# Patient Record
Sex: FEMALE | Race: WHITE | Employment: OTHER | URBAN - NONMETROPOLITAN AREA
[De-identification: names, ages, dates, MRNs, and addresses within clinical notes are randomized per-mention and may not be internally consistent; named-entity substitution may affect disease eponyms.]

---

## 2017-01-18 RX ORDER — PAROXETINE HYDROCHLORIDE 40 MG/1
40 TABLET, FILM COATED ORAL EVERY MORNING
Qty: 30 TABLET | Refills: 3 | Status: SHIPPED | OUTPATIENT
Start: 2017-01-18 | End: 2017-06-14 | Stop reason: SDUPTHER

## 2017-01-18 NOTE — TELEPHONE ENCOUNTER
----- Message from Carla Valderrama sent at 1/18/2017 11:22 AM CST -----  CALL IN PAXIL 40 MG TO [facility]

## 2017-01-19 ENCOUNTER — ANESTHESIA EVENT (OUTPATIENT)
Dept: OPERATING ROOM | Age: 67
End: 2017-01-19

## 2017-01-23 ENCOUNTER — HOSPITAL ENCOUNTER (OUTPATIENT)
Age: 67
Setting detail: OUTPATIENT SURGERY
Discharge: HOME | End: 2017-01-23
Attending: INTERNAL MEDICINE | Admitting: INTERNAL MEDICINE

## 2017-01-23 ENCOUNTER — ANESTHESIA (OUTPATIENT)
Dept: OPERATING ROOM | Age: 67
End: 2017-01-23
Payer: MEDICARE

## 2017-01-23 VITALS
DIASTOLIC BLOOD PRESSURE: 80 MMHG | RESPIRATION RATE: 18 BRPM | HEIGHT: 63 IN | BODY MASS INDEX: 46.07 KG/M2 | WEIGHT: 260 LBS | OXYGEN SATURATION: 100 % | TEMPERATURE: 97.4 F | HEART RATE: 48 BPM | SYSTOLIC BLOOD PRESSURE: 168 MMHG

## 2017-01-23 VITALS — DIASTOLIC BLOOD PRESSURE: 66 MMHG | OXYGEN SATURATION: 99 % | SYSTOLIC BLOOD PRESSURE: 135 MMHG

## 2017-01-23 PROCEDURE — 38221 DX BONE MARROW BIOPSIES: CPT

## 2017-01-23 PROCEDURE — G8907 PT DOC NO EVENTS ON DISCHARG: HCPCS

## 2017-01-23 PROCEDURE — 01112 ANES BONE MARROW ASPIR&/BX: CPT | Performed by: NURSE ANESTHETIST, CERTIFIED REGISTERED

## 2017-01-23 PROCEDURE — G8918 PT W/O PREOP ORDER IV AB PRO: HCPCS

## 2017-01-23 RX ORDER — HYDROMORPHONE HCL 110MG/55ML
0.25 PATIENT CONTROLLED ANALGESIA SYRINGE INTRAVENOUS EVERY 5 MIN PRN
Status: DISCONTINUED | OUTPATIENT
Start: 2017-01-23 | End: 2017-01-23 | Stop reason: HOSPADM

## 2017-01-23 RX ORDER — FENTANYL CITRATE 50 UG/ML
50 INJECTION, SOLUTION INTRAMUSCULAR; INTRAVENOUS EVERY 5 MIN PRN
Status: DISCONTINUED | OUTPATIENT
Start: 2017-01-23 | End: 2017-01-23 | Stop reason: HOSPADM

## 2017-01-23 RX ORDER — HYDROMORPHONE HCL 110MG/55ML
0.5 PATIENT CONTROLLED ANALGESIA SYRINGE INTRAVENOUS EVERY 5 MIN PRN
Status: DISCONTINUED | OUTPATIENT
Start: 2017-01-23 | End: 2017-01-23 | Stop reason: HOSPADM

## 2017-01-23 RX ORDER — FENTANYL CITRATE 50 UG/ML
INJECTION, SOLUTION INTRAMUSCULAR; INTRAVENOUS PRN
Status: DISCONTINUED | OUTPATIENT
Start: 2017-01-23 | End: 2017-01-23 | Stop reason: SDUPTHER

## 2017-01-23 RX ORDER — HYDRALAZINE HYDROCHLORIDE 20 MG/ML
5 INJECTION INTRAMUSCULAR; INTRAVENOUS EVERY 10 MIN PRN
Status: DISCONTINUED | OUTPATIENT
Start: 2017-01-23 | End: 2017-01-23 | Stop reason: HOSPADM

## 2017-01-23 RX ORDER — LISINOPRIL 20 MG/1
20 TABLET ORAL DAILY
COMMUNITY

## 2017-01-23 RX ORDER — MIDAZOLAM HYDROCHLORIDE 1 MG/ML
INJECTION INTRAMUSCULAR; INTRAVENOUS PRN
Status: DISCONTINUED | OUTPATIENT
Start: 2017-01-23 | End: 2017-01-23 | Stop reason: SDUPTHER

## 2017-01-23 RX ORDER — OXYCODONE HYDROCHLORIDE AND ACETAMINOPHEN 5; 325 MG/1; MG/1
1 TABLET ORAL PRN
Status: DISCONTINUED | OUTPATIENT
Start: 2017-01-23 | End: 2017-01-23 | Stop reason: HOSPADM

## 2017-01-23 RX ORDER — OXYBUTYNIN CHLORIDE 5 MG/1
5 TABLET ORAL DAILY
COMMUNITY

## 2017-01-23 RX ORDER — LABETALOL HYDROCHLORIDE 5 MG/ML
5 INJECTION, SOLUTION INTRAVENOUS EVERY 10 MIN PRN
Status: DISCONTINUED | OUTPATIENT
Start: 2017-01-23 | End: 2017-01-23 | Stop reason: HOSPADM

## 2017-01-23 RX ORDER — CETIRIZINE HYDROCHLORIDE 10 MG/1
10 TABLET ORAL DAILY
COMMUNITY
End: 2020-02-28

## 2017-01-23 RX ORDER — ONDANSETRON 2 MG/ML
4 INJECTION INTRAMUSCULAR; INTRAVENOUS
Status: DISCONTINUED | OUTPATIENT
Start: 2017-01-23 | End: 2017-01-23 | Stop reason: HOSPADM

## 2017-01-23 RX ORDER — DIPHENHYDRAMINE HYDROCHLORIDE 50 MG/ML
12.5 INJECTION INTRAMUSCULAR; INTRAVENOUS
Status: DISCONTINUED | OUTPATIENT
Start: 2017-01-23 | End: 2017-01-23 | Stop reason: HOSPADM

## 2017-01-23 RX ORDER — PROPOFOL 10 MG/ML
INJECTION, EMULSION INTRAVENOUS PRN
Status: DISCONTINUED | OUTPATIENT
Start: 2017-01-23 | End: 2017-01-23 | Stop reason: SDUPTHER

## 2017-01-23 RX ORDER — PROMETHAZINE HYDROCHLORIDE 25 MG/ML
12.5 INJECTION, SOLUTION INTRAMUSCULAR; INTRAVENOUS
Status: DISCONTINUED | OUTPATIENT
Start: 2017-01-23 | End: 2017-01-23 | Stop reason: HOSPADM

## 2017-01-23 RX ORDER — MEPERIDINE HYDROCHLORIDE 25 MG/ML
12.5 INJECTION INTRAMUSCULAR; INTRAVENOUS; SUBCUTANEOUS EVERY 5 MIN PRN
Status: DISCONTINUED | OUTPATIENT
Start: 2017-01-23 | End: 2017-01-23 | Stop reason: HOSPADM

## 2017-01-23 RX ORDER — M-VIT,TX,IRON,MINS/CALC/FOLIC 27MG-0.4MG
1 TABLET ORAL DAILY
COMMUNITY

## 2017-01-23 RX ORDER — ALLOPURINOL 300 MG/1
300 TABLET ORAL DAILY
COMMUNITY
End: 2020-02-28

## 2017-01-23 RX ORDER — PAROXETINE HYDROCHLORIDE 40 MG/1
40 TABLET, FILM COATED ORAL EVERY MORNING
COMMUNITY
End: 2020-02-28

## 2017-01-23 RX ORDER — SODIUM CHLORIDE, SODIUM LACTATE, POTASSIUM CHLORIDE, CALCIUM CHLORIDE 600; 310; 30; 20 MG/100ML; MG/100ML; MG/100ML; MG/100ML
INJECTION, SOLUTION INTRAVENOUS CONTINUOUS
Status: DISCONTINUED | OUTPATIENT
Start: 2017-01-23 | End: 2017-01-23 | Stop reason: HOSPADM

## 2017-01-23 RX ORDER — OXYCODONE HYDROCHLORIDE AND ACETAMINOPHEN 5; 325 MG/1; MG/1
2 TABLET ORAL PRN
Status: DISCONTINUED | OUTPATIENT
Start: 2017-01-23 | End: 2017-01-23 | Stop reason: HOSPADM

## 2017-01-23 RX ADMIN — SODIUM CHLORIDE, SODIUM LACTATE, POTASSIUM CHLORIDE, CALCIUM CHLORIDE: 600; 310; 30; 20 INJECTION, SOLUTION INTRAVENOUS at 12:40

## 2017-01-23 RX ADMIN — FENTANYL CITRATE 50 MCG: 50 INJECTION, SOLUTION INTRAMUSCULAR; INTRAVENOUS at 13:15

## 2017-01-23 RX ADMIN — MIDAZOLAM HYDROCHLORIDE 1 MG: 1 INJECTION INTRAMUSCULAR; INTRAVENOUS at 13:15

## 2017-01-23 RX ADMIN — PROPOFOL 100 MG: 10 INJECTION, EMULSION INTRAVENOUS at 13:15

## 2017-01-27 ENCOUNTER — OFFICE VISIT (OUTPATIENT)
Dept: INTERNAL MEDICINE | Facility: CLINIC | Age: 67
End: 2017-01-27

## 2017-01-27 VITALS
DIASTOLIC BLOOD PRESSURE: 70 MMHG | HEART RATE: 56 BPM | HEIGHT: 63 IN | BODY MASS INDEX: 46.23 KG/M2 | SYSTOLIC BLOOD PRESSURE: 146 MMHG | WEIGHT: 260.9 LBS

## 2017-01-27 DIAGNOSIS — N39.41 URGE INCONTINENCE: ICD-10-CM

## 2017-01-27 DIAGNOSIS — N32.81 OVERACTIVE BLADDER: ICD-10-CM

## 2017-01-27 DIAGNOSIS — D47.2 MONOCLONAL GAMMOPATHY: ICD-10-CM

## 2017-01-27 DIAGNOSIS — E11.9 TYPE 2 DIABETES MELLITUS WITHOUT COMPLICATION, WITHOUT LONG-TERM CURRENT USE OF INSULIN (HCC): ICD-10-CM

## 2017-01-27 DIAGNOSIS — F33.42 RECURRENT MAJOR DEPRESSIVE DISORDER, IN FULL REMISSION (HCC): ICD-10-CM

## 2017-01-27 DIAGNOSIS — N18.30 CKD (CHRONIC KIDNEY DISEASE) STAGE 3, GFR 30-59 ML/MIN (HCC): ICD-10-CM

## 2017-01-27 DIAGNOSIS — I10 ESSENTIAL HYPERTENSION: Primary | ICD-10-CM

## 2017-01-27 DIAGNOSIS — E66.01 MORBID OBESITY DUE TO EXCESS CALORIES (HCC): ICD-10-CM

## 2017-01-27 PROCEDURE — 99214 OFFICE O/P EST MOD 30 MIN: CPT | Performed by: INTERNAL MEDICINE

## 2017-01-27 RX ORDER — LISINOPRIL 20 MG/1
20 TABLET ORAL DAILY
Qty: 90 TABLET | Refills: 1 | Status: SHIPPED | OUTPATIENT
Start: 2017-01-27 | End: 2017-07-27 | Stop reason: CLARIF

## 2017-01-27 RX ORDER — OXYBUTYNIN CHLORIDE 10 MG/1
10 TABLET, EXTENDED RELEASE ORAL DAILY
Qty: 30 TABLET | Refills: 5 | Status: SHIPPED | OUTPATIENT
Start: 2017-01-27 | End: 2017-07-27 | Stop reason: SDUPTHER

## 2017-01-27 NOTE — PROGRESS NOTES
"CC: Follow-up for overactive bladder, diabetes, hypertension    History:  Sarah Littlejohn is a 66 y.o. female who presents today for follow-up for evaluation of the above:  She reports she has been doing very well and continues her medication for diabetes, hypertension, and depression.  She tried to wean off her Paxil, though this was unsuccessful and she began taking it regularly again.  She does have issues with both stress and urge incontinence.  Oxybutynin helped, though she is having some breakthrough symptoms overnight's.  She requests an increase in her dose.    ROS:  Review of Systems   Constitutional: Negative for chills and fever.   HENT: Negative for congestion and sore throat.    Respiratory: Negative for cough and shortness of breath.    Cardiovascular: Negative for chest pain and palpitations.   Gastrointestinal: Negative for abdominal pain, constipation and nausea.   Genitourinary: Positive for urgency.   Musculoskeletal: Negative for back pain and gait problem.       Ms. Littlejohn  reports that she has never smoked. She does not have any smokeless tobacco history on file. She reports that she does not drink alcohol.      Current Outpatient Prescriptions:   •  allopurinol (ZYLOPRIM) 300 MG tablet, Take 300 mg by mouth Daily., Disp: , Rfl:   •  cetirizine (ZyrTEC) 10 MG tablet, Take 10 mg by mouth Daily., Disp: , Rfl:   •  lisinopril (PRINIVIL,ZESTRIL) 20 MG tablet, Take 1 tablet by mouth Daily., Disp: 30 tablet, Rfl: 3  •  metFORMIN (GLUCOPHAGE) 500 MG tablet, Take 1 tablet by mouth Every Night., Disp: 30 tablet, Rfl: 3  •  oxybutynin XL (DITROPAN-XL) 10 MG 24 hr tablet, Take 1 tablet by mouth Daily., Disp: 30 tablet, Rfl: 5  •  PARoxetine (PAXIL) 40 MG tablet, Take 1 tablet by mouth Every Morning., Disp: 30 tablet, Rfl: 3  •  desonide (DESOWEN) 0.05 % cream, Apply 1 application topically As Needed for irritation., Disp: , Rfl:       OBJECTIVE:  Visit Vitals   • /70   • Pulse 56   • Ht 63\" " (160 cm)   • Wt 260 lb 14.4 oz (118 kg)   • BMI 46.22 kg/m2      Physical Exam   Constitutional: She is oriented to person, place, and time. She appears well-nourished. No distress.   Cardiovascular: Normal rate and regular rhythm.  Exam reveals gallop and S4.    No murmur heard.  Pulmonary/Chest: Effort normal and breath sounds normal. She has no wheezes.   Abdominal: Soft. There is no tenderness.   Neurological: She is alert and oriented to person, place, and time. Gait normal.   Psychiatric: She has a normal mood and affect.       Assessment/Plan    Diagnoses and all orders for this visit:    Essential hypertension  -     Lipid Panel; Future  -     lisinopril (PRINIVIL,ZESTRIL) 20 MG tablet; Take 1 tablet by mouth Daily.  Well controlled, BP goal for age is <140/90 per JNC 8 guidelines and continue current medications    Overactive bladder  Urge incontinence  -     oxybutynin XL (DITROPAN-XL) 10 MG 24 hr tablet; Take 1 tablet by mouth Daily.    CKD (chronic kidney disease) stage 3, GFR 30-59 ml/min  -     Comprehensive Metabolic Panel; Future  Stable on lisinopril.  She follows with Dr. Santiago.    Morbid obesity due to excess calories  -     Lipid Panel; Future  She is down 2 pounds since her appointment 3 months ago.  We encouraged her to continue with dietary modification and regular exercise.    Recurrent major depressive disorder, in full remission  Unsuccessful wean off Paxil.  She will continue this for now and we may try another drug holiday in 6-12 months.    Type 2 diabetes mellitus without complication, without long-term current use of insulin  -     Hemoglobin A1c; Future  -     Hepatitis C Antibody; Future  -     metFORMIN (GLUCOPHAGE) 500 MG tablet; Take 1 tablet by mouth Every Night.  A1c was 6.0% in July 2016.  This represents excellent control and given the option to stop metformin, she opted to continue until a follow-up A1c.  She continues on ACE inhibitor therapy without significant  microalbuminuria.  We will check lipid panel to determine 10 year ASCVD risk to guide the need for pharmacologic therapy, though given age, it may not be needed to start therapy at this time.    Monoclonal gammopathy  Currently under the care of Dr. Zeng and has had workup including bone marrow biopsy.  She goes back in a couple of weeks for results of that, though they seem hopeful to have a diagnosis of MGUS, which will only require routine follow-up.    She received Prevnar vaccination at her previous visit and we will plan for Pneumovax 1 year after that date.      An After Visit Summary was printed and given to the patient at discharge.  Return in about 6 months (around 7/27/2017) for Medicare Wellness. Sooner if problems arise.         Michael Singleton D.O. 1/27/2017

## 2017-01-27 NOTE — MR AVS SNAPSHOT
Sarah Littlejohn   1/27/2017 1:30 PM   Office Visit    Dept Phone:  [phone]   Encounter #:  [id number]    Provider:  Michael Singleton DO   Department:  [facility]                Your Full Care Plan              Today's Medication Changes          These changes are accurate as of: 1/27/17  2:01 PM.  If you have any questions, ask your nurse or doctor.               Medication(s)that have changed:     oxybutynin XL 10 MG 24 hr tablet   Commonly known as:  DITROPAN-XL   Take 1 tablet by mouth Daily.   What changed:    - medication strength  - how much to take   Changed by:  Michael Singleton DO            Where to Get Your Medications      These medications were sent to [id number] - [place], [place] - [address] - [phone]  - [phone]   [address] [place] [address]     Phone:  [phone]     lisinopril 20 MG tablet    metFORMIN 500 MG tablet    oxybutynin XL 10 MG 24 hr tablet                  Your Updated Medication List          This list is accurate as of: 1/27/17  2:01 PM.  Always use your most recent med list.                allopurinol 300 MG tablet   Commonly known as:  ZYLOPRIM       cetirizine 10 MG tablet   Commonly known as:  zyrTEC       desonide 0.05 % cream   Commonly known as:  DESOWEN       lisinopril 20 MG tablet   Commonly known as:  PRINIVIL,ZESTRIL   Take 1 tablet by mouth Daily.       metFORMIN 500 MG tablet   Commonly known as:  GLUCOPHAGE   Take 1 tablet by mouth Every Night.       oxybutynin XL 10 MG 24 hr tablet   Commonly known as:  DITROPAN-XL   Take 1 tablet by mouth Daily.       PARoxetine 40 MG tablet   Commonly known as:  PAXIL   Take 1 tablet by mouth Every Morning.               You Were Diagnosed With        Codes Comments    Essential hypertension    -  Primary ICD-10-CM: I10  ICD-9-CM: 401.9     Overactive bladder     ICD-10-CM: N32.81  ICD-9-CM: 596.51     Urge  incontinence     ICD-10-CM: N39.41  ICD-9-CM: 788.31     CKD (chronic kidney disease) stage 3, GFR 30-59 ml/min     ICD-10-CM: N18.3  ICD-9-CM: 585.3     Morbid obesity due to excess calories     ICD-10-CM: E66.01  ICD-9-CM: 278.01     Recurrent major depressive disorder, in full remission     ICD-10-CM: F33.42  ICD-9-CM: 296.36     Type 2 diabetes mellitus without complication, without long-term current use of insulin     ICD-10-CM: E11.9  ICD-9-CM: 250.00       Instructions     None    Patient Instructions History      Upcoming Appointments     Visit Type Date Time Department    FOLLOW UP 2017  1:30 PM [facility] [place]    INIT MEDICARE WELLNESS VISIT 2017 11:00 AM [facility] [place]      Back& Signup     [facility] Back& allows you to send messages to your doctor, view your test results, renew your prescriptions, schedule appointments, and more. To sign up, go to POS on CLOUD and click on the Sign Up Now link in the New User? box. Enter your Back& Activation Code exactly as it appears below along with the last four digits of your Social Security Number and your Date of Birth () to complete the sign-up process. If you do not sign up before the expiration date, you must request a new code.    Back& Activation Code: [email or web address]  Expires: 2/10/2017  2:01 PM    If you have questions, you can email [email or web address] or call [phone] to talk to our Back& staff. Remember, Back& is NOT to be used for urgent needs. For medical emergencies, dial 911.               Other Info from Your Visit           Your Appointments     2017 11:00 AM CDT   Initial Medicare Wellness Visit with Michael Singleton DO   [facility] (--)    [address] [place] [address]  [facility] [address] [phone]              Allergies     Neosporin [Neomycin-bacitracin Zn-polymyx]      Penicillins        Vital Signs     Blood Pressure Pulse Height Weight  "Body Mass Index Smoking Status    146/70 56 63\" (160 cm) 260 lb 14.4 oz (118 kg) 46.22 kg/m2 Never Smoker      Problems and Diagnoses Noted     CKD (chronic kidney disease) stage 3, GFR 30-59 ml/min    Depression    High blood pressure    Severe obesity    Overactive bladder        Urge incontinence of urine        Type 2 diabetes mellitus without complication, without long-term current use of insulin            "

## 2017-02-09 ENCOUNTER — HOSPITAL ENCOUNTER (OUTPATIENT)
Dept: CT IMAGING | Age: 67
Discharge: HOME | End: 2017-02-09
Payer: MEDICARE

## 2017-02-09 DIAGNOSIS — D47.2 MONOCLONAL GAMMOPATHY: ICD-10-CM

## 2017-02-09 PROCEDURE — 74176 CT ABD & PELVIS W/O CONTRAST: CPT

## 2017-02-09 PROCEDURE — 70490 CT SOFT TISSUE NECK W/O DYE: CPT

## 2017-02-09 PROCEDURE — 71250 CT THORAX DX C-: CPT

## 2017-02-16 ENCOUNTER — HOSPITAL ENCOUNTER (OUTPATIENT)
Dept: ULTRASOUND IMAGING | Age: 67
Discharge: HOME | End: 2017-02-16
Payer: MEDICARE

## 2017-02-16 DIAGNOSIS — N28.1 KIDNEY CYST, ACQUIRED: ICD-10-CM

## 2017-02-16 PROCEDURE — 76770 US EXAM ABDO BACK WALL COMP: CPT

## 2017-05-08 ENCOUNTER — TRANSCRIBE ORDERS (OUTPATIENT)
Dept: GENERAL RADIOLOGY | Facility: HOSPITAL | Age: 67
End: 2017-05-08

## 2017-05-08 ENCOUNTER — LAB (OUTPATIENT)
Dept: LAB | Facility: HOSPITAL | Age: 67
End: 2017-05-08
Attending: INTERNAL MEDICINE

## 2017-05-08 DIAGNOSIS — N18.30 CHRONIC KIDNEY DISEASE, STAGE III (MODERATE) (HCC): ICD-10-CM

## 2017-05-08 DIAGNOSIS — N18.30 CHRONIC KIDNEY DISEASE, STAGE III (MODERATE) (HCC): Primary | ICD-10-CM

## 2017-05-08 LAB
ALBUMIN SERPL-MCNC: 4.1 G/DL (ref 3.5–5)
ANION GAP SERPL CALCULATED.3IONS-SCNC: 11 MMOL/L (ref 4–13)
AUTO MIXED CELLS #: 0.7 10*3/UL (ref 0.1–2.6)
AUTO MIXED CELLS %: 12.3 % (ref 0.1–24)
BACTERIA UR QL AUTO: ABNORMAL /HPF
BILIRUB UR QL STRIP: NEGATIVE
BUN BLD-MCNC: 23 MG/DL (ref 5–21)
BUN/CREAT SERPL: 17.6
CALCIUM SPEC-SCNC: 9.2 MG/DL (ref 8.4–10.4)
CHLORIDE SERPL-SCNC: 102 MMOL/L (ref 98–110)
CLARITY UR: CLEAR
CO2 SERPL-SCNC: 29 MMOL/L (ref 24–31)
COLOR UR: YELLOW
CREAT BLD-MCNC: 1.31 MG/DL (ref 0.5–1.4)
CREAT UR-MCNC: 84.4 MG/DL
ERYTHROCYTE [DISTWIDTH] IN BLOOD BY AUTOMATED COUNT: 11.9 % (ref 12–15)
GFR SERPL CREATININE-BSD FRML MDRD: 41 ML/MIN/1.73
GLUCOSE BLD-MCNC: 108 MG/DL (ref 70–100)
GLUCOSE UR STRIP-MCNC: NEGATIVE MG/DL
HCT VFR BLD AUTO: 39.3 % (ref 37–47)
HGB BLD-MCNC: 13.5 G/DL (ref 12–16)
HGB UR QL STRIP.AUTO: NEGATIVE
HYALINE CASTS UR QL AUTO: ABNORMAL /LPF
KETONES UR QL STRIP: NEGATIVE
LEUKOCYTE ESTERASE UR QL STRIP.AUTO: ABNORMAL
LYMPHOCYTES # BLD AUTO: 1.5 10*3/MM3 (ref 0.8–7)
LYMPHOCYTES NFR BLD AUTO: 25.5 % (ref 15–45)
MCH RBC QN AUTO: 31.7 PG (ref 28–32)
MCHC RBC AUTO-ENTMCNC: 34.4 G/DL (ref 33–36)
MCV RBC AUTO: 92.3 FL (ref 82–98)
NEUTROPHILS # BLD AUTO: 3.7 10*3/MM3 (ref 1.5–8.3)
NEUTROPHILS NFR BLD AUTO: 62.2 % (ref 39–78)
NITRITE UR QL STRIP: NEGATIVE
PH UR STRIP.AUTO: 6.5 [PH] (ref 5–8)
PHOSPHATE SERPL-MCNC: 3.9 MG/DL (ref 2.5–4.5)
PLATELET # BLD AUTO: 213 10*3/MM3 (ref 130–400)
PMV BLD AUTO: 9.1 FL (ref 6–12)
POTASSIUM BLD-SCNC: 4.8 MMOL/L (ref 3.5–5.3)
PROT UR QL STRIP: NEGATIVE
PROT UR-MCNC: 7 MG/DL (ref 0–13.5)
PROT/CREAT UR: 82.9 MG/G CREA
PTH-INTACT SERPL-MCNC: 34.9 PG/ML (ref 7.5–53.5)
RBC # BLD AUTO: 4.26 10*6/MM3 (ref 4.2–5.4)
RBC # UR: ABNORMAL /HPF
REF LAB TEST METHOD: ABNORMAL
SODIUM BLD-SCNC: 142 MMOL/L (ref 135–145)
SP GR UR STRIP: 1.01 (ref 1–1.03)
SQUAMOUS #/AREA URNS HPF: ABNORMAL /HPF
URATE SERPL-MCNC: 7.8 MG/DL (ref 2.7–7.5)
UROBILINOGEN UR QL STRIP: ABNORMAL
WBC NRBC COR # BLD: 5.9 10*3/MM3 (ref 4.8–10.8)
WBC UR QL AUTO: ABNORMAL /HPF

## 2017-05-08 PROCEDURE — 82570 ASSAY OF URINE CREATININE: CPT | Performed by: INTERNAL MEDICINE

## 2017-05-08 PROCEDURE — 84550 ASSAY OF BLOOD/URIC ACID: CPT

## 2017-05-08 PROCEDURE — 81001 URINALYSIS AUTO W/SCOPE: CPT

## 2017-05-08 PROCEDURE — 83970 ASSAY OF PARATHORMONE: CPT | Performed by: INTERNAL MEDICINE

## 2017-05-08 PROCEDURE — 84156 ASSAY OF PROTEIN URINE: CPT | Performed by: INTERNAL MEDICINE

## 2017-05-08 PROCEDURE — 85025 COMPLETE CBC W/AUTO DIFF WBC: CPT | Performed by: INTERNAL MEDICINE

## 2017-05-08 PROCEDURE — 80069 RENAL FUNCTION PANEL: CPT

## 2017-05-08 PROCEDURE — 36415 COLL VENOUS BLD VENIPUNCTURE: CPT

## 2017-05-08 PROCEDURE — 87086 URINE CULTURE/COLONY COUNT: CPT | Performed by: INTERNAL MEDICINE

## 2017-05-10 LAB — BACTERIA SPEC AEROBE CULT: ABNORMAL

## 2017-05-25 ENCOUNTER — HOSPITAL ENCOUNTER (OUTPATIENT)
Dept: CT IMAGING | Age: 67
Discharge: HOME | End: 2017-05-25
Payer: MEDICARE

## 2017-05-25 DIAGNOSIS — R91.1 PULMONARY NODULE: ICD-10-CM

## 2017-05-25 PROCEDURE — 71250 CT THORAX DX C-: CPT

## 2017-06-14 RX ORDER — PAROXETINE HYDROCHLORIDE 40 MG/1
40 TABLET, FILM COATED ORAL EVERY MORNING
Qty: 30 TABLET | Refills: 5 | Status: SHIPPED | OUTPATIENT
Start: 2017-06-14 | End: 2018-01-02 | Stop reason: SDUPTHER

## 2017-07-27 ENCOUNTER — OFFICE VISIT (OUTPATIENT)
Dept: INTERNAL MEDICINE | Facility: CLINIC | Age: 67
End: 2017-07-27

## 2017-07-27 VITALS
HEART RATE: 64 BPM | WEIGHT: 253.7 LBS | BODY MASS INDEX: 44.95 KG/M2 | DIASTOLIC BLOOD PRESSURE: 58 MMHG | SYSTOLIC BLOOD PRESSURE: 110 MMHG | OXYGEN SATURATION: 98 % | RESPIRATION RATE: 16 BRPM | HEIGHT: 63 IN

## 2017-07-27 DIAGNOSIS — N18.30 CKD (CHRONIC KIDNEY DISEASE) STAGE 3, GFR 30-59 ML/MIN (HCC): ICD-10-CM

## 2017-07-27 DIAGNOSIS — R73.02 IMPAIRED GLUCOSE TOLERANCE: ICD-10-CM

## 2017-07-27 DIAGNOSIS — I10 ESSENTIAL HYPERTENSION: Primary | ICD-10-CM

## 2017-07-27 DIAGNOSIS — N32.81 OVERACTIVE BLADDER: ICD-10-CM

## 2017-07-27 DIAGNOSIS — E66.01 MORBID OBESITY DUE TO EXCESS CALORIES (HCC): ICD-10-CM

## 2017-07-27 DIAGNOSIS — F33.41 RECURRENT MAJOR DEPRESSIVE DISORDER, IN PARTIAL REMISSION (HCC): ICD-10-CM

## 2017-07-27 DIAGNOSIS — N39.41 URGE INCONTINENCE: ICD-10-CM

## 2017-07-27 PROCEDURE — 99214 OFFICE O/P EST MOD 30 MIN: CPT | Performed by: INTERNAL MEDICINE

## 2017-07-27 PROCEDURE — G0438 PPPS, INITIAL VISIT: HCPCS | Performed by: INTERNAL MEDICINE

## 2017-07-27 RX ORDER — OXYBUTYNIN CHLORIDE 10 MG/1
10 TABLET, EXTENDED RELEASE ORAL DAILY
Qty: 30 TABLET | Refills: 5 | Status: SHIPPED | OUTPATIENT
Start: 2017-07-27 | End: 2018-02-22 | Stop reason: SDUPTHER

## 2017-07-27 RX ORDER — LISINOPRIL AND HYDROCHLOROTHIAZIDE 20; 12.5 MG/1; MG/1
1 TABLET ORAL DAILY
COMMUNITY
Start: 2017-05-12 | End: 2020-11-02

## 2017-07-27 NOTE — PROGRESS NOTES
"CC: Follow-up impaired glucose tolerance    History:  Sarah Littlejohn is a 67 y.o. female who presents today for follow-up for evaluation of the above:  She reports she has been doing well.  She continues on metformin and tolerates this well.  She also continues on Zestoretic related to blood pressure and denies any symptoms of headache or vision disturbance.  She notes no changes in her urinary habits.  Her depression is well controlled with Paxil and she does continue on oxybutynin related to urge incontinence and overactive bladder.    ROS:  Review of Systems   Constitutional: Negative for chills and fever.   Respiratory: Negative for cough and shortness of breath.    Cardiovascular: Negative for chest pain and palpitations.   Gastrointestinal: Negative for abdominal pain, constipation and nausea.   Genitourinary: Positive for urgency.       Ms. Littlejohn  reports that she has never smoked. She has never used smokeless tobacco. She reports that she does not drink alcohol or use illicit drugs.      Current Outpatient Prescriptions:   •  desonide (DESOWEN) 0.05 % cream, Apply 1 application topically As Needed for irritation., Disp: , Rfl:   •  lisinopril-hydrochlorothiazide (PRINZIDE,ZESTORETIC) 20-12.5 MG per tablet, Take 1 tablet by mouth 2 (Two) Times a Day., Disp: , Rfl:   •  metFORMIN (GLUCOPHAGE) 500 MG tablet, Take 1 tablet by mouth Every Night., Disp: 90 tablet, Rfl: 1  •  oxybutynin XL (DITROPAN-XL) 10 MG 24 hr tablet, Take 1 tablet by mouth Daily., Disp: 30 tablet, Rfl: 5  •  PARoxetine (PAXIL) 40 MG tablet, Take 1 tablet by mouth Every Morning., Disp: 30 tablet, Rfl: 5  •  cetirizine (ZyrTEC) 10 MG tablet, Take 10 mg by mouth Daily., Disp: , Rfl:       OBJECTIVE:  /58 (BP Location: Left arm, Patient Position: Sitting, Cuff Size: Adult)  Pulse 64  Resp 16  Ht 63\" (160 cm)  Wt 253 lb 11.2 oz (115 kg)  SpO2 98%  BMI 44.94 kg/m2   Physical Exam   Constitutional: She is oriented to person, " place, and time. She appears well-nourished. No distress.   Cardiovascular: Normal rate, regular rhythm and normal heart sounds.    No murmur heard.  Pulmonary/Chest: Effort normal and breath sounds normal. She has no wheezes.   Abdominal: Soft. There is no tenderness.   Neurological: She is alert and oriented to person, place, and time.   Psychiatric: She has a normal mood and affect.       Assessment/Plan    Diagnoses and all orders for this visit:    Essential hypertension  Well controlled, BP goal for age is <140/90 per JNC 8 guidelines and continue current medications    Overactive bladder  Urge incontinence  -     oxybutynin XL (DITROPAN-XL) 10 MG 24 hr tablet; Take 1 tablet by mouth Daily.    CKD (chronic kidney disease) stage 3, GFR 30-59 ml/min  Stable on lisinopril.  She follows with Dr. Santiago.    Impaired glucose tolerance  Last A1c in July 2016 was 6.0%.  Glucose was 108 on recent renal function panel for Dr. Santiago.  This likely indicates good control and we will continue on metformin at this time.    Morbid obesity due to excess calories  Recommended attention to portion control and being careful about the types and timing of meals for the purpose of weight management.    Recurrent major depressive disorder, in partial remission  Stable on Paxil without any uncontrolled symptoms.      An After Visit Summary was printed and given to the patient at discharge.  Return in about 6 months (around 1/27/2018) for Recheck. Sooner if problems arise.         Michael Singleton D.O. 7/27/2017

## 2017-07-27 NOTE — PROGRESS NOTES
QUICK REFERENCE INFORMATION:  The ABCs of the Annual Wellness Visit    Initial Medicare Wellness Visit    HEALTH RISK ASSESSMENT    1950    Recent Hospitalizations:  No hospitalization(s) within the last year..        Current Medical Providers:  Patient Care Team:  Michael Singleton DO as PCP - General (Internal Medicine)  ANY Swift as PCP - Claims Attributed  Dewayne Santiago MD as Consulting Physician (Nephrology)  Jose Zeng MD as Consulting Physician (Oncology)  Marek Wayne MD as Consulting Physician (Dermatology)  Hannah Marvin MD as Consulting Physician (Ophthalmology)        Smoking Status:  History   Smoking Status   • Never Smoker   Smokeless Tobacco   • Never Used       Alcohol Consumption:  History   Alcohol Use No       Depression Screen:   PHQ-9 Depression Screening 7/27/2017   Little interest or pleasure in doing things 0   Feeling down, depressed, or hopeless 0   PHQ-9 Total Score 0       Health Habits and Functional and Cognitive Screening:  Functional & Cognitive Status 7/27/2017   Do you have difficulty preparing food and eating? No   Do you have difficulty bathing yourself? No   Do you have difficulty getting dressed? No   Do you have difficulty using the toilet? No   Do you have difficulty moving around from place to place? No   In the past year have you fallen or experienced a near fall? No   Do you need help using the phone?  No   Are you deaf or do you have serious difficulty hearing?  No   Do you need help with transportation? No   Do you need help shopping? No   Do you need help preparing meals?  No   Do you need help with housework?  No   Do you need help with laundry? No   Do you need help taking your medications? No   Do you need help managing money? No   Do you have difficulty concentrating, remembering or making decisions? No       Health Habits  Current Diet: Well Balanced Diet  Dental Exam: Not up to date  Eye Exam: Up to  date  Exercise (times per week): 2 times per week  Current Exercise Activities Include: Walking          Does the patient have evidence of cognitive impairment? No    Asiprin use counseling: Does not need ASA (and currently is not on it)      Recent Lab Results:    Visual Acuity:  No exam data present    Age-appropriate Screening Schedule:  Refer to the list below for future screening recommendations based on patient's age, sex and/or medical conditions. Orders for these recommended tests are listed in the plan section. The patient has been provided with a written plan.    Health Maintenance   Topic Date Due   • TDAP/TD VACCINES (1 - Tdap) 02/10/1970   • ZOSTER VACCINE  10/25/2016   • DIABETIC FOOT EXAM  01/27/2017   • HEMOGLOBIN A1C  01/29/2017   • INFLUENZA VACCINE  08/01/2017   • PNEUMOCOCCAL VACCINES (65+ LOW/MEDIUM RISK) (2 of 2 - PPSV23) 10/28/2017   • DIABETIC EYE EXAM  03/06/2018   • URINE MICROALBUMIN  05/08/2018   • MAMMOGRAM  02/06/2019   • COLONOSCOPY  06/08/2026        Subjective   History of Present Illness    Sarah Littlejohn is a 67 y.o. female who presents for an Annual Wellness Visit.    The following portions of the patient's history were reviewed and updated as appropriate: allergies, current medications, past family history, past medical history, past social history, past surgical history and problem list.    Outpatient Medications Prior to Visit   Medication Sig Dispense Refill   • desonide (DESOWEN) 0.05 % cream Apply 1 application topically As Needed for irritation.     • metFORMIN (GLUCOPHAGE) 500 MG tablet Take 1 tablet by mouth Every Night. 90 tablet 1   • PARoxetine (PAXIL) 40 MG tablet Take 1 tablet by mouth Every Morning. 30 tablet 5   • allopurinol (ZYLOPRIM) 300 MG tablet Take 300 mg by mouth Daily.     • oxybutynin XL (DITROPAN-XL) 10 MG 24 hr tablet Take 1 tablet by mouth Daily. 30 tablet 5   • cetirizine (ZyrTEC) 10 MG tablet Take 10 mg by mouth Daily.     • lisinopril  "(PRINIVIL,ZESTRIL) 20 MG tablet Take 1 tablet by mouth Daily. 90 tablet 1     No facility-administered medications prior to visit.        Patient Active Problem List   Diagnosis   • Essential hypertension   • Morbid obesity due to excess calories   • CKD (chronic kidney disease) stage 3, GFR 30-59 ml/min   • Depression   • MGUS (monoclonal gammopathy of unknown significance)   • Impaired glucose tolerance   • Urge incontinence       Advance Care Planning:  has an advance directive - a copy HAS NOT been provided. Have asked the patient to send this to us to add to record.    Identification of Risk Factors:  Risk factors include: weight , unhealthy diet, cardiovascular risk and inactivity.    Review of Systems See  note    Compared to one year ago, the patient feels her physical health is the same.  Compared to one year ago, the patient feels her mental health is the same.    Objective     Physical Exam See  note    Vitals:    07/27/17 1107   BP: 110/58   BP Location: Left arm   Patient Position: Sitting   Cuff Size: Adult   Pulse: 64   Resp: 16   SpO2: 98%   Weight: 253 lb 11.2 oz (115 kg)   Height: 63\" (160 cm)       Body mass index is 44.94 kg/(m^2).  Discussed the patient's BMI with her. The BMI is above average; BMI management plan is completed.    Assessment/Plan   Patient Self-Management and Personalized Health Advice  The patient has been provided with information about: diet, exercise, weight management and designing advance directives and preventive services including:   · Advance directive, Exercise counseling provided, Fall Risk assessment done, Influenza vaccine, Nutrition counseling provided, TdaP vaccine, Zostavax vaccine (Herpes Zoster).    Visit Diagnoses:    ICD-10-CM ICD-9-CM   1. Essential hypertension I10 401.9   2. Overactive bladder N32.81 596.51   3. Urge incontinence N39.41 788.31   4. CKD (chronic kidney disease) stage 3, GFR 30-59 ml/min N18.3 585.3   5. Impaired glucose " tolerance R73.02 790.22   6. Morbid obesity due to excess calories E66.01 278.01       No orders of the defined types were placed in this encounter.      Outpatient Encounter Prescriptions as of 7/27/2017   Medication Sig Dispense Refill   • desonide (DESOWEN) 0.05 % cream Apply 1 application topically As Needed for irritation.     • lisinopril-hydrochlorothiazide (PRINZIDE,ZESTORETIC) 20-12.5 MG per tablet Take 1 tablet by mouth 2 (Two) Times a Day.     • metFORMIN (GLUCOPHAGE) 500 MG tablet Take 1 tablet by mouth Every Night. 90 tablet 1   • oxybutynin XL (DITROPAN-XL) 10 MG 24 hr tablet Take 1 tablet by mouth Daily. 30 tablet 5   • PARoxetine (PAXIL) 40 MG tablet Take 1 tablet by mouth Every Morning. 30 tablet 5   • [DISCONTINUED] allopurinol (ZYLOPRIM) 300 MG tablet Take 300 mg by mouth Daily.     • [DISCONTINUED] oxybutynin XL (DITROPAN-XL) 10 MG 24 hr tablet Take 1 tablet by mouth Daily. 30 tablet 5   • cetirizine (ZyrTEC) 10 MG tablet Take 10 mg by mouth Daily.     • [DISCONTINUED] lisinopril (PRINIVIL,ZESTRIL) 20 MG tablet Take 1 tablet by mouth Daily. 90 tablet 1     No facility-administered encounter medications on file as of 7/27/2017.        Reviewed use of high risk medication in the elderly: yes  Reviewed for potential of harmful drug interactions in the elderly: yes    Follow Up:  Return in about 6 months (around 1/27/2018) for Recheck.     An After Visit Summary and PPPS with all of these plans were given to the patient.

## 2017-07-27 NOTE — PATIENT INSTRUCTIONS
Medicare Wellness  Personal Prevention Plan of Service     Date of Office Visit:  2017  Encounter Provider:  Michael Singleton DO  Place of Service:  [facility]  Patient Name: Sarah Littlejohn  :  1950    As part of the Medicare Wellness portion of your visit today, we are providing you with this personalized preventive plan of services (PPPS). This plan is based upon recommendations of the [place] Preventive Services Task Force (USPSTF) and the Advisory Committee on Immunization Practices (ACIP).    This lists the preventive care services that should be considered, and provides dates of when you are due. Items listed as completed are up-to-date and do not require any further intervention.    Health Maintenance   Topic Date Due   • TDAP/TD VACCINES (1 - Tdap) 02/10/1970   • HEPATITIS C SCREENING  10/25/2016   • MEDICARE ANNUAL WELLNESS  10/25/2016   • ZOSTER VACCINE  10/25/2016   • DIABETIC FOOT EXAM  2017   • HEMOGLOBIN A1C  2017   • INFLUENZA VACCINE  2017   • PNEUMOCOCCAL VACCINES (65+ LOW/MEDIUM RISK) (2 of 2 - PPSV23) 10/28/2017   • DIABETIC EYE EXAM  2018   • URINE MICROALBUMIN  2018   • MAMMOGRAM  2019   • COLONOSCOPY  2026       No orders of the defined types were placed in this encounter.      Return in about 6 months (around 2018) for Recheck.

## 2017-07-28 PROBLEM — E79.0 ASYMPTOMATIC HYPERURICEMIA: Status: ACTIVE | Noted: 2017-07-28

## 2017-09-05 DIAGNOSIS — E11.9 TYPE 2 DIABETES MELLITUS WITHOUT COMPLICATION, WITHOUT LONG-TERM CURRENT USE OF INSULIN (HCC): ICD-10-CM

## 2017-09-06 DIAGNOSIS — E11.9 TYPE 2 DIABETES MELLITUS WITHOUT COMPLICATION, WITHOUT LONG-TERM CURRENT USE OF INSULIN (HCC): ICD-10-CM

## 2017-10-26 ENCOUNTER — TRANSCRIBE ORDERS (OUTPATIENT)
Dept: GENERAL RADIOLOGY | Facility: HOSPITAL | Age: 67
End: 2017-10-26

## 2017-10-26 ENCOUNTER — HOSPITAL ENCOUNTER (OUTPATIENT)
Dept: GENERAL RADIOLOGY | Facility: HOSPITAL | Age: 67
Discharge: HOME | End: 2017-10-26
Admitting: NURSE PRACTITIONER

## 2017-10-26 DIAGNOSIS — S69.92XA INJURY OF LEFT LITTLE FINGER, INITIAL ENCOUNTER: Primary | ICD-10-CM

## 2017-10-26 PROCEDURE — 73140 X-RAY EXAM OF FINGER(S): CPT

## 2017-11-20 ENCOUNTER — TRANSCRIBE ORDERS (OUTPATIENT)
Dept: ADMINISTRATIVE | Facility: HOSPITAL | Age: 67
End: 2017-11-20

## 2017-11-20 DIAGNOSIS — Z12.31 ENCOUNTER FOR SCREENING MAMMOGRAM FOR MALIGNANT NEOPLASM OF BREAST: Primary | ICD-10-CM

## 2017-11-27 ENCOUNTER — HOSPITAL ENCOUNTER (OUTPATIENT)
Dept: MAMMOGRAPHY | Facility: HOSPITAL | Age: 67
Discharge: HOME | End: 2017-11-27
Attending: INTERNAL MEDICINE | Admitting: INTERNAL MEDICINE

## 2017-11-27 DIAGNOSIS — Z12.31 ENCOUNTER FOR SCREENING MAMMOGRAM FOR MALIGNANT NEOPLASM OF BREAST: ICD-10-CM

## 2017-11-27 PROCEDURE — G0202 SCR MAMMO BI INCL CAD: HCPCS

## 2017-11-27 PROCEDURE — 77063 BREAST TOMOSYNTHESIS BI: CPT

## 2017-12-04 ENCOUNTER — HOSPITAL ENCOUNTER (OUTPATIENT)
Dept: CT IMAGING | Age: 67
Discharge: HOME | End: 2017-12-04
Payer: MEDICARE

## 2017-12-04 DIAGNOSIS — R91.1 SOLITARY PULMONARY NODULE: ICD-10-CM

## 2017-12-04 PROCEDURE — 71250 CT THORAX DX C-: CPT

## 2017-12-30 RX ORDER — PAROXETINE HYDROCHLORIDE 40 MG/1
TABLET, FILM COATED ORAL
Qty: 30 TABLET | Refills: 5 | Status: CANCELLED | OUTPATIENT
Start: 2017-12-30

## 2018-01-02 RX ORDER — PAROXETINE HYDROCHLORIDE 40 MG/1
40 TABLET, FILM COATED ORAL EVERY MORNING
Qty: 30 TABLET | Refills: 5 | Status: SHIPPED | OUTPATIENT
Start: 2018-01-02 | End: 2018-07-18 | Stop reason: SDUPTHER

## 2018-01-29 ENCOUNTER — OFFICE VISIT (OUTPATIENT)
Dept: INTERNAL MEDICINE | Facility: CLINIC | Age: 68
End: 2018-01-29

## 2018-01-29 VITALS
DIASTOLIC BLOOD PRESSURE: 62 MMHG | RESPIRATION RATE: 16 BRPM | WEIGHT: 246.5 LBS | HEART RATE: 65 BPM | BODY MASS INDEX: 43.68 KG/M2 | SYSTOLIC BLOOD PRESSURE: 155 MMHG | HEIGHT: 63 IN | OXYGEN SATURATION: 98 %

## 2018-01-29 DIAGNOSIS — Z23 ENCOUNTER FOR IMMUNIZATION: ICD-10-CM

## 2018-01-29 DIAGNOSIS — E66.01 MORBID OBESITY DUE TO EXCESS CALORIES (HCC): ICD-10-CM

## 2018-01-29 DIAGNOSIS — I10 ESSENTIAL HYPERTENSION: Primary | ICD-10-CM

## 2018-01-29 DIAGNOSIS — F33.41 RECURRENT MAJOR DEPRESSIVE DISORDER, IN PARTIAL REMISSION (HCC): ICD-10-CM

## 2018-01-29 DIAGNOSIS — R73.02 IMPAIRED GLUCOSE TOLERANCE: ICD-10-CM

## 2018-01-29 DIAGNOSIS — N18.30 CKD (CHRONIC KIDNEY DISEASE) STAGE 3, GFR 30-59 ML/MIN (HCC): ICD-10-CM

## 2018-01-29 PROCEDURE — 96160 PT-FOCUSED HLTH RISK ASSMT: CPT | Performed by: INTERNAL MEDICINE

## 2018-01-29 PROCEDURE — 90732 PPSV23 VACC 2 YRS+ SUBQ/IM: CPT | Performed by: INTERNAL MEDICINE

## 2018-01-29 PROCEDURE — G0009 ADMIN PNEUMOCOCCAL VACCINE: HCPCS | Performed by: INTERNAL MEDICINE

## 2018-01-29 PROCEDURE — 90662 IIV NO PRSV INCREASED AG IM: CPT | Performed by: INTERNAL MEDICINE

## 2018-01-29 PROCEDURE — G0008 ADMIN INFLUENZA VIRUS VAC: HCPCS | Performed by: INTERNAL MEDICINE

## 2018-01-29 PROCEDURE — 99214 OFFICE O/P EST MOD 30 MIN: CPT | Performed by: INTERNAL MEDICINE

## 2018-01-29 RX ORDER — ALLOPURINOL 300 MG/1
0.5 TABLET ORAL DAILY
COMMUNITY
Start: 2017-11-17 | End: 2019-07-12

## 2018-01-29 RX ORDER — AMLODIPINE BESYLATE 5 MG/1
5 TABLET ORAL DAILY
Qty: 30 TABLET | Refills: 5 | Status: SHIPPED | OUTPATIENT
Start: 2018-01-29 | End: 2018-07-23

## 2018-01-29 NOTE — PROGRESS NOTES
CC: High blood pressure    History:  Sarah Littlejohn is a 67 y.o. female who presents today for follow-up for evaluation of the above:  She notes she has been having issues over the past few weeks with hypertension.  She notes frequently seeing systolic values in the mid 150s.  She notes her diastolic values have been okay.  She has headaches that are most prominent with elevations in her blood pressure.  She notes her mood has been well-controlled on her Paxil.  She has been trying to lose weight and has successfully lost 14 pounds in the last year, including 7 in the last 6 months.      ROS:  Review of Systems   Constitutional: Negative for chills and fever.   HENT: Negative for congestion and sore throat.    Respiratory: Negative for cough and shortness of breath.    Cardiovascular: Negative for chest pain and palpitations.   Gastrointestinal: Negative for abdominal pain, constipation and nausea.   Musculoskeletal: Negative for back pain and gait problem.   Neurological: Positive for headaches.       Ms. Littlejohn  reports that she has never smoked. She has never used smokeless tobacco. She reports that she does not drink alcohol or use illicit drugs.      Current Outpatient Prescriptions:   •  cetirizine (ZyrTEC) 10 MG tablet, Take 10 mg by mouth Daily., Disp: , Rfl:   •  lisinopril-hydrochlorothiazide (PRINZIDE,ZESTORETIC) 20-12.5 MG per tablet, Take 1 tablet by mouth 2 (Two) Times a Day., Disp: , Rfl:   •  metFORMIN (GLUCOPHAGE) 500 MG tablet, Take 1 tablet by mouth Every Night., Disp: 90 tablet, Rfl: 3  •  oxybutynin XL (DITROPAN-XL) 10 MG 24 hr tablet, Take 1 tablet by mouth Daily., Disp: 30 tablet, Rfl: 5  •  PARoxetine (PAXIL) 40 MG tablet, Take 1 tablet by mouth Every Morning., Disp: 30 tablet, Rfl: 5  •  allopurinol (ZYLOPRIM) 300 MG tablet, Take 1 tablet by mouth Daily., Disp: , Rfl:       OBJECTIVE:  /62 (BP Location: Left arm, Patient Position: Sitting, Cuff Size: Adult)  Pulse 65  Resp 16  " Ht 160 cm (62.99\")  Wt 112 kg (246 lb 8 oz)  SpO2 98%  BMI 43.68 kg/m2   Physical Exam   Constitutional: She is oriented to person, place, and time. She appears well-nourished. No distress.   Cardiovascular: Normal rate, regular rhythm and normal heart sounds.    No murmur heard.  Pulmonary/Chest: Effort normal and breath sounds normal. She has no wheezes.   Neurological: She is alert and oriented to person, place, and time.   Psychiatric: She has a normal mood and affect.       Assessment/Plan    Diagnoses and all orders for this visit:    Essential hypertension  -     amLODIPine (NORVASC) 5 MG tablet; Take 1 tablet by mouth Daily.  Poorly controlled, BP goal for age is <140/90 per JNC 8 guidelines and Add amlodipine 5 mg.  She will take this in the evening and take her Zestoretic at 2 tablets every morning.    CKD (chronic kidney disease) stage 3, GFR 30-59 ml/min  Stable without urinary changes on lisinopril.  She follows with Dr. Santiago.    Morbid obesity due to excess calories  Recommended attention to portion control and being careful about the types and timing of meals for the purpose of weight management.  She is congratulated on steady and maintained weight loss over the last year.    Recurrent major depressive disorder, in partial remission  Stable and well-controlled without exacerbation on Paxil.    Impaired glucose tolerance  She will have labs collected prior to her next appointment with Dr. Santiaog.  These will include labs previously ordered through our office.    Encounter for immunization  -     Flu Vaccine High Dose PF 65YR+ ([id number])  -     Pneumococcal Polysaccharide Vaccine 23-Valent Greater Than or Equal To 3yo Subcutaneous / IM    An After Visit Summary was printed and given to the patient at discharge.  Return in about 6 months (around 7/29/2018) for Recheck. Sooner if problems arise.         Michael Singleton D.O. 1/29/2018   "

## 2018-02-22 DIAGNOSIS — N39.41 URGE INCONTINENCE: ICD-10-CM

## 2018-02-22 DIAGNOSIS — N32.81 OVERACTIVE BLADDER: ICD-10-CM

## 2018-02-22 RX ORDER — OXYBUTYNIN CHLORIDE 10 MG/1
10 TABLET, EXTENDED RELEASE ORAL DAILY
Qty: 90 TABLET | Refills: 2 | Status: SHIPPED | OUTPATIENT
Start: 2018-02-22 | End: 2018-11-26 | Stop reason: SDUPTHER

## 2018-07-18 RX ORDER — PAROXETINE HYDROCHLORIDE 40 MG/1
40 TABLET, FILM COATED ORAL EVERY MORNING
Qty: 30 TABLET | Refills: 5 | Status: SHIPPED | OUTPATIENT
Start: 2018-07-18 | End: 2018-07-23

## 2018-07-23 ENCOUNTER — OFFICE VISIT (OUTPATIENT)
Dept: INTERNAL MEDICINE | Facility: CLINIC | Age: 68
End: 2018-07-23

## 2018-07-23 ENCOUNTER — OFFICE VISIT (OUTPATIENT)
Dept: LAB | Facility: HOSPITAL | Age: 68
End: 2018-07-23

## 2018-07-23 VITALS
DIASTOLIC BLOOD PRESSURE: 68 MMHG | RESPIRATION RATE: 16 BRPM | SYSTOLIC BLOOD PRESSURE: 138 MMHG | HEIGHT: 63 IN | BODY MASS INDEX: 43.09 KG/M2 | WEIGHT: 243.2 LBS | HEART RATE: 68 BPM | OXYGEN SATURATION: 99 %

## 2018-07-23 DIAGNOSIS — R73.02 IMPAIRED GLUCOSE TOLERANCE: ICD-10-CM

## 2018-07-23 DIAGNOSIS — IMO0001 CLASS 3 OBESITY DUE TO EXCESS CALORIES WITH SERIOUS COMORBIDITY AND BODY MASS INDEX (BMI) OF 40.0 TO 44.9 IN ADULT: ICD-10-CM

## 2018-07-23 DIAGNOSIS — I10 ESSENTIAL HYPERTENSION: ICD-10-CM

## 2018-07-23 DIAGNOSIS — Z00.00 ENCOUNTER FOR PREVENTIVE HEALTH EXAMINATION: ICD-10-CM

## 2018-07-23 DIAGNOSIS — F33.41 RECURRENT MAJOR DEPRESSIVE DISORDER, IN PARTIAL REMISSION (HCC): ICD-10-CM

## 2018-07-23 DIAGNOSIS — N18.30 CKD (CHRONIC KIDNEY DISEASE) STAGE 3, GFR 30-59 ML/MIN (HCC): ICD-10-CM

## 2018-07-23 DIAGNOSIS — I10 ESSENTIAL HYPERTENSION: Primary | ICD-10-CM

## 2018-07-23 LAB
ALBUMIN SERPL-MCNC: 4.4 G/DL (ref 3.5–5)
ANION GAP SERPL CALCULATED.3IONS-SCNC: 15 MMOL/L (ref 4–13)
ARTICHOKE IGE QN: 77 MG/DL (ref 0–99)
BUN BLD-MCNC: 41 MG/DL (ref 5–21)
BUN/CREAT SERPL: 24.8 (ref 7–25)
CALCIUM SPEC-SCNC: 9.9 MG/DL (ref 8.4–10.4)
CHLORIDE SERPL-SCNC: 104 MMOL/L (ref 98–110)
CHOLEST SERPL-MCNC: 155 MG/DL (ref 130–200)
CO2 SERPL-SCNC: 23 MMOL/L (ref 24–31)
CREAT BLD-MCNC: 1.65 MG/DL (ref 0.5–1.4)
GFR SERPL CREATININE-BSD FRML MDRD: 31 ML/MIN/1.73
GLUCOSE BLD-MCNC: 100 MG/DL (ref 70–100)
HBA1C MFR BLD: 6 %
HCV AB SER DONR QL: NEGATIVE
HCV S/C RATIO: 0.01 (ref 0–0.99)
HDLC SERPL-MCNC: 41 MG/DL
LDLC/HDLC SERPL: 1.96 {RATIO}
PHOSPHATE SERPL-MCNC: 4.8 MG/DL (ref 2.5–4.5)
POTASSIUM BLD-SCNC: 4.8 MMOL/L (ref 3.5–5.3)
SODIUM BLD-SCNC: 142 MMOL/L (ref 135–145)
TRIGL SERPL-MCNC: 168 MG/DL (ref 0–149)

## 2018-07-23 PROCEDURE — 83036 HEMOGLOBIN GLYCOSYLATED A1C: CPT | Performed by: INTERNAL MEDICINE

## 2018-07-23 PROCEDURE — 80061 LIPID PANEL: CPT | Performed by: INTERNAL MEDICINE

## 2018-07-23 PROCEDURE — 86803 HEPATITIS C AB TEST: CPT | Performed by: INTERNAL MEDICINE

## 2018-07-23 PROCEDURE — 99214 OFFICE O/P EST MOD 30 MIN: CPT | Performed by: INTERNAL MEDICINE

## 2018-07-23 PROCEDURE — 80069 RENAL FUNCTION PANEL: CPT | Performed by: INTERNAL MEDICINE

## 2018-07-23 PROCEDURE — 36415 COLL VENOUS BLD VENIPUNCTURE: CPT

## 2018-07-23 RX ORDER — ESCITALOPRAM OXALATE 10 MG/1
10 TABLET ORAL DAILY
Qty: 30 TABLET | Refills: 5 | Status: SHIPPED | OUTPATIENT
Start: 2018-07-23 | End: 2018-11-26 | Stop reason: SDUPTHER

## 2018-07-23 NOTE — PROGRESS NOTES
"CC: f/u hypertension    History:  Sarah Littlejohn is a 68 y.o. female who presents today for follow-up for evaluation of the above:  She notes she has been doing reasonabley well without acute illness. Her BP has done well on current meds without side effects. She worries that her Paxil is not working as well as it once did and her depression has been worse recently. She stresses and worries a lot regarding her health, kidney disease, etc. She notes no changes in her urinary quantity or quality and she tolerates RAAS blockade without side effects. She has been trying to watch her weight and has successfully lost 17 pounds this year.    ROS:  Review of Systems   Constitutional: Negative for chills and fever.   Respiratory: Negative for cough and shortness of breath.    Cardiovascular: Negative for chest pain and palpitations.   Gastrointestinal: Negative for abdominal pain, constipation and nausea.   Psychiatric/Behavioral: Positive for dysphoric mood. Negative for suicidal ideas.       Ms. Littlejohn  reports that she has never smoked. She has never used smokeless tobacco. She reports that she does not drink alcohol or use drugs.      Current Outpatient Prescriptions:   •  allopurinol (ZYLOPRIM) 300 MG tablet, Take 1 tablet by mouth Daily., Disp: , Rfl:   •  oxybutynin XL (DITROPAN-XL) 10 MG 24 hr tablet, Take 1 tablet by mouth Daily., Disp: 90 tablet, Rfl: 2  •  PARoxetine (PAXIL) 40 MG tablet, Take 1 tablet by mouth Every Morning., Disp: 30 tablet, Rfl: 5  •  escitalopram (LEXAPRO) 10 MG tablet, Take 1 tablet by mouth Daily., Disp: 30 tablet, Rfl: 5  •  lisinopril-hydrochlorothiazide (PRINZIDE,ZESTORETIC) 20-12.5 MG per tablet, Take 2 tablets by mouth Daily., Disp: , Rfl:       OBJECTIVE:  /68 (BP Location: Left arm, Patient Position: Sitting, Cuff Size: Adult)   Pulse 68   Resp 16   Ht 160 cm (62.99\")   Wt 110 kg (243 lb 3.2 oz)   SpO2 99%   Breastfeeding? No   BMI 43.09 kg/m²    Physical Exam "   Constitutional: She is oriented to person, place, and time. She appears well-nourished. No distress.   Cardiovascular: Normal rate, regular rhythm and normal heart sounds.    No murmur heard.  Pulmonary/Chest: Effort normal and breath sounds normal. She has no wheezes.   Abdominal: Soft. There is no tenderness.   Neurological: She is alert and oriented to person, place, and time.   Psychiatric: She has a normal mood and affect.       Assessment/Plan    Sarah was seen today for hypertension.    Diagnoses and all orders for this visit:    Essential hypertension  -     Renal Function Panel; Future  -     Lipid Panel; Future  Well controlled, BP goal for age is <140/90 per JNC 8 guidelines and continue current medications    Recurrent major depressive disorder, in partial remission (CMS/HCC)  -     escitalopram (LEXAPRO) 10 MG tablet; Take 1 tablet by mouth Daily.  Given uncontrolled symptoms, we will change therapy to Lexapro. Ideally, we will gain better control with this and have less risk of side effects and drug interactions given advancing age and polypharmacy. I advised that it will take 3-4 weeks to see some effect and 6-8 weeks to see full effect.  If the dose is ineffective or suboptimal, the patient should notify the clinic for a consideration of a dose increase. The patient denied SI/HI, but was advised that starting this medication could worsen these symptoms. We discussed this as an emergency and reason to seek care immediately. The patient expressed understanding. If uncontrolled, consider addition of wellbutrin or abilify.     CKD (chronic kidney disease) stage 3, GFR 30-59 ml/min  -     Renal Function Panel; Future  Stable on RAAS blockade. Followed by nephrology. GFR stable ~low 30s x several months. D/c metformin.    Impaired glucose tolerance  -     Lipid Panel; Future  -     Hemoglobin A1c; Future  Check A1c, but no A1c >6.5% in Epic. Given this, we will discontinue metformin due to renal  impairment.     Class 3 obesity due to excess calories with serious comorbidity and body mass index (BMI) of 40.0 to 44.9 in adult (CMS/[facility])  Recommended attention to portion control and being careful about the types and timing of meals for the purpose of weight management.    Encounter for preventive health examination  -     Hepatitis C Antibody; Future        An After Visit Summary was printed and given to the patient at discharge.  Return in about 4 months (around 11/23/2018) for Recheck. Sooner if problems arise.         Michael Singleton D.O. 7/23/2018

## 2018-10-15 DIAGNOSIS — E11.9 TYPE 2 DIABETES MELLITUS WITHOUT COMPLICATION, WITHOUT LONG-TERM CURRENT USE OF INSULIN (HCC): ICD-10-CM

## 2018-11-26 ENCOUNTER — OFFICE VISIT (OUTPATIENT)
Dept: INTERNAL MEDICINE | Facility: CLINIC | Age: 68
End: 2018-11-26

## 2018-11-26 VITALS
SYSTOLIC BLOOD PRESSURE: 138 MMHG | WEIGHT: 230.4 LBS | DIASTOLIC BLOOD PRESSURE: 67 MMHG | RESPIRATION RATE: 16 BRPM | BODY MASS INDEX: 40.82 KG/M2 | HEIGHT: 63 IN | OXYGEN SATURATION: 98 % | HEART RATE: 58 BPM

## 2018-11-26 DIAGNOSIS — I10 ESSENTIAL HYPERTENSION: Primary | ICD-10-CM

## 2018-11-26 DIAGNOSIS — N18.30 CKD (CHRONIC KIDNEY DISEASE) STAGE 3, GFR 30-59 ML/MIN (HCC): ICD-10-CM

## 2018-11-26 DIAGNOSIS — N32.81 OVERACTIVE BLADDER: ICD-10-CM

## 2018-11-26 DIAGNOSIS — Z23 ENCOUNTER FOR IMMUNIZATION: ICD-10-CM

## 2018-11-26 DIAGNOSIS — E66.01 CLASS 3 SEVERE OBESITY DUE TO EXCESS CALORIES WITHOUT SERIOUS COMORBIDITY WITH BODY MASS INDEX (BMI) OF 40.0 TO 44.9 IN ADULT (HCC): ICD-10-CM

## 2018-11-26 DIAGNOSIS — F33.41 RECURRENT MAJOR DEPRESSIVE DISORDER, IN PARTIAL REMISSION (HCC): ICD-10-CM

## 2018-11-26 DIAGNOSIS — N39.41 URGE INCONTINENCE: ICD-10-CM

## 2018-11-26 PROCEDURE — 90662 IIV NO PRSV INCREASED AG IM: CPT | Performed by: INTERNAL MEDICINE

## 2018-11-26 PROCEDURE — 99214 OFFICE O/P EST MOD 30 MIN: CPT | Performed by: INTERNAL MEDICINE

## 2018-11-26 PROCEDURE — G0439 PPPS, SUBSEQ VISIT: HCPCS | Performed by: INTERNAL MEDICINE

## 2018-11-26 PROCEDURE — G0008 ADMIN INFLUENZA VIRUS VAC: HCPCS | Performed by: INTERNAL MEDICINE

## 2018-11-26 RX ORDER — OXYBUTYNIN CHLORIDE 10 MG/1
10 TABLET, EXTENDED RELEASE ORAL DAILY
Qty: 90 TABLET | Refills: 3 | Status: SHIPPED | OUTPATIENT
Start: 2018-11-26 | End: 2019-12-05 | Stop reason: SDUPTHER

## 2018-11-26 RX ORDER — ESCITALOPRAM OXALATE 10 MG/1
10 TABLET ORAL DAILY
Qty: 90 TABLET | Refills: 3 | Status: SHIPPED | OUTPATIENT
Start: 2018-11-26 | End: 2019-11-04 | Stop reason: SDUPTHER

## 2018-11-26 NOTE — PROGRESS NOTES
QUICK REFERENCE INFORMATION:  The ABCs of the Annual Wellness Visit    Subsequent Medicare Wellness Visit    HEALTH RISK ASSESSMENT    1950    Recent Hospitalizations:  No hospitalization(s) within the last year..        Current Medical Providers:  Patient Care Team:  Michael Singleton DO as PCP - General (Internal Medicine)  Abel Tello PA as PCP - Claims Attributed  Dewayne Santiago MD as Consulting Physician (Nephrology)  Jose Zeng MD as Consulting Physician (Oncology)  Case, Marek Bravo MD as Consulting Physician (Dermatology)  Hannah Marvin MD as Consulting Physician (Ophthalmology)        Smoking Status:  Social History     Tobacco Use   Smoking Status Never Smoker   Smokeless Tobacco Never Used       Alcohol Consumption:  Social History     Substance and Sexual Activity   Alcohol Use No       Depression Screen:   PHQ-2/PHQ-9 Depression Screening 11/26/2018   Little interest or pleasure in doing things 0   Feeling down, depressed, or hopeless 0   Total Score 0       Health Habits and Functional and Cognitive Screening:  Functional & Cognitive Status 11/26/2018   Do you have difficulty preparing food and eating? No   Do you have difficulty bathing yourself, getting dressed or grooming yourself? No   Do you have difficulty using the toilet? No   Do you have difficulty moving around from place to place? No   Do you have trouble with steps or getting out of a bed or a chair? No   In the past year have you fallen or experienced a near fall? No   Current Diet Well Balanced Diet   Dental Exam Not up to date   Eye Exam Not up to date   Exercise (times per week) 4 times per week   Current Exercise Activities Include Walking   Do you need help using the phone?  No   Are you deaf or do you have serious difficulty hearing?  No   Do you need help with transportation? No   Do you need help shopping? No   Do you need help preparing meals?  No   Do you need help with housework?  No    Do you need help with laundry? No   Do you need help taking your medications? No   Do you need help managing money? No   Do you ever drive or ride in a car without wearing a seat belt? No   Have you felt unusual stress, anger or loneliness in the last month? No   Who do you live with? Spouse   If you need help, do you have trouble finding someone available to you? No   Have you been bothered in the last four weeks by sexual problems? No   Do you have difficulty concentrating, remembering or making decisions? No           Does the patient have evidence of cognitive impairment? No    Aspirin use counseling: Does not need ASA (and currently is not on it)      Recent Lab Results:  CMP:  Lab Results   Component Value Date    BUN 41 (H) 07/23/2018    CREATININE 1.65 (H) 07/23/2018    EGFRIFNONA 31 (L) 07/23/2018    BCR 24.8 07/23/2018     07/23/2018    K 4.8 07/23/2018    CO2 23.0 (L) 07/23/2018    CALCIUM 9.9 07/23/2018    PROTENTOTREF 6.8 11/07/2016    ALBUMIN 4.40 07/23/2018    LABGLOBREF 3.6 11/07/2016    LABIL2 0.9 11/07/2016    BILITOT 0.6 07/21/2016    ALKPHOS 95 07/21/2016    AST 31 07/21/2016    ALT 23 07/21/2016     Lipid Panel:  Lab Results   Component Value Date    CHOL 155 07/23/2018    TRIG 168 (H) 07/23/2018    HDL 41 (L) 07/23/2018    VLDL 21 04/06/2016    LDLHDL 1.96 07/23/2018     HbA1c:  Lab Results   Component Value Date    HGBA1C 6.0 07/23/2018       Visual Acuity:  No exam data present    Age-appropriate Screening Schedule:  Refer to the list below for future screening recommendations based on patient's age, sex and/or medical conditions. Orders for these recommended tests are listed in the plan section. The patient has been provided with a written plan.    Health Maintenance   Topic Date Due   • TDAP/TD VACCINES (1 - Tdap) 02/10/1970   • ZOSTER VACCINE (1 of 2) 02/10/2001   • INFLUENZA VACCINE  08/01/2018   • MAMMOGRAM  11/27/2019   • COLONOSCOPY  06/08/2026   • PNEUMOCOCCAL VACCINES (65+  LOW/MEDIUM RISK)  Completed   • HEMOGLOBIN A1C  Discontinued        Subjective   History of Present Illness    Sarah Littlejohn is a 68 y.o. female who presents for an Subsequent Wellness Visit.    The following portions of the patient's history were reviewed and updated as appropriate: allergies, current medications, past family history, past medical history, past social history, past surgical history and problem list.    Outpatient Medications Prior to Visit   Medication Sig Dispense Refill   • allopurinol (ZYLOPRIM) 300 MG tablet Take 1 tablet by mouth Daily. 1/2 tablet daily     • lisinopril-hydrochlorothiazide (PRINZIDE,ZESTORETIC) 20-12.5 MG per tablet Take 2 tablets by mouth Daily.     • escitalopram (LEXAPRO) 10 MG tablet Take 1 tablet by mouth Daily. 30 tablet 5   • oxybutynin XL (DITROPAN-XL) 10 MG 24 hr tablet Take 1 tablet by mouth Daily. 90 tablet 2     No facility-administered medications prior to visit.        Patient Active Problem List   Diagnosis   • Essential hypertension   • Class 3 severe obesity due to excess calories without serious comorbidity with body mass index (BMI) of 40.0 to 44.9 in adult (CMS/[facility])   • CKD (chronic kidney disease) stage 3, GFR 30-59 ml/min (CMS/HCC)   • Recurrent major depressive disorder, in partial remission (CMS/HCC)   • MGUS (monoclonal gammopathy of unknown significance)   • Impaired glucose tolerance   • Urge incontinence   • Asymptomatic hyperuricemia       Advance Care Planning:  has an advance directive - a copy HAS NOT been provided. Have asked the patient to send this to us to add to record.    Identification of Risk Factors:  Risk factors include: weight , unhealthy diet, cardiovascular risk, inactivity and incontinence.    Review of Systems See  note    Compared to one year ago, the patient feels her physical health is the same.  Compared to one year ago, the patient feels her mental health is the same.    Objective     Physical Exam See  " note    Vitals:    11/26/18 1125   BP: 138/67   BP Location: Left arm   Patient Position: Sitting   Cuff Size: Adult   Pulse: 58   Resp: 16   SpO2: 98%   Weight: 105 kg (230 lb 6.4 oz)   Height: 160 cm (62.99\")       Patient's Body mass index is 40.83 kg/m². BMI is above normal parameters. Recommendations include: exercise counseling and nutrition counseling.      Assessment/Plan   Patient Self-Management and Personalized Health Advice  The patient has been provided with information about: diet, exercise, weight management and designing advance directives and preventive services including:   · Advance directive, Exercise counseling provided, Fall Risk assessment done, Influenza vaccine, TdaP vaccine, Shingrix..    Visit Diagnoses:    ICD-10-CM ICD-9-CM   1. Class 3 severe obesity due to excess calories without serious comorbidity with body mass index (BMI) of 40.0 to 44.9 in adult (CMS/[facility]) E66.01 278.01    Z68.41 V85.41   2. Overactive bladder N32.81 596.51   3. Urge incontinence N39.41 788.31   4. Recurrent major depressive disorder, in partial remission (CMS/[facility]) F33.41 296.35   5. CKD (chronic kidney disease) stage 3, GFR 30-59 ml/min (CMS/[facility]) N18.3 585.3   6. Essential hypertension I10 401.9   7. Encounter for immunization Z23 V03.89       Orders Placed This Encounter   Procedures   • Flu Vaccine High Dose PF 65YR+ ([id number])       Outpatient Encounter Medications as of 11/26/2018   Medication Sig Dispense Refill   • allopurinol (ZYLOPRIM) 300 MG tablet Take 1 tablet by mouth Daily. 1/2 tablet daily     • escitalopram (LEXAPRO) 10 MG tablet Take 1 tablet by mouth Daily. 90 tablet 3   • lisinopril-hydrochlorothiazide (PRINZIDE,ZESTORETIC) 20-12.5 MG per tablet Take 2 tablets by mouth Daily.     • oxybutynin XL (DITROPAN-XL) 10 MG 24 hr tablet Take 1 tablet by mouth Daily. 90 tablet 3   • [DISCONTINUED] escitalopram (LEXAPRO) 10 MG tablet Take 1 tablet by mouth Daily. 30 tablet 5   • [DISCONTINUED] " oxybutynin XL (DITROPAN-XL) 10 MG 24 hr tablet Take 1 tablet by mouth Daily. 90 tablet 2     No facility-administered encounter medications on file as of 11/26/2018.        Reviewed use of high risk medication in the elderly: yes  Reviewed for potential of harmful drug interactions in the elderly: yes    Follow Up:  Return in about 6 months (around 5/26/2019) for Recheck.     An After Visit Summary and PPPS with all of these plans were given to the patient.

## 2018-11-26 NOTE — PROGRESS NOTES
"CC: \"trouble with stomach\"    History:  Sarah Littlejohn is a 68 y.o. female who presents today for follow-up for evaluation of the above:  She notes she has been doing reasonably well, but has occasional nausea with a sensation of needing to have a bowel movement.  She has intermittent bouts of worsened nausea and vomiting.  She does not feel this requires treatment and she has no acid reflux symptoms at the time, but prefers to monitor for now.  Her overactive bladder has been reasonably well-controlled with oxybutynin, though she takes it in the morning and notes she has incontinence spells overnight.  She continues on her blood pressure medication without any side effects and does continue to follow with nephrology related to chronic kidney disease.  She denies changes in her urine quality or quantity.      ROS:  Review of Systems   Constitutional: Negative for chills and fever.   Respiratory: Negative for cough and shortness of breath.    Cardiovascular: Negative for chest pain and palpitations.   Gastrointestinal: Positive for nausea and vomiting. Negative for abdominal pain and constipation.       Ms. Littlejohn  reports that  has never smoked. she has never used smokeless tobacco. She reports that she does not drink alcohol or use drugs.      Current Outpatient Medications:   •  allopurinol (ZYLOPRIM) 300 MG tablet, Take 1 tablet by mouth Daily. 1/2 tablet daily, Disp: , Rfl:   •  escitalopram (LEXAPRO) 10 MG tablet, Take 1 tablet by mouth Daily., Disp: 30 tablet, Rfl: 5  •  lisinopril-hydrochlorothiazide (PRINZIDE,ZESTORETIC) 20-12.5 MG per tablet, Take 2 tablets by mouth Daily., Disp: , Rfl:   •  oxybutynin XL (DITROPAN-XL) 10 MG 24 hr tablet, Take 1 tablet by mouth Daily., Disp: 90 tablet, Rfl: 2      OBJECTIVE:  /67 (BP Location: Left arm, Patient Position: Sitting, Cuff Size: Adult)   Pulse 58   Resp 16   Ht 160 cm (62.99\")   Wt 105 kg (230 lb 6.4 oz)   SpO2 98%   Breastfeeding? No   BMI " 40.83 kg/m²    Physical Exam   Constitutional: She is oriented to person, place, and time. She appears well-nourished. No distress.   Cardiovascular: Normal rate, regular rhythm and normal heart sounds.   No murmur heard.  Pulmonary/Chest: Effort normal and breath sounds normal. She has no wheezes.   Neurological: She is alert and oriented to person, place, and time.   Psychiatric: She has a normal mood and affect.       Assessment/Plan    Diagnoses and all orders for this visit:    Essential hypertension  Well controlled, BP goal for age is <140/90 per JNC 8 guidelines and continue current medications    Overactive bladder  Urge incontinence  -     oxybutynin XL (DITROPAN-XL) 10 MG 24 hr tablet; Take 1 tablet by mouth Daily.  Well controlled with Ditropan.  She could alter the timing of her dose to see if a afternoon or evening dose would improve her overnight incontinence.    CKD (chronic kidney disease) stage 3, GFR 30-59 ml/min (CMS/[facility])  She continues on RAAS blockade with no new symptoms.  Following with nephrology.    Recurrent major depressive disorder, in partial remission (CMS/[facility])  -     escitalopram (LEXAPRO) 10 MG tablet; Take 1 tablet by mouth Daily.  Well-controlled on Lexapro and she does not feel a dose change is required at this time.    Class 3 severe obesity due to excess calories without serious comorbidity with body mass index (BMI) of 40.0 to 44.9 in adult (CMS/[facility])  Recommended attention to portion control and being careful about the types and timing of meals for the purpose of weight management.    Encounter for immunization  -     Flu Vaccine High Dose PF 65YR+ ([id number])    An After Visit Summary was printed and given to the patient at discharge.  Return in about 6 months (around 5/26/2019) for Recheck. Sooner if problems arise.         Michael Singleton D.O. 11/26/2018

## 2018-11-26 NOTE — PATIENT INSTRUCTIONS
Medicare Wellness  Personal Prevention Plan of Service     Date of Office Visit:  2018  Encounter Provider:  Michael Singleton DO  Place of Service:  [facility]  Patient Name: Sarah Littlejohn  :  1950    As part of the Medicare Wellness portion of your visit today, we are providing you with this personalized preventive plan of services (PPPS). This plan is based upon recommendations of the [place] Preventive Services Task Force (USPSTF) and the Advisory Committee on Immunization Practices (ACIP).    This lists the preventive care services that should be considered, and provides dates of when you are due. Items listed as completed are up-to-date and do not require any further intervention.    Health Maintenance   Topic Date Due   • TDAP/TD VACCINES (1 - Tdap) 02/10/1970   • ZOSTER VACCINE (1 of 2) 02/10/2001   • MEDICARE ANNUAL WELLNESS  2018   • INFLUENZA VACCINE  2018   • MAMMOGRAM  2019   • COLONOSCOPY  2026   • HEPATITIS C SCREENING  Completed   • PNEUMOCOCCAL VACCINES (65+ LOW/MEDIUM RISK)  Completed   • HEMOGLOBIN A1C  Discontinued       Orders Placed This Encounter   Procedures   • Flu Vaccine High Dose PF 65YR+ ([id number])       Return in about 6 months (around 2019) for Recheck.

## 2019-01-31 ENCOUNTER — TRANSCRIBE ORDERS (OUTPATIENT)
Dept: ADMINISTRATIVE | Facility: HOSPITAL | Age: 69
End: 2019-01-31

## 2019-01-31 DIAGNOSIS — Z12.31 ENCOUNTER FOR SCREENING MAMMOGRAM FOR MALIGNANT NEOPLASM OF BREAST: Primary | ICD-10-CM

## 2019-02-07 ENCOUNTER — HOSPITAL ENCOUNTER (OUTPATIENT)
Dept: MAMMOGRAPHY | Facility: HOSPITAL | Age: 69
Discharge: HOME | End: 2019-02-07
Attending: INTERNAL MEDICINE | Admitting: INTERNAL MEDICINE

## 2019-02-07 DIAGNOSIS — Z12.31 ENCOUNTER FOR SCREENING MAMMOGRAM FOR MALIGNANT NEOPLASM OF BREAST: ICD-10-CM

## 2019-02-07 PROCEDURE — 77063 BREAST TOMOSYNTHESIS BI: CPT

## 2019-02-07 PROCEDURE — 77067 SCR MAMMO BI INCL CAD: CPT

## 2019-02-12 ENCOUNTER — TRANSCRIBE ORDERS (OUTPATIENT)
Dept: ADMINISTRATIVE | Facility: HOSPITAL | Age: 69
End: 2019-02-12

## 2019-02-12 DIAGNOSIS — R92.8 ABNORMAL MAMMOGRAM: ICD-10-CM

## 2019-02-12 DIAGNOSIS — N63.0 BREAST NODULE: Primary | ICD-10-CM

## 2019-02-14 ENCOUNTER — HOSPITAL ENCOUNTER (OUTPATIENT)
Dept: ULTRASOUND IMAGING | Facility: HOSPITAL | Age: 69
Discharge: HOME | End: 2019-02-14

## 2019-02-14 ENCOUNTER — HOSPITAL ENCOUNTER (OUTPATIENT)
Dept: MAMMOGRAPHY | Facility: HOSPITAL | Age: 69
Discharge: HOME | End: 2019-02-14
Admitting: INTERNAL MEDICINE

## 2019-02-14 DIAGNOSIS — N63.0 BREAST NODULE: ICD-10-CM

## 2019-02-14 DIAGNOSIS — R92.8 ABNORMAL MAMMOGRAM: ICD-10-CM

## 2019-02-14 PROCEDURE — 76642 ULTRASOUND BREAST LIMITED: CPT

## 2019-02-21 ENCOUNTER — TRANSCRIBE ORDERS (OUTPATIENT)
Dept: ADMINISTRATIVE | Facility: HOSPITAL | Age: 69
End: 2019-02-21

## 2019-02-21 DIAGNOSIS — R92.8 ABNORMAL MAMMOGRAM: ICD-10-CM

## 2019-02-21 DIAGNOSIS — C50.912 NEOPLASM OF LEFT BREAST, PRIMARY TUMOR STAGING CATEGORY T4B: ULCERATION AND/OR IPSILATERAL SATELLITE NODULES AND/OR EDEMA (INCLUDING PEAU D'ORANGE) OF SKIN, EXCLUDING INFLAMMATORY CARCINOMA (HCC): Primary | ICD-10-CM

## 2019-02-21 DIAGNOSIS — N63.0 BREAST NODULE: ICD-10-CM

## 2019-02-25 ENCOUNTER — APPOINTMENT (OUTPATIENT)
Dept: ULTRASOUND IMAGING | Facility: HOSPITAL | Age: 69
End: 2019-02-25

## 2019-02-25 ENCOUNTER — APPOINTMENT (OUTPATIENT)
Dept: MAMMOGRAPHY | Facility: HOSPITAL | Age: 69
End: 2019-02-25

## 2019-05-30 ENCOUNTER — OFFICE VISIT (OUTPATIENT)
Dept: INTERNAL MEDICINE | Facility: CLINIC | Age: 69
End: 2019-05-30

## 2019-05-30 VITALS
RESPIRATION RATE: 16 BRPM | BODY MASS INDEX: 44.1 KG/M2 | HEIGHT: 63 IN | DIASTOLIC BLOOD PRESSURE: 70 MMHG | WEIGHT: 248.9 LBS | OXYGEN SATURATION: 97 % | SYSTOLIC BLOOD PRESSURE: 132 MMHG | HEART RATE: 53 BPM

## 2019-05-30 DIAGNOSIS — E66.01 CLASS 3 SEVERE OBESITY DUE TO EXCESS CALORIES WITHOUT SERIOUS COMORBIDITY WITH BODY MASS INDEX (BMI) OF 40.0 TO 44.9 IN ADULT (HCC): ICD-10-CM

## 2019-05-30 DIAGNOSIS — N18.30 CKD (CHRONIC KIDNEY DISEASE) STAGE 3, GFR 30-59 ML/MIN (HCC): ICD-10-CM

## 2019-05-30 DIAGNOSIS — F33.42 RECURRENT MAJOR DEPRESSIVE DISORDER, IN FULL REMISSION (HCC): ICD-10-CM

## 2019-05-30 DIAGNOSIS — I10 ESSENTIAL HYPERTENSION: Primary | ICD-10-CM

## 2019-05-30 PROCEDURE — 99214 OFFICE O/P EST MOD 30 MIN: CPT | Performed by: INTERNAL MEDICINE

## 2019-05-30 NOTE — PROGRESS NOTES
"CC: Follow-up hypertension    History:  Sarah Littlejohn is a 69 y.o. female   She notes she has been doing well without any acute illness.  Her Zestoretic was decreased by her nephrologist and she notes her blood pressure has done well without side effects.  Her allopurinol has also been decreased to half tablet daily.  Her mood has done very well and she does continue on Lexapro.  Urge incontinence is well controlled on oxybutynin.    ROS:  Review of Systems   Constitutional: Negative for chills and fever.   Respiratory: Negative for cough and shortness of breath.    Cardiovascular: Negative for chest pain and palpitations.   Gastrointestinal: Negative for abdominal pain and constipation.   Genitourinary: Negative for difficulty urinating and dysuria.        reports that she has never smoked. She has never used smokeless tobacco. She reports that she does not drink alcohol or use drugs.      Current Outpatient Medications:   •  allopurinol (ZYLOPRIM) 300 MG tablet, Take 0.5 tablets by mouth Daily. 1/2 tablet daily, Disp: , Rfl:   •  escitalopram (LEXAPRO) 10 MG tablet, Take 1 tablet by mouth Daily., Disp: 90 tablet, Rfl: 3  •  lisinopril-hydrochlorothiazide (PRINZIDE,ZESTORETIC) 20-12.5 MG per tablet, Take 1 tablet by mouth Daily., Disp: , Rfl:   •  oxybutynin XL (DITROPAN-XL) 10 MG 24 hr tablet, Take 1 tablet by mouth Daily., Disp: 90 tablet, Rfl: 3    OBJECTIVE:  /70 (BP Location: Left arm, Patient Position: Sitting, Cuff Size: Adult)   Pulse 53   Resp 16   Ht 160 cm (62.99\")   Wt 113 kg (248 lb 14.4 oz)   SpO2 97%   Breastfeeding? No   BMI 44.10 kg/m²    Physical Exam   Constitutional: She is oriented to person, place, and time. She appears well-nourished. No distress.   Cardiovascular: Normal rate, regular rhythm and normal heart sounds.   No murmur heard.  Pulmonary/Chest: Effort normal and breath sounds normal. She has no wheezes.   Neurological: She is alert and oriented to person, place, " and time.   Psychiatric: She has a normal mood and affect.       Assessment/Plan    Diagnoses and all orders for this visit:    Essential hypertension  Well controlled, BP goal for age is <140/90 per JNC 8 guidelines and continue current medications    Recurrent major depressive disorder, in full remission (CMS/[facility])  Doing well on Lexapro. Consider decrease to 1/2 tablet as tolerated.     CKD (chronic kidney disease) stage 3, GFR 30-59 ml/min (CMS/[facility])  Stable on RAAS blockade without urinary changes. Following with nephrology.     Class 3 severe obesity due to excess calories without serious comorbidity with body mass index (BMI) of 40.0 to 44.9 in adult (CMS/[facility])  Recommended attention to portion control and being careful about the types and timing of meals for the purpose of weight management.      An After Visit Summary was printed and given to the patient at discharge.  Return in about 6 months (around 11/30/2019) for Medicare Wellness.         Michael Singleton D.O. 5/30/2019

## 2019-07-12 ENCOUNTER — OFFICE VISIT (OUTPATIENT)
Dept: OBSTETRICS AND GYNECOLOGY | Facility: CLINIC | Age: 69
End: 2019-07-12

## 2019-07-12 VITALS
WEIGHT: 248 LBS | BODY MASS INDEX: 43.94 KG/M2 | SYSTOLIC BLOOD PRESSURE: 130 MMHG | HEIGHT: 63 IN | DIASTOLIC BLOOD PRESSURE: 60 MMHG

## 2019-07-12 DIAGNOSIS — Z12.4 PAPANICOLAOU SMEAR: Primary | ICD-10-CM

## 2019-07-12 DIAGNOSIS — Z78.9 NON-SMOKER: ICD-10-CM

## 2019-07-12 DIAGNOSIS — R87.619 ABNORMAL CYTOLOGICAL FINDING IN SPECIMEN FROM CERVIX UTERI: ICD-10-CM

## 2019-07-12 PROCEDURE — 87624 HPV HI-RISK TYP POOLED RSLT: CPT | Performed by: NURSE PRACTITIONER

## 2019-07-12 PROCEDURE — G0123 SCREEN CERV/VAG THIN LAYER: HCPCS | Performed by: NURSE PRACTITIONER

## 2019-07-12 PROCEDURE — 99203 OFFICE O/P NEW LOW 30 MIN: CPT | Performed by: NURSE PRACTITIONER

## 2019-07-12 NOTE — PROGRESS NOTES
"Santa Littlejohn is a 69 y.o. female.     Lump in vagina    Pt reports she noticed the lump about a wk ago.         The following portions of the patient's history were reviewed and updated as appropriate: allergies, current medications, past family history, past medical history, past social history, past surgical history and problem list.    /60 (BP Location: Left arm, Patient Position: Sitting, Cuff Size: Large Adult)   Ht 160 cm (63\")   Wt 112 kg (248 lb)   BMI 43.93 kg/m²     Review of Systems   Constitutional: Negative for activity change, appetite change, fatigue and fever.   Respiratory: Negative for apnea and shortness of breath.    Cardiovascular: Negative for chest pain and palpitations.   Gastrointestinal: Negative for abdominal distention, abdominal pain, constipation, diarrhea, nausea and vomiting.   Endocrine: Negative for cold intolerance and heat intolerance.   Genitourinary: Negative for difficulty urinating, frequency, genital sores, menstrual problem, pelvic pain, vaginal discharge and vaginal pain.        Pt reports she started oxybutynin approx a yr ago r/t urinary frequency and leakage, pt reports she is not longer having symptoms.    Neurological: Negative for headaches.   Psychiatric/Behavioral: Negative for agitation and sleep disturbance.       Objective   Physical Exam   Constitutional: She is oriented to person, place, and time. She appears well-developed.   HENT:   Head: Normocephalic.   Neck: No tracheal deviation present.   Cardiovascular: Normal rate.   Pulmonary/Chest: Breath sounds normal. She has no wheezes.   Abdominal: Soft. There is no tenderness.   Genitourinary: Rectum normal. There is no rash, tenderness or lesion on the right labia. There is no rash, tenderness or lesion on the left labia. Uterus is not deviated, not enlarged and not tender. Cervix exhibits no motion tenderness and no discharge. Right adnexum displays no mass, no tenderness and no " "fullness. Left adnexum displays no mass, no tenderness and no fullness. There is erythema in the vagina. No tenderness in the vagina. No vaginal discharge found.   Genitourinary Comments: Cystocele noted (1st degree)   Lymphadenopathy:        Right: No inguinal adenopathy present.        Left: No inguinal adenopathy present.   Neurological: She is alert and oriented to person, place, and time. She has normal strength.   Skin: Skin is warm and dry. No rash noted. No cyanosis.   Psychiatric: She has a normal mood and affect. Her speech is normal and behavior is normal.       Assessment/Plan   Pap collected r/t pt has not had a pap in 10 years.     Discussed \"lump on vagina\".   Pt points to labial tissue when asked to point to the lump.   There is no bump present today, erythema present.  Reviewed S&S to report, pt voiced understanding.     Discussed cystocele. Pt denies current symptoms.     Pt reports Dr. Zeng follows mammograms and does breast exams.     Patient's Body mass index is 43.93 kg/m². BMI is above normal parameters. Recommendations include: educational material.    RV annual exam/prn.   Sarah was seen today for mass.    Diagnoses and all orders for this visit:    Papanicolaou smear  -     Liquid-based Pap Smear, Screening  -     HPV DNA Probe, Direct - ThinPrep Vial,; Future  -     Cancel: HPV DNA Probe, Direct - ThinPrep Vial, Cervix  -     HPV DNA Probe, Direct - ThinPrep Vial, Cervix    BMI 40.0-44.9, adult (CMS/[facility])  -     HPV DNA Probe, Direct - ThinPrep Vial, Cervix    Non-smoker  -     HPV DNA Probe, Direct - ThinPrep Vial, Cervix    Abnormal cytological finding in specimen from cervix uteri   -     HPV DNA Probe, Direct - ThinPrep Vial, Cervix                "

## 2019-07-15 PROCEDURE — 87624 HPV HI-RISK TYP POOLED RSLT: CPT | Performed by: NURSE PRACTITIONER

## 2019-07-16 LAB
GEN CATEG CVX/VAG CYTO-IMP: NORMAL
HPV I/H RISK 4 DNA CVX QL PROBE+SIG AMP: NOT DETECTED
LAB AP CASE REPORT: NORMAL
LAB AP GYN ADDITIONAL INFORMATION: NORMAL
LAB AP GYN OTHER FINDINGS: NORMAL
PATH INTERP SPEC-IMP: NORMAL
STAT OF ADQ CVX/VAG CYTO-IMP: NORMAL

## 2019-11-02 DIAGNOSIS — F33.41 RECURRENT MAJOR DEPRESSIVE DISORDER, IN PARTIAL REMISSION (HCC): ICD-10-CM

## 2019-11-02 RX ORDER — ESCITALOPRAM OXALATE 10 MG/1
TABLET ORAL
Qty: 90 TABLET | Refills: 3 | Status: CANCELLED | OUTPATIENT
Start: 2019-11-02

## 2019-11-04 DIAGNOSIS — F33.41 RECURRENT MAJOR DEPRESSIVE DISORDER, IN PARTIAL REMISSION (HCC): ICD-10-CM

## 2019-11-04 RX ORDER — ESCITALOPRAM OXALATE 10 MG/1
10 TABLET ORAL DAILY
Qty: 90 TABLET | Refills: 3 | Status: SHIPPED | OUTPATIENT
Start: 2019-11-04 | End: 2021-01-28

## 2019-12-05 ENCOUNTER — OFFICE VISIT (OUTPATIENT)
Dept: INTERNAL MEDICINE | Facility: CLINIC | Age: 69
End: 2019-12-05

## 2019-12-05 VITALS
SYSTOLIC BLOOD PRESSURE: 136 MMHG | HEIGHT: 63 IN | WEIGHT: 244.8 LBS | OXYGEN SATURATION: 98 % | HEART RATE: 65 BPM | RESPIRATION RATE: 16 BRPM | DIASTOLIC BLOOD PRESSURE: 78 MMHG | BODY MASS INDEX: 43.38 KG/M2

## 2019-12-05 DIAGNOSIS — N39.41 URGE INCONTINENCE: ICD-10-CM

## 2019-12-05 DIAGNOSIS — F33.42 RECURRENT MAJOR DEPRESSIVE DISORDER, IN FULL REMISSION (HCC): ICD-10-CM

## 2019-12-05 DIAGNOSIS — R73.02 IMPAIRED GLUCOSE TOLERANCE: ICD-10-CM

## 2019-12-05 DIAGNOSIS — E66.01 CLASS 3 SEVERE OBESITY DUE TO EXCESS CALORIES WITH SERIOUS COMORBIDITY AND BODY MASS INDEX (BMI) OF 40.0 TO 44.9 IN ADULT (HCC): ICD-10-CM

## 2019-12-05 DIAGNOSIS — Z23 FLU VACCINE NEED: ICD-10-CM

## 2019-12-05 DIAGNOSIS — N18.30 CKD (CHRONIC KIDNEY DISEASE) STAGE 3, GFR 30-59 ML/MIN (HCC): ICD-10-CM

## 2019-12-05 DIAGNOSIS — N32.81 OVERACTIVE BLADDER: ICD-10-CM

## 2019-12-05 DIAGNOSIS — I10 ESSENTIAL HYPERTENSION: Primary | ICD-10-CM

## 2019-12-05 PROCEDURE — G0008 ADMIN INFLUENZA VIRUS VAC: HCPCS | Performed by: INTERNAL MEDICINE

## 2019-12-05 PROCEDURE — 90653 IIV ADJUVANT VACCINE IM: CPT | Performed by: INTERNAL MEDICINE

## 2019-12-05 PROCEDURE — 99214 OFFICE O/P EST MOD 30 MIN: CPT | Performed by: INTERNAL MEDICINE

## 2019-12-05 PROCEDURE — G0439 PPPS, SUBSEQ VISIT: HCPCS | Performed by: INTERNAL MEDICINE

## 2019-12-05 RX ORDER — OXYBUTYNIN CHLORIDE 10 MG/1
10 TABLET, EXTENDED RELEASE ORAL DAILY
Qty: 90 TABLET | Refills: 3 | Status: SHIPPED | OUTPATIENT
Start: 2019-12-05 | End: 2020-05-11

## 2019-12-05 NOTE — PROGRESS NOTES
The ABCs of the Annual Wellness Visit  Subsequent Medicare Wellness Visit    No chief complaint on file.  See  note    Subjective   History of Present Illness:  Sarah Littlejohn is a 69 y.o. female who presents for a Subsequent Medicare Wellness Visit.    HEALTH RISK ASSESSMENT    Recent Hospitalizations:  No hospitalization(s) within the last year.    Current Medical Providers:  Patient Care Team:  Michael Singleton DO as PCP - General (Internal Medicine)  Michael Singleton DO as PCP - Claims Attributed  [facility] as Consulting Physician (Nephrology)  Jose Zeng MD as Consulting Physician (Oncology)  Case, Marek Bravo MD as Consulting Physician (Dermatology)  Hannah Marvin MD as Consulting Physician (Ophthalmology)    Smoking Status:  Social History     Tobacco Use   Smoking Status Never Smoker   Smokeless Tobacco Never Used       Alcohol Consumption:  Social History     Substance and Sexual Activity   Alcohol Use No       Depression Screen:   PHQ-2/PHQ-9 Depression Screening 7/12/2019   Little interest or pleasure in doing things 0   Feeling down, depressed, or hopeless 0   Total Score 0       Fall Risk Screen:  STEADI Fall Risk Assessment was completed, and patient is at MODERATE risk for falls. Assessment completed on:12/5/2019    Health Habits and Functional and Cognitive Screening:  Functional & Cognitive Status 12/5/2019   Do you have difficulty preparing food and eating? No   Do you have difficulty bathing yourself, getting dressed or grooming yourself? No   Do you have difficulty using the toilet? No   Do you have difficulty moving around from place to place? No   Do you have trouble with steps or getting out of a bed or a chair? No   Current Diet Unhealthy Diet   Dental Exam Not up to date   Eye Exam Not up to date   Exercise (times per week) 0 times per week   Current Exercise Activities Include None   Do you need help using the phone?  No   Are  you deaf or do you have serious difficulty hearing?  No   Do you need help with transportation? No   Do you need help shopping? No   Do you need help preparing meals?  No   Do you need help with housework?  No   Do you need help with laundry? No   Do you need help taking your medications? No   Do you need help managing money? No   Do you ever drive or ride in a car without wearing a seat belt? No   Have you felt unusual stress, anger or loneliness in the last month? No   Who do you live with? Spouse   If you need help, do you have trouble finding someone available to you? No   Have you been bothered in the last four weeks by sexual problems? No   Do you have difficulty concentrating, remembering or making decisions? No         Does the patient have evidence of cognitive impairment? No    Asprin use counseling:Does not need ASA (and currently is not on it)    Age-appropriate Screening Schedule:  Refer to the list below for future screening recommendations based on patient's age, sex and/or medical conditions. Orders for these recommended tests are listed in the plan section. The patient has been provided with a written plan.    Health Maintenance   Topic Date Due   • TDAP/TD VACCINES (1 - Tdap) 02/10/1970   • ZOSTER VACCINE (1 of 2) 02/10/2001   • INFLUENZA VACCINE  08/01/2019   • DXA SCAN  12/05/2019   • MAMMOGRAM  02/07/2021   • COLONOSCOPY  06/08/2026   • PNEUMOCOCCAL VACCINES (65+ LOW/MEDIUM RISK)  Completed   • HEMOGLOBIN A1C  Discontinued          The following portions of the patient's history were reviewed and updated as appropriate: allergies, current medications, past family history, past medical history, past social history, past surgical history and problem list.    Outpatient Medications Prior to Visit   Medication Sig Dispense Refill   • escitalopram (LEXAPRO) 10 MG tablet Take 1 tablet by mouth Daily. 90 tablet 3   • lisinopril-hydrochlorothiazide (PRINZIDE,ZESTORETIC) 20-12.5 MG per tablet Take 1  "tablet by mouth Daily.     • oxybutynin XL (DITROPAN-XL) 10 MG 24 hr tablet Take 1 tablet by mouth Daily. 90 tablet 3     No facility-administered medications prior to visit.        Patient Active Problem List   Diagnosis   • Essential hypertension   • Class 3 severe obesity due to excess calories with serious comorbidity and body mass index (BMI) of 40.0 to 44.9 in adult (CMS/[facility])   • CKD (chronic kidney disease) stage 3, GFR 30-59 ml/min (CMS/[facility])   • Recurrent major depressive disorder, in full remission (CMS/[facility])   • MGUS (monoclonal gammopathy of unknown significance)   • Impaired glucose tolerance   • Urge incontinence   • Asymptomatic hyperuricemia       Advanced Care Planning:  Patient has an advance directive - a copy has not been provided. Have asked the patient to send this to us to add to record    Review of Systems See  note    Compared to one year ago, the patient feels her physical health is the same.  Compared to one year ago, the patient feels her mental health is the same.    Reviewed chart for potential of high risk medication in the elderly: yes  Reviewed chart for potential of harmful drug interactions in the elderly:yes    Objective         Vitals:    12/05/19 1151   BP: 136/78   BP Location: Left arm   Patient Position: Sitting   Cuff Size: Adult   Pulse: 65   Resp: 16   SpO2: 98%   Weight: 111 kg (244 lb 12.8 oz)   Height: 160 cm (63\")       Body mass index is 43.36 kg/m².  Discussed the patient's BMI with her. The BMI is above average; BMI management plan is completed.    Physical Exam See  note          Assessment/Plan   Medicare Risks and Personalized Health Plan  CMS Preventative Services Quick Reference  Advance Directive Discussion  Breast Cancer/Mammogram Screening  Depression/Dysphoria  Fall Risk  Immunizations Discussed/Encouraged (specific immunizations; adacel Tdap, Influenza and Shingrix )  Obesity/Overweight   Osteoprorosis Risk    The above risks/problems have " been discussed with the patient.  Pertinent information has been shared with the patient in the After Visit Summary.  Follow up plans and orders are seen below in the Assessment/Plan Section.    Diagnoses and all orders for this visit:    1. Essential hypertension (Primary)  -     Comprehensive Metabolic Panel; Future  -     Lipid Panel; Future    2. CKD (chronic kidney disease) stage 3, GFR 30-59 ml/min (CMS/[facility])    3. Recurrent major depressive disorder, in full remission (CMS/[facility])    4. Overactive bladder  -     oxybutynin XL (DITROPAN-XL) 10 MG 24 hr tablet; Take 1 tablet by mouth Daily.  Dispense: 90 tablet; Refill: 3  -     Mirabegron ER (MYRBETRIQ) 50 MG tablet sustained-release 24 hour 24 hr tablet; Take 50 mg by mouth Daily.  Dispense: 28 tablet; Refill: 0    5. Urge incontinence  -     oxybutynin XL (DITROPAN-XL) 10 MG 24 hr tablet; Take 1 tablet by mouth Daily.  Dispense: 90 tablet; Refill: 3  -     Mirabegron ER (MYRBETRIQ) 50 MG tablet sustained-release 24 hour 24 hr tablet; Take 50 mg by mouth Daily.  Dispense: 28 tablet; Refill: 0    6. Impaired glucose tolerance  -     Hemoglobin A1c; Future    7. Class 3 severe obesity due to excess calories with serious comorbidity and body mass index (BMI) of 40.0 to 44.9 in adult (CMS/[facility])    8. Flu vaccine need  -     Fluad Tri 65yr ([id number])    Other orders  -     Cancel: Mirabegron ER (MYRBETRIQ) 50 MG tablet sustained-release 24 hour 24 hr tablet; Take 50 mg by mouth Daily.  Dispense: 30 tablet      Follow Up:  Return in about 6 months (around 6/5/2020) for Recheck.     An After Visit Summary and PPPS were given to the patient.

## 2019-12-05 NOTE — PROGRESS NOTES
"CC: Follow-up hypertension    History:  Sarah Littlejohn is a 69 y.o. female   She notes she has been doing well without any acute illness.  Her blood pressure has been doing well on current medication without side effects, though her dose was decreased by nephrology recently.  She has had good control of depression on Lexapro and denies any uncontrolled symptoms.  She has had no change in urinary quality or quantity on ACE inhibitor for her CKD.  She does have ongoing issues especially nocturnally with her overactive bladder and urge incontinence.  She is on Ditropan.    ROS:  Review of Systems   Constitutional: Negative for chills and fever.   Respiratory: Negative for cough and shortness of breath.    Cardiovascular: Negative for chest pain and palpitations.   Genitourinary: Positive for urgency. Negative for difficulty urinating and dysuria.   Neurological: Negative for dizziness and syncope.        reports that she has never smoked. She has never used smokeless tobacco. She reports that she does not drink alcohol or use drugs.      Current Outpatient Medications:   •  escitalopram (LEXAPRO) 10 MG tablet, Take 1 tablet by mouth Daily., Disp: 90 tablet, Rfl: 3  •  lisinopril-hydrochlorothiazide (PRINZIDE,ZESTORETIC) 20-12.5 MG per tablet, Take 1 tablet by mouth Daily., Disp: , Rfl:   •  oxybutynin XL (DITROPAN-XL) 10 MG 24 hr tablet, Take 1 tablet by mouth Daily., Disp: 90 tablet, Rfl: 3    OBJECTIVE:  /78 (BP Location: Left arm, Patient Position: Sitting, Cuff Size: Adult)   Pulse 65   Resp 16   Ht 160 cm (63\")   Wt 111 kg (244 lb 12.8 oz)   SpO2 98%   Breastfeeding? No   BMI 43.36 kg/m²    Physical Exam   Constitutional: She is oriented to person, place, and time. She appears well-nourished. No distress.   Cardiovascular: Normal rate, regular rhythm and normal heart sounds.   No murmur heard.  Pulmonary/Chest: Effort normal and breath sounds normal. She has no wheezes.   Neurological: She is " alert and oriented to person, place, and time.   Psychiatric: She has a normal mood and affect.       Assessment/Plan    Diagnoses and all orders for this visit:    Essential hypertension  -     Comprehensive Metabolic Panel; Future  -     Lipid Panel; Future  Well controlled, BP goal for age is <140/90 per JNC 8 guidelines and continue current medications    CKD (chronic kidney disease) stage 3, GFR 30-59 ml/min (CMS/[facility])  Stable on RAAS blockade without change in urinary quality or quantity.  She follows with nephrology.    Recurrent major depressive disorder, in full remission (CMS/[facility])  Well-controlled on Lexapro without side effects.    Overactive bladder  Urge incontinence  -     oxybutynin XL (DITROPAN-XL) 10 MG 24 hr tablet; Take 1 tablet by mouth Daily.  -     Mirabegron ER (MYRBETRIQ) 50 MG tablet sustained-release 24 hour 24 hr tablet; Take 50 mg by mouth Daily.  She may continue Ditropan and we will try a trial of Myrbetriq to see if this improves her symptoms.  If so, she could consider monotherapy with Myrbetriq, or dual therapy as needed.    Impaired glucose tolerance  -     Hemoglobin A1c; Future  A1c to monitor.  She has no symptoms of hyperglycemia at this time.    Class 3 severe obesity due to excess calories with serious comorbidity and body mass index (BMI) of 40.0 to 44.9 in adult (CMS/[facility])  Recommended attention to portion control and being careful about the types and timing of meals for the purpose of weight management.    Flu vaccine need  -     Fluad Tri 65yr ([id number])    An After Visit Summary was printed and given to the patient at discharge.  Return in about 6 months (around 6/5/2020) for Recheck.         Michael Singleton D.O. 12/5/2019   Electronically signed.

## 2019-12-05 NOTE — PATIENT INSTRUCTIONS
Medicare Wellness  Personal Prevention Plan of Service     Date of Office Visit:  2019  Encounter Provider:  Michael Singleton DO  Place of Service:  [facility]  Patient Name: Sarah Littlejohn  :  1950    As part of the Medicare Wellness portion of your visit today, we are providing you with this personalized preventive plan of services (PPPS). This plan is based upon recommendations of the [place] Preventive Services Task Force (USPSTF) and the Advisory Committee on Immunization Practices (ACIP).    This lists the preventive care services that should be considered, and provides dates of when you are due. Items listed as completed are up-to-date and do not require any further intervention.    Health Maintenance   Topic Date Due   • TDAP/TD VACCINES (1 - Tdap) 02/10/1970   • ZOSTER VACCINE (1 of 2) 02/10/2001   • INFLUENZA VACCINE  2019   • DXA SCAN  2019   • MEDICARE ANNUAL WELLNESS  2020   • MAMMOGRAM  2021   • COLONOSCOPY  2026   • HEPATITIS C SCREENING  Completed   • PNEUMOCOCCAL VACCINES (65+ LOW/MEDIUM RISK)  Completed   • HEMOGLOBIN A1C  Discontinued       Orders Placed This Encounter   Procedures   • Fluad Tri 65yr ([id number])   • Comprehensive Metabolic Panel     Standing Status:   Future     Standing Expiration Date:   2020   • Hemoglobin A1c     Standing Status:   Future     Standing Expiration Date:   2020   • Lipid Panel     Standing Status:   Future     Standing Expiration Date:   2020       Return in about 6 months (around 2020) for Recheck.

## 2020-02-27 NOTE — PROGRESS NOTES
immunoelectrophoresis and immunofixation returned on 12/19/2016 revealed no monoclonal protein    BONE SURVEY AT [facility] ON 12/15/2016 was negative for any lytic lesions    Bone marrow aspiration and biopsy performed in [address] [place] [address] on 1/23/2017   · Normocellular (40-50%) with trilineage hematopoiesis and no evidence of dysplasia nor increase in blasts. · There were adequate iron stores. · There was no diagnostic evidence of plasma cell dyscrasia nor lymphoproliferative disorder. · Molecular testing for B-cell gene rearrangements were negative. · Cytogenetics were normal female karyotype. CTs of the soft tissue neck, chest, abdomen was performed on 2/9/2017 at [facility] which were unrevealing for any lymphadenopathy or suspicious lesions for lymphoplasmacytic lymphoma. There were 2 lesions noted in the right kidney that were most likely cyst with an ultrasound of the kidney performed on 2/16/2017 revealing these to be simple cysts. With CT scans being unrevealing her final diagnosis is that of an IgM MGUS. She will be followed up in the office for physical examinations and serologic testing periodically. With stable serology, frequency a followup will be decreased. TUMOR HISTORY: 7 mm RML nodule  Baseline CT scans without IV contrast were performed at [facility] for evaluation of her monoclonal gammopathy. The CT of the chest without contrast on 2/9/2017 revealed a 7 mm nodule in the RML. Follow-up CT of the chest without contrast was performed at [facility] on 5/25/2017 and compared to 2/9/2017. There was a 7 mm nodular density in the RML which appears to be an area of fibrosis. The 4 mm nodule in the RLL is stable and recommendations is to follow-up again in 6 months. CT of the chest on 12/4/2017 and compared to the prior study of 2/25/2017 which revealed a stable 5 mm groundglass nodule in the RLL. This had not changed since May 2017 with minimal increase in size since February 2008.  Based on the size of the nodule and the Fleischner Society recommendations no further repeat CT scanning is required. Past Medical History:   Diagnosis Date    Anxiety     Depression     Diabetes mellitus ([address]     Hypertension     Hyperuricemia     Kidney failure     Renal cyst, right     Seasonal allergies         Past Surgical History:   Procedure Laterality Date    BONE MARROW BIOPSY N/A 1/23/2017    BONE MARROW ASPIRATION BIOPSY performed by Soni Michael MD at [address] [place] [address] CATARACT REMOVAL Bilateral 2016    [facility]    KNEE CARTILAGE SURGERY  2000           Current Outpatient Medications:     lisinopril (PRINIVIL;ZESTRIL) 20 MG tablet, Take 20 mg by mouth daily, Disp: , Rfl:     oxybutynin (DITROPAN) 5 MG tablet, Take 5 mg by mouth daily, Disp: , Rfl:     Multiple Vitamins-Minerals (THERAPEUTIC MULTIVITAMIN-MINERALS) tablet, Take 1 tablet by mouth daily, Disp: , Rfl:      Allergies   Allergen Reactions    Neosporin [Neomycin-Polymyxin-Gramicidin] Dermatitis    Neomycin-Bacitracin Zn-Polymyx Rash    Penicillins Rash       Social History     Tobacco Use    Smoking status: Never Smoker    Smokeless tobacco: Never Used   Substance Use Topics    Alcohol use: Not on file    Drug use: Not on file       Family History   Problem Relation Age of Onset    Other Mother         Lymphoma    Cancer Father         Leukemia       Subjective   REVIEW OF SYSTEMS:   Review of Systems   Constitutional: Negative for chills, diaphoresis, fatigue, fever and unexpected weight change. HENT: Negative for mouth sores, nosebleeds, sore throat, trouble swallowing and voice change. Eyes: Negative for photophobia, discharge and itching. Respiratory: Negative for cough, shortness of breath and wheezing. Cardiovascular: Negative for chest pain, palpitations and leg swelling.    Gastrointestinal: Negative for abdominal distention, abdominal pain, blood in stool, constipation, diarrhea, nausea and vomiting. Endocrine: Negative for cold intolerance, heat intolerance, polydipsia and polyuria. Genitourinary: Negative for difficulty urinating, dysuria, hematuria and urgency. Musculoskeletal: Positive for arthralgias. Negative for back pain, joint swelling and myalgias. Skin: Negative for color change and rash. Allergic/Immunologic: Positive for environmental allergies. Neurological: Negative for dizziness, tremors, seizures, syncope and light-headedness. Hematological: Negative for adenopathy. Does not bruise/bleed easily. Psychiatric/Behavioral: Negative for behavioral problems and suicidal ideas. The patient is not nervous/anxious. Objective   /74   Pulse 82   Ht 5' 3\" (1.6 m)   Wt 250 lb 12.8 oz (113.8 kg)   SpO2 98%   BMI 44.43 kg/m²     PHYSICAL EXAM:  Physical Exam  Constitutional:       Appearance: She is well-developed. She is obese. HENT:      Head: Normocephalic and atraumatic. Eyes:      General: No scleral icterus. Conjunctiva/sclera: Conjunctivae normal.   Neck:      Musculoskeletal: Normal range of motion and neck supple. Trachea: No tracheal deviation. Cardiovascular:      Rate and Rhythm: Normal rate and regular rhythm. Heart sounds: Normal heart sounds. No murmur. Pulmonary:      Effort: Pulmonary effort is normal. No respiratory distress. Breath sounds: Normal breath sounds. Abdominal:      General: Bowel sounds are normal. There is no distension. Palpations: Abdomen is soft. Tenderness: There is no abdominal tenderness. Musculoskeletal:         General: No tenderness. Comments: Full ROM in all 4 extremities   Skin:     General: Skin is warm and dry. Findings: No rash. Neurological:      Mental Status: She is alert and oriented to person, place, and time. Coordination: Coordination normal.   Psychiatric:         Behavior: Behavior normal.         Thought Content:  Thought content normal.

## 2020-02-28 ENCOUNTER — TRANSCRIBE ORDERS (OUTPATIENT)
Dept: ADMINISTRATIVE | Facility: HOSPITAL | Age: 70
End: 2020-02-28

## 2020-02-28 ENCOUNTER — HOSPITAL ENCOUNTER (OUTPATIENT)
Dept: INFUSION THERAPY | Age: 70
Discharge: HOME | End: 2020-02-28
Payer: MEDICARE

## 2020-02-28 ENCOUNTER — OFFICE VISIT (OUTPATIENT)
Dept: HEMATOLOGY | Age: 70
End: 2020-02-28
Payer: MEDICARE

## 2020-02-28 VITALS
DIASTOLIC BLOOD PRESSURE: 74 MMHG | OXYGEN SATURATION: 98 % | HEART RATE: 82 BPM | HEIGHT: 63 IN | WEIGHT: 250.8 LBS | SYSTOLIC BLOOD PRESSURE: 124 MMHG | BODY MASS INDEX: 44.44 KG/M2

## 2020-02-28 VITALS
SYSTOLIC BLOOD PRESSURE: 124 MMHG | WEIGHT: 244 LBS | HEART RATE: 54 BPM | DIASTOLIC BLOOD PRESSURE: 64 MMHG | OXYGEN SATURATION: 96 % | BODY MASS INDEX: 43.22 KG/M2

## 2020-02-28 DIAGNOSIS — D47.2 MGUS (MONOCLONAL GAMMOPATHY OF UNKNOWN SIGNIFICANCE): ICD-10-CM

## 2020-02-28 DIAGNOSIS — D72.10 EOSINOPHILIA: ICD-10-CM

## 2020-02-28 DIAGNOSIS — D75.89 MACROCYTOSIS: ICD-10-CM

## 2020-02-28 DIAGNOSIS — D47.2 MONOCLONAL GAMMOPATHY: ICD-10-CM

## 2020-02-28 DIAGNOSIS — Z12.31 OTHER SCREENING MAMMOGRAM: Primary | ICD-10-CM

## 2020-02-28 PROCEDURE — G8427 DOCREV CUR MEDS BY ELIG CLIN: HCPCS | Performed by: PHYSICIAN ASSISTANT

## 2020-02-28 PROCEDURE — 4040F PNEUMOC VAC/ADMIN/RCVD: CPT | Performed by: PHYSICIAN ASSISTANT

## 2020-02-28 PROCEDURE — 1090F PRES/ABSN URINE INCON ASSESS: CPT | Performed by: PHYSICIAN ASSISTANT

## 2020-02-28 PROCEDURE — 1036F TOBACCO NON-USER: CPT | Performed by: PHYSICIAN ASSISTANT

## 2020-02-28 PROCEDURE — G8417 CALC BMI ABV UP PARAM F/U: HCPCS | Performed by: PHYSICIAN ASSISTANT

## 2020-02-28 PROCEDURE — 99213 OFFICE O/P EST LOW 20 MIN: CPT | Performed by: PHYSICIAN ASSISTANT

## 2020-02-28 PROCEDURE — 99212 OFFICE O/P EST SF 10 MIN: CPT

## 2020-02-28 PROCEDURE — 85025 COMPLETE CBC W/AUTO DIFF WBC: CPT

## 2020-02-28 PROCEDURE — 36415 COLL VENOUS BLD VENIPUNCTURE: CPT

## 2020-02-28 PROCEDURE — 80053 COMPREHEN METABOLIC PANEL: CPT

## 2020-02-28 PROCEDURE — 3017F COLORECTAL CA SCREEN DOC REV: CPT | Performed by: PHYSICIAN ASSISTANT

## 2020-02-28 PROCEDURE — 1123F ACP DISCUSS/DSCN MKR DOCD: CPT | Performed by: PHYSICIAN ASSISTANT

## 2020-02-28 PROCEDURE — G8400 PT W/DXA NO RESULTS DOC: HCPCS | Performed by: PHYSICIAN ASSISTANT

## 2020-02-28 PROCEDURE — G8484 FLU IMMUNIZE NO ADMIN: HCPCS | Performed by: PHYSICIAN ASSISTANT

## 2020-02-28 ASSESSMENT — ENCOUNTER SYMPTOMS
BACK PAIN: 0
DIARRHEA: 0
SHORTNESS OF BREATH: 0
VOMITING: 0
COUGH: 0
CONSTIPATION: 0
NAUSEA: 0
SORE THROAT: 0
TROUBLE SWALLOWING: 0
COLOR CHANGE: 0
WHEEZING: 0
EYE ITCHING: 0
BLOOD IN STOOL: 0
PHOTOPHOBIA: 0
VOICE CHANGE: 0
ABDOMINAL PAIN: 0
ABDOMINAL DISTENTION: 0
EYE DISCHARGE: 0

## 2020-03-06 ENCOUNTER — HOSPITAL ENCOUNTER (OUTPATIENT)
Dept: MAMMOGRAPHY | Facility: HOSPITAL | Age: 70
Discharge: HOME | End: 2020-03-06
Admitting: INTERNAL MEDICINE

## 2020-03-06 DIAGNOSIS — Z12.31 OTHER SCREENING MAMMOGRAM: ICD-10-CM

## 2020-03-06 PROCEDURE — 77063 BREAST TOMOSYNTHESIS BI: CPT

## 2020-03-06 PROCEDURE — 77067 SCR MAMMO BI INCL CAD: CPT

## 2020-05-11 DIAGNOSIS — N32.81 OVERACTIVE BLADDER: ICD-10-CM

## 2020-05-11 DIAGNOSIS — N39.41 URGE INCONTINENCE: ICD-10-CM

## 2020-05-11 RX ORDER — OXYBUTYNIN CHLORIDE 10 MG/1
10 TABLET, EXTENDED RELEASE ORAL DAILY
Qty: 90 TABLET | Refills: 1 | Status: SHIPPED | OUTPATIENT
Start: 2020-05-11 | End: 2021-03-26

## 2020-06-05 ENCOUNTER — OFFICE VISIT (OUTPATIENT)
Dept: INTERNAL MEDICINE | Facility: CLINIC | Age: 70
End: 2020-06-05

## 2020-06-05 VITALS
TEMPERATURE: 97.9 F | SYSTOLIC BLOOD PRESSURE: 136 MMHG | HEART RATE: 60 BPM | DIASTOLIC BLOOD PRESSURE: 74 MMHG | OXYGEN SATURATION: 98 % | RESPIRATION RATE: 16 BRPM | WEIGHT: 263.5 LBS | BODY MASS INDEX: 46.69 KG/M2 | HEIGHT: 63 IN

## 2020-06-05 DIAGNOSIS — Z78.0 ENCOUNTER FOR OSTEOPOROSIS SCREENING IN ASYMPTOMATIC POSTMENOPAUSAL PATIENT: ICD-10-CM

## 2020-06-05 DIAGNOSIS — E66.01 CLASS 3 SEVERE OBESITY DUE TO EXCESS CALORIES WITH SERIOUS COMORBIDITY AND BODY MASS INDEX (BMI) OF 45.0 TO 49.9 IN ADULT (HCC): ICD-10-CM

## 2020-06-05 DIAGNOSIS — Z13.820 ENCOUNTER FOR OSTEOPOROSIS SCREENING IN ASYMPTOMATIC POSTMENOPAUSAL PATIENT: ICD-10-CM

## 2020-06-05 DIAGNOSIS — R73.02 IMPAIRED GLUCOSE TOLERANCE: ICD-10-CM

## 2020-06-05 DIAGNOSIS — N18.30 CKD (CHRONIC KIDNEY DISEASE) STAGE 3, GFR 30-59 ML/MIN (HCC): ICD-10-CM

## 2020-06-05 DIAGNOSIS — N39.41 URGE INCONTINENCE: ICD-10-CM

## 2020-06-05 DIAGNOSIS — I10 ESSENTIAL HYPERTENSION: Primary | ICD-10-CM

## 2020-06-05 DIAGNOSIS — F51.01 PRIMARY INSOMNIA: ICD-10-CM

## 2020-06-05 DIAGNOSIS — F33.42 RECURRENT MAJOR DEPRESSIVE DISORDER, IN FULL REMISSION (HCC): ICD-10-CM

## 2020-06-05 PROCEDURE — 99214 OFFICE O/P EST MOD 30 MIN: CPT | Performed by: INTERNAL MEDICINE

## 2020-06-05 RX ORDER — CHOLECALCIFEROL (VITAMIN D3) 125 MCG
2.5-1 CAPSULE ORAL NIGHTLY PRN
Qty: 60 EACH | Refills: 1 | Status: SHIPPED | OUTPATIENT
Start: 2020-06-05 | End: 2022-06-10

## 2020-06-05 NOTE — PROGRESS NOTES
"CC: sleep issues    History:  Sarah Littlejohn is a 70 y.o. female   She notes she has been having falling asleep. She typically spend her last hour before bed on the computer and has not tried anything to help with falling asleep to date. Once she falls asleep, she does not reawaken or have unrestful sleep.    Her mood has done well with regard to depression and she does continue on Lexapro without side effects.     She stopped taking Myrbetriq because she thought it might be the reason for her sleeping trouble. She does continue on oxybutynin, but still does desire better control of urge incontinence.        ROS:  Review of Systems   Constitutional: Negative for chills and fever.   Respiratory: Negative for cough and shortness of breath.    Cardiovascular: Negative for chest pain and palpitations.   Genitourinary: Positive for urgency. Negative for difficulty urinating and dysuria.   Psychiatric/Behavioral: Positive for sleep disturbance. Negative for dysphoric mood. The patient is not nervous/anxious.         reports that she has never smoked. She has never used smokeless tobacco. She reports that she does not drink alcohol or use drugs.      Current Outpatient Medications:   •  escitalopram (LEXAPRO) 10 MG tablet, Take 1 tablet by mouth Daily., Disp: 90 tablet, Rfl: 3  •  lisinopril-hydrochlorothiazide (PRINZIDE,ZESTORETIC) 20-12.5 MG per tablet, Take 1 tablet by mouth Daily., Disp: , Rfl:   •  Mirabegron ER (MYRBETRIQ) 50 MG tablet sustained-release 24 hour 24 hr tablet, Take 50 mg by mouth Daily., Disp: 28 tablet, Rfl: 0  •  oxybutynin XL (DITROPAN-XL) 10 MG 24 hr tablet, Take 1 tablet by mouth Daily., Disp: 90 tablet, Rfl: 1    OBJECTIVE:  /74 (BP Location: Left arm, Patient Position: Sitting, Cuff Size: Adult)   Pulse 60   Temp 97.9 °F (36.6 °C)   Resp 16   Ht 160 cm (63\")   Wt 120 kg (263 lb 8 oz)   SpO2 98%   Breastfeeding No   BMI 46.68 kg/m²    Physical Exam   Constitutional: She is " oriented to person, place, and time. She appears well-nourished. No distress.   Cardiovascular: Normal rate, regular rhythm and normal heart sounds.   No murmur heard.  Pulmonary/Chest: Effort normal and breath sounds normal. She has no wheezes.   Neurological: She is alert and oriented to person, place, and time.   Psychiatric: She has a normal mood and affect.       Assessment/Plan     Diagnoses and all orders for this visit:    Essential hypertension  -     Lipid Panel; Future  Well controlled, BP goal for age is <140/90 per JNC 8 guidelines and continue current medications    Urge incontinence  She may restart Myrbetriq with oxybutynin as this is unlikely to be worsening insomnia.     CKD (chronic kidney disease) stage 3, GFR 30-59 ml/min (CMS/[facility])  Stable on RAAS blockade.     Class 3 severe obesity due to excess calories with serious comorbidity and body mass index (BMI) of 45.0 to 49.9 in adult (CMS/[facility])  Recommended attention to portion control and being careful about the types and timing of meals for the purpose of weight management.    Impaired glucose tolerance  -     Hemoglobin A1c; Future  -     Lipid Panel; Future  Labs to be done with her next draw.     Recurrent major depressive disorder, in full remission (CMS/[facility])  Well controlled on Lexapro.     Primary insomnia  -     melatonin 5 MG tablet tablet; Take 0.5-2 tablets by mouth At Night As Needed (sleep).  Try melatonin for assistance with sleep and we also discussed sleep hygiene to include avoiding screen time 60 minutes before bed.     Encounter for osteoporosis screening in asymptomatic postmenopausal patient  -     DEXA Bone Density Axial; Future        An After Visit Summary was printed and given to the patient at discharge.  Return in about 6 months (around 12/5/2020) for Medicare Wellness.          Michael Singleton D.O. 6/5/2020   Electronically signed.

## 2020-06-24 ENCOUNTER — HOSPITAL ENCOUNTER (OUTPATIENT)
Dept: BONE DENSITY | Facility: HOSPITAL | Age: 70
Discharge: HOME | End: 2020-06-24
Admitting: INTERNAL MEDICINE

## 2020-06-24 DIAGNOSIS — Z78.0 ENCOUNTER FOR OSTEOPOROSIS SCREENING IN ASYMPTOMATIC POSTMENOPAUSAL PATIENT: ICD-10-CM

## 2020-06-24 DIAGNOSIS — Z13.820 ENCOUNTER FOR OSTEOPOROSIS SCREENING IN ASYMPTOMATIC POSTMENOPAUSAL PATIENT: ICD-10-CM

## 2020-06-24 PROCEDURE — 77080 DXA BONE DENSITY AXIAL: CPT

## 2020-06-25 ENCOUNTER — APPOINTMENT (OUTPATIENT)
Dept: BONE DENSITY | Facility: HOSPITAL | Age: 70
End: 2020-06-25

## 2020-06-25 NOTE — PROGRESS NOTES
 Diabetes mellitus ([facility] [address]     Hypertension     Hyperuricemia     Kidney failure     Renal cyst, right     Seasonal allergies         Past Surgical History:   Procedure Laterality Date    BONE MARROW BIOPSY N/A 1/23/2017    BONE MARROW ASPIRATION BIOPSY performed by Mariangel Bryan MD at [address] [place] [address] CATARACT REMOVAL Bilateral 2016    [facility]           Current Outpatient Medications:     escitalopram (LEXAPRO) 10 MG tablet, Take 10 mg by mouth daily, Disp: , Rfl:     melatonin 5 MG TABS tablet, Take 2.5-10 mg by mouth nightly as needed, Disp: , Rfl:     lisinopril (PRINIVIL;ZESTRIL) 20 MG tablet, Take 20 mg by mouth daily, Disp: , Rfl:     oxybutynin (DITROPAN) 5 MG tablet, Take 5 mg by mouth daily, Disp: , Rfl:     Multiple Vitamins-Minerals (THERAPEUTIC MULTIVITAMIN-MINERALS) tablet, Take 1 tablet by mouth daily, Disp: , Rfl:      Allergies   Allergen Reactions    Neosporin [Neomycin-Polymyxin-Gramicidin] Dermatitis    Neomycin-Bacitracin Zn-Polymyx Rash    Penicillins Rash       Social History     Tobacco Use    Smoking status: Never Smoker    Smokeless tobacco: Never Used   Substance Use Topics    Alcohol use: Not on file    Drug use: Not on file       Family History   Problem Relation Age of Onset    Other Mother         Lymphoma    Cancer Father         Leukemia       Subjective   REVIEW OF SYSTEMS:   Review of Systems   Constitutional: Negative for chills, diaphoresis, fatigue, fever and unexpected weight change. HENT: Negative for mouth sores, nosebleeds, sore throat, trouble swallowing and voice change. Eyes: Negative for photophobia, discharge and itching. Respiratory: Negative for cough, shortness of breath and wheezing. Cardiovascular: Negative for chest pain, palpitations and leg swelling.    Gastrointestinal: Negative for abdominal distention, abdominal pain, blood in stool, constipation, B12    Folate        Return in about 6 months (around 12/26/2020) for With Abram Craig.      Priscilla Kearney PA-C  9:54 AM  6/26/2020

## 2020-06-26 ENCOUNTER — OFFICE VISIT (OUTPATIENT)
Dept: HEMATOLOGY | Age: 70
End: 2020-06-26
Payer: MEDICARE

## 2020-06-26 ENCOUNTER — HOSPITAL ENCOUNTER (OUTPATIENT)
Dept: INFUSION THERAPY | Age: 70
Discharge: HOME | End: 2020-06-26
Payer: MEDICARE

## 2020-06-26 VITALS
HEIGHT: 63 IN | SYSTOLIC BLOOD PRESSURE: 120 MMHG | HEART RATE: 50 BPM | TEMPERATURE: 97.9 F | WEIGHT: 253.4 LBS | DIASTOLIC BLOOD PRESSURE: 60 MMHG | BODY MASS INDEX: 44.9 KG/M2 | OXYGEN SATURATION: 99 %

## 2020-06-26 DIAGNOSIS — D47.2 MGUS (MONOCLONAL GAMMOPATHY OF UNKNOWN SIGNIFICANCE): ICD-10-CM

## 2020-06-26 DIAGNOSIS — D75.89 MACROCYTOSIS: ICD-10-CM

## 2020-06-26 LAB
ALBUMIN SERPL-MCNC: 4.2 G/DL (ref 3.5–5.2)
ALP BLD-CCNC: 92 U/L (ref 35–104)
ALT SERPL-CCNC: 18 U/L (ref 9–52)
ANION GAP SERPL CALCULATED.3IONS-SCNC: 9 MMOL/L (ref 7–19)
AST SERPL-CCNC: 27 U/L (ref 14–36)
BASOPHILS ABSOLUTE: 0.01 K/UL (ref 0.01–0.08)
BASOPHILS RELATIVE PERCENT: 0.1 % (ref 0.1–1.2)
BILIRUB SERPL-MCNC: 0.7 MG/DL (ref 0.2–1.3)
BUN BLDV-MCNC: 37 MG/DL (ref 7–17)
CALCIUM SERPL-MCNC: 9.4 MG/DL (ref 8.4–10.2)
CHLORIDE BLD-SCNC: 107 MMOL/L (ref 98–111)
CO2: 26 MMOL/L (ref 22–29)
CREAT SERPL-MCNC: 1.6 MG/DL (ref 0.5–1)
EOSINOPHILS ABSOLUTE: 0.64 K/UL (ref 0.04–0.54)
EOSINOPHILS RELATIVE PERCENT: 8 % (ref 0.7–7)
FOLATE: 24.1 NG/ML (ref 2.7–20)
GFR NON-AFRICAN AMERICAN: 32
GLOBULIN: 3.2 G/DL
GLUCOSE BLD-MCNC: 99 MG/DL (ref 74–106)
HCT VFR BLD CALC: 40.5 % (ref 34.1–44.9)
HEMOGLOBIN: 13 G/DL (ref 11.2–15.7)
LYMPHOCYTES ABSOLUTE: 2.01 K/UL (ref 1.18–3.74)
LYMPHOCYTES RELATIVE PERCENT: 25.1 % (ref 19.3–53.1)
MCH RBC QN AUTO: 32.5 PG (ref 25.6–32.2)
MCHC RBC AUTO-ENTMCNC: 32.1 G/DL (ref 32.3–35.5)
MCV RBC AUTO: 101.3 FL (ref 79.4–94.8)
MONOCYTES ABSOLUTE: 0.89 K/UL (ref 0.24–0.82)
MONOCYTES RELATIVE PERCENT: 11.1 % (ref 4.7–12.5)
NEUTROPHILS ABSOLUTE: 4.45 K/UL (ref 1.56–6.13)
NEUTROPHILS RELATIVE PERCENT: 55.7 % (ref 34–71.1)
PDW BLD-RTO: 11.8 % (ref 11.7–14.4)
PLATELET # BLD: 168 K/UL (ref 182–369)
PMV BLD AUTO: 10.4 FL (ref 7.4–10.4)
POTASSIUM SERPL-SCNC: 4.5 MMOL/L (ref 3.5–5.1)
RBC # BLD: 4 M/UL (ref 3.93–5.22)
SODIUM BLD-SCNC: 142 MMOL/L (ref 137–145)
TOTAL PROTEIN: 7.4 G/DL (ref 6.3–8.2)
VITAMIN B-12: 540 PG/ML (ref 239–931)
WBC # BLD: 8 K/UL (ref 3.98–10.04)

## 2020-06-26 PROCEDURE — 1123F ACP DISCUSS/DSCN MKR DOCD: CPT | Performed by: PHYSICIAN ASSISTANT

## 2020-06-26 PROCEDURE — 82746 ASSAY OF FOLIC ACID SERUM: CPT

## 2020-06-26 PROCEDURE — G8427 DOCREV CUR MEDS BY ELIG CLIN: HCPCS | Performed by: PHYSICIAN ASSISTANT

## 2020-06-26 PROCEDURE — 4040F PNEUMOC VAC/ADMIN/RCVD: CPT | Performed by: PHYSICIAN ASSISTANT

## 2020-06-26 PROCEDURE — 85025 COMPLETE CBC W/AUTO DIFF WBC: CPT

## 2020-06-26 PROCEDURE — G8417 CALC BMI ABV UP PARAM F/U: HCPCS | Performed by: PHYSICIAN ASSISTANT

## 2020-06-26 PROCEDURE — 99213 OFFICE O/P EST LOW 20 MIN: CPT | Performed by: PHYSICIAN ASSISTANT

## 2020-06-26 PROCEDURE — 1090F PRES/ABSN URINE INCON ASSESS: CPT | Performed by: PHYSICIAN ASSISTANT

## 2020-06-26 PROCEDURE — G8400 PT W/DXA NO RESULTS DOC: HCPCS | Performed by: PHYSICIAN ASSISTANT

## 2020-06-26 PROCEDURE — 82607 VITAMIN B-12: CPT

## 2020-06-26 PROCEDURE — 1036F TOBACCO NON-USER: CPT | Performed by: PHYSICIAN ASSISTANT

## 2020-06-26 PROCEDURE — 99211 OFF/OP EST MAY X REQ PHY/QHP: CPT

## 2020-06-26 PROCEDURE — 80053 COMPREHEN METABOLIC PANEL: CPT

## 2020-06-26 PROCEDURE — 3017F COLORECTAL CA SCREEN DOC REV: CPT | Performed by: PHYSICIAN ASSISTANT

## 2020-06-26 RX ORDER — ESCITALOPRAM OXALATE 10 MG/1
10 TABLET ORAL DAILY
COMMUNITY
Start: 2019-11-04

## 2020-06-26 RX ORDER — CHOLECALCIFEROL (VITAMIN D3) 125 MCG
2.5-1 CAPSULE ORAL NIGHTLY PRN
COMMUNITY
Start: 2020-06-05

## 2020-06-26 ASSESSMENT — ENCOUNTER SYMPTOMS
PHOTOPHOBIA: 0
COUGH: 0
ABDOMINAL PAIN: 0
SORE THROAT: 0
ABDOMINAL DISTENTION: 0
VOMITING: 0
CONSTIPATION: 0
BLOOD IN STOOL: 0
SHORTNESS OF BREATH: 0
VOICE CHANGE: 0
EYE DISCHARGE: 0
EYE ITCHING: 0
DIARRHEA: 0
NAUSEA: 0
TROUBLE SWALLOWING: 0
BACK PAIN: 0
COLOR CHANGE: 0
WHEEZING: 0

## 2020-09-10 ENCOUNTER — TELEPHONE (OUTPATIENT)
Dept: INTERNAL MEDICINE | Facility: CLINIC | Age: 70
End: 2020-09-10

## 2020-09-10 NOTE — TELEPHONE ENCOUNTER
PT NEEDS REFILL ON OXYBUTYNIN BUT IT HAS GONE JUAN CARLOS HIGH SO SHE IS WANTING TO KNOW IF THERE IS SOMETHING COMPARABLE THAT WILL WORK? WOULD LIKE SENT TO [facility].

## 2020-09-10 NOTE — TELEPHONE ENCOUNTER
PATIENT HAS BEEN CALLED, GIVEN MESSAGE AND SHE STATED THAT SHE WOULD GET A COUPON OFF THE INTERNET.

## 2020-11-02 RX ORDER — LISINOPRIL AND HYDROCHLOROTHIAZIDE 20; 12.5 MG/1; MG/1
1 TABLET ORAL 2 TIMES DAILY
Qty: 180 TABLET | Refills: 0 | Status: SHIPPED | OUTPATIENT
Start: 2020-11-02 | End: 2021-01-25

## 2020-12-07 ENCOUNTER — OFFICE VISIT (OUTPATIENT)
Dept: INTERNAL MEDICINE | Facility: CLINIC | Age: 70
End: 2020-12-07

## 2020-12-07 VITALS
BODY MASS INDEX: 47.13 KG/M2 | WEIGHT: 266 LBS | HEIGHT: 63 IN | TEMPERATURE: 97.8 F | HEART RATE: 60 BPM | RESPIRATION RATE: 16 BRPM | DIASTOLIC BLOOD PRESSURE: 66 MMHG | OXYGEN SATURATION: 98 % | SYSTOLIC BLOOD PRESSURE: 142 MMHG

## 2020-12-07 DIAGNOSIS — R73.02 IMPAIRED GLUCOSE TOLERANCE: ICD-10-CM

## 2020-12-07 DIAGNOSIS — N18.30 STAGE 3 CHRONIC KIDNEY DISEASE, UNSPECIFIED WHETHER STAGE 3A OR 3B CKD (HCC): ICD-10-CM

## 2020-12-07 DIAGNOSIS — Z23 NEED FOR VACCINATION: ICD-10-CM

## 2020-12-07 DIAGNOSIS — M25.512 ACUTE PAIN OF LEFT SHOULDER: ICD-10-CM

## 2020-12-07 DIAGNOSIS — Z00.00 MEDICARE ANNUAL WELLNESS VISIT, SUBSEQUENT: Primary | ICD-10-CM

## 2020-12-07 DIAGNOSIS — I10 ESSENTIAL HYPERTENSION: ICD-10-CM

## 2020-12-07 DIAGNOSIS — F33.42 RECURRENT MAJOR DEPRESSIVE DISORDER, IN FULL REMISSION (HCC): ICD-10-CM

## 2020-12-07 DIAGNOSIS — E66.01 CLASS 3 SEVERE OBESITY DUE TO EXCESS CALORIES WITH SERIOUS COMORBIDITY AND BODY MASS INDEX (BMI) OF 45.0 TO 49.9 IN ADULT (HCC): ICD-10-CM

## 2020-12-07 PROCEDURE — 99214 OFFICE O/P EST MOD 30 MIN: CPT | Performed by: NURSE PRACTITIONER

## 2020-12-07 PROCEDURE — 90694 VACC AIIV4 NO PRSRV 0.5ML IM: CPT | Performed by: NURSE PRACTITIONER

## 2020-12-07 PROCEDURE — G0008 ADMIN INFLUENZA VIRUS VAC: HCPCS | Performed by: NURSE PRACTITIONER

## 2020-12-07 PROCEDURE — G0439 PPPS, SUBSEQ VISIT: HCPCS | Performed by: NURSE PRACTITIONER

## 2020-12-07 RX ORDER — ALLOPURINOL 100 MG/1
100 TABLET ORAL DAILY
COMMUNITY
Start: 2020-09-25

## 2020-12-07 NOTE — PATIENT INSTRUCTIONS
Medicare Wellness  Personal Prevention Plan of Service     Date of Office Visit:  2020  Encounter Provider:  ANY Dsouza  Place of Service:  [facility]  Patient Name: Sarah Littlejohn  :  1950    As part of the Medicare Wellness portion of your visit today, we are providing you with this personalized preventive plan of services (PPPS). This plan is based upon recommendations of the [place] Preventive Services Task Force (USPSTF) and the Advisory Committee on Immunization Practices (ACIP).    This lists the preventive care services that should be considered, and provides dates of when you are due. Items listed as completed are up-to-date and do not require any further intervention.    Health Maintenance   Topic Date Due   • TDAP/TD VACCINES (1 - Tdap) 02/10/1970   • ZOSTER VACCINE (1 of 2) 02/10/2001   • INFLUENZA VACCINE  2020   • ANNUAL WELLNESS VISIT  2020   • MAMMOGRAM  2022   • DXA SCAN  2022   • PAP SMEAR  2022   • COLONOSCOPY  2026   • HEPATITIS C SCREENING  Completed   • Pneumococcal Vaccine 65+  Completed   • HEMOGLOBIN A1C  Discontinued       Orders Placed This Encounter   Procedures   • Fluad Quad 65+ yrs (8970-2197)       Return in about 6 months (around 2021).        Shoulder Pain  Many things can cause shoulder pain, including:  · An injury.  · Moving the shoulder in the same way again and again (overuse).  · Joint pain (arthritis).  Pain can come from:  · Swelling and irritation (inflammation) of any part of the shoulder.  · An injury to the shoulder joint.  · An injury to:  ? Tissues that connect muscle to bone (tendons).  ? Tissues that connect bones to each other (ligaments).  ? Bones.  Follow these instructions at home:  Watch for changes in your symptoms. Let your doctor know about them. Follow these instructions to help with your pain.  If you have a sling:  · Wear the sling as told by  your doctor. Remove it only as told by your doctor.  · Loosen the sling if your fingers:  ? Tingle.  ? Become numb.  ? Turn cold and blue.  · Keep the sling clean.  · If the sling is not waterproof:  ? Do not let it get wet.  ? Take the sling off when you shower or bathe.  Managing pain, stiffness, and swelling    · If told, put ice on the painful area:  ? Put ice in a plastic bag.  ? Place a towel between your skin and the bag.  ? Leave the ice on for 20 minutes, 2-3 times a day. Stop putting ice on if it does not help with the pain.  · Squeeze a soft ball or a foam pad as much as possible. This prevents swelling in the shoulder. It also helps to strengthen the arm.  General instructions  · Take over-the-counter and prescription medicines only as told by your doctor.  · Keep all follow-up visits as told by your doctor. This is important.  Contact a doctor if:  · Your pain gets worse.  · Medicine does not help your pain.  · You have new pain in your arm, hand, or fingers.  Get help right away if:  · Your arm, hand, or fingers:  ? Tingle.  ? Are numb.  ? Are swollen.  ? Are painful.  ? Turn white or blue.  Summary  · Shoulder pain can be caused by many things. These include injury, moving the shoulder in the same away again and again, and joint pain.  · Watch for changes in your symptoms. Let your doctor know about them.  · This condition may be treated with a sling, ice, and pain medicine.  · Contact your doctor if the pain gets worse or you have new pain. Get help right away if your arm, hand, or fingers tingle or get numb, swollen, or painful.  · Keep all follow-up visits as told by your doctor. This is important.  This information is not intended to replace advice given to you by your health care provider. Make sure you discuss any questions you have with your health care provider.  Document Revised: 07/02/2019 Document Reviewed: 07/02/2019  Elsevier Patient Education © 2020 [employer]

## 2020-12-07 NOTE — PROGRESS NOTES
CC: medicare wellness, f/u hypertension, left shoulder pain    History:  Sarah Littlejohn is a 70 y.o. female who presents today for follow-up for evaluation of the above:  Patient presents today for medicare wellness. She reports she has been feeling well but does report left shoulder pain for a month. No known injury. Pain occurring daily throughout the day. Sharp pain in shoulder join with shooting pain to her left chest with movements like getting up out of bed. Has not tried anything for this medication wise.  Patient reports compliance with blood pressure medications without adverse side effects.   She is monitoring her b/p at home with readings in the 120/70s.  BMI today is 47  She feels her mood is well controlled at this time.         ROS:  Review of Systems   Constitutional: Negative for fatigue and unexpected weight change.   HENT: Negative.    Eyes: Negative.    Respiratory: Negative.    Cardiovascular: Negative.    Gastrointestinal: Negative for abdominal pain, constipation and diarrhea.   Endocrine: Negative.    Genitourinary: Negative for difficulty urinating, dyspareunia, genital sores, menstrual problem, pelvic pain, vaginal bleeding, vaginal discharge and vaginal pain.   Musculoskeletal: Positive for arthralgias and myalgias.   Skin: Negative.    Neurological: Negative.    Psychiatric/Behavioral: Negative.        Ms. Littlejohn  reports that she has never smoked. She has never used smokeless tobacco. She reports that she does not drink alcohol or use drugs.      Current Outpatient Medications:   •  allopurinol (ZYLOPRIM) 100 MG tablet, Take 100 mg by mouth Daily., Disp: , Rfl:   •  escitalopram (LEXAPRO) 10 MG tablet, Take 1 tablet by mouth Daily., Disp: 90 tablet, Rfl: 3  •  lisinopril-hydrochlorothiazide (PRINZIDE,ZESTORETIC) 20-12.5 MG per tablet, Take 1 tablet by mouth 2 (Two) Times a Day., Disp: 180 tablet, Rfl: 0  •  melatonin 5 MG tablet tablet, Take 0.5-2 tablets by mouth At Night As  "Needed (sleep)., Disp: 60 each, Rfl: 1  •  Mirabegron ER (MYRBETRIQ) 50 MG tablet sustained-release 24 hour 24 hr tablet, Take 50 mg by mouth Daily., Disp: 28 tablet, Rfl: 0  •  oxybutynin XL (DITROPAN-XL) 10 MG 24 hr tablet, Take 1 tablet by mouth Daily., Disp: 90 tablet, Rfl: 1  •  Pseudoephedrine-Acetaminophen (SINUS PO), Take 1 tablet by mouth Daily., Disp: , Rfl:       OBJECTIVE:  /66 (BP Location: Left arm, Patient Position: Sitting, Cuff Size: Large Adult)   Pulse 60   Temp 97.8 °F (36.6 °C) (Temporal)   Resp 16   Ht 160 cm (63\")   Wt 121 kg (266 lb)   SpO2 98%   BMI 47.12 kg/m²    Physical Exam  Vitals signs reviewed.   Constitutional:       Appearance: She is well-developed.   HENT:      Head: Normocephalic and atraumatic.   Eyes:      Pupils: Pupils are equal, round, and reactive to light.   Neck:      Musculoskeletal: Normal range of motion and neck supple.   Cardiovascular:      Rate and Rhythm: Normal rate and regular rhythm.      Heart sounds: Normal heart sounds.   Pulmonary:      Effort: Pulmonary effort is normal.      Breath sounds: Normal breath sounds.   Abdominal:      General: Bowel sounds are normal.      Palpations: Abdomen is soft.   Musculoskeletal: Normal range of motion.      Left shoulder: She exhibits tenderness and pain. She exhibits normal range of motion.   Skin:     General: Skin is warm and dry.   Neurological:      Mental Status: She is alert and oriented to person, place, and time.         Assessment/Plan    Diagnoses and all orders for this visit:    1. Medicare annual wellness visit, subsequent (Primary)    2. Essential hypertension  Well controlled bu home readings, BP goal for age is <140/90 per JNC 8 guidelines and continue current medications    3. Recurrent major depressive disorder, in full remission (CMS/HCC)  Stable on current therapy    4. Stage 3 chronic kidney disease, unspecified whether stage 3a or 3b CKD  Labs to monitor. Seeing kidney specialist. "     5. Need for vaccination  -     Fluad Quad 65+ yrs ([id number])    6. Class 3 severe obesity due to excess calories with serious comorbidity and body mass index (BMI) of 45.0 to 49.9 in adult (CMS/HCC)  Recommended attention to portion control and being careful about the types and timing of meals for the purpose of weight management.    7. Acute pain of left shoulder  -     XR Shoulder 2+ View Left; Future  Imaging to further evaluate with plan to refer to PT based on findings.     8. Impaired glucose tolerance  Labs to monitor.          An After Visit Summary was printed and given to the patient at discharge.  Return in about 6 months (around 6/7/2021). Sooner if problems arise.          Christi AGARWAL. 12/7/2020   Electronically Signed

## 2020-12-07 NOTE — PROGRESS NOTES
The ABCs of the Annual Wellness Visit  Subsequent Medicare Wellness Visit    Chief Complaint   Patient presents with   • Medicare Wellness-subsequent       Subjective   History of Present Illness:  Sarah Littlejohn is a 70 y.o. female who presents for a Subsequent Medicare Wellness Visit.  See associated note  HEALTH RISK ASSESSMENT    Recent Hospitalizations:  No hospitalization(s) within the last year.    Current Medical Providers:  Patient Care Team:  Michael Singleton DO as PCP - General (Internal Medicine)  Dewayne Santiago MD as Consulting Physician (Nephrology)  Jose Zeng MD as Consulting Physician (Oncology)  Case, Marek Bravo MD as Consulting Physician (Dermatology)  Hannah Marvin MD as Consulting Physician (Ophthalmology)    Smoking Status:  Social History     Tobacco Use   Smoking Status Never Smoker   Smokeless Tobacco Never Used       Alcohol Consumption:  Social History     Substance and Sexual Activity   Alcohol Use No       Depression Screen:   PHQ-2/PHQ-9 Depression Screening 12/7/2020   Little interest or pleasure in doing things 0   Feeling down, depressed, or hopeless 0   Total Score 0       Fall Risk Screen:  STEADI Fall Risk Assessment has not been completed.    Health Habits and Functional and Cognitive Screening:  Functional & Cognitive Status 12/7/2020   Do you have difficulty preparing food and eating? No   Do you have difficulty bathing yourself, getting dressed or grooming yourself? No   Do you have difficulty using the toilet? No   Do you have difficulty moving around from place to place? No   Do you have trouble with steps or getting out of a bed or a chair? No   Current Diet Well Balanced Diet   Dental Exam Not up to date   Eye Exam Not up to date   Exercise (times per week) 0 times per week   Current Exercises Include No Regular Exercise   Current Exercise Activities Include -   Do you need help using the phone?  No   Are you deaf or do you  have serious difficulty hearing?  No   Do you need help with transportation? No   Do you need help shopping? No   Do you need help preparing meals?  No   Do you need help with housework?  No   Do you need help with laundry? No   Do you need help taking your medications? No   Do you need help managing money? No   Do you ever drive or ride in a car without wearing a seat belt? No   Have you felt unusual stress, anger or loneliness in the last month? No   Who do you live with? Spouse   If you need help, do you have trouble finding someone available to you? No   Have you been bothered in the last four weeks by sexual problems? No   Do you have difficulty concentrating, remembering or making decisions? No         Does the patient have evidence of cognitive impairment? No    Asprin use counseling:Does not need ASA (and currently is not on it)    Age-appropriate Screening Schedule:  Refer to the list below for future screening recommendations based on patient's age, sex and/or medical conditions. Orders for these recommended tests are listed in the plan section. The patient has been provided with a written plan.    Health Maintenance   Topic Date Due   • TDAP/TD VACCINES (1 - Tdap) 02/10/1970   • ZOSTER VACCINE (1 of 2) 02/10/2001   • INFLUENZA VACCINE  08/01/2020   • MAMMOGRAM  03/06/2022   • DXA SCAN  06/24/2022   • PAP SMEAR  07/12/2022   • COLONOSCOPY  06/08/2026   • HEMOGLOBIN A1C  Discontinued          The following portions of the patient's history were reviewed and updated as appropriate: allergies, current medications, past family history, past medical history, past social history, past surgical history and problem list.    Outpatient Medications Prior to Visit   Medication Sig Dispense Refill   • allopurinol (ZYLOPRIM) 100 MG tablet Take 100 mg by mouth Daily.     • escitalopram (LEXAPRO) 10 MG tablet Take 1 tablet by mouth Daily. 90 tablet 3   • lisinopril-hydrochlorothiazide (PRINZIDE,ZESTORETIC) 20-12.5 MG per  "tablet Take 1 tablet by mouth 2 (Two) Times a Day. 180 tablet 0   • melatonin 5 MG tablet tablet Take 0.5-2 tablets by mouth At Night As Needed (sleep). 60 each 1   • Mirabegron ER (MYRBETRIQ) 50 MG tablet sustained-release 24 hour 24 hr tablet Take 50 mg by mouth Daily. 28 tablet 0   • oxybutynin XL (DITROPAN-XL) 10 MG 24 hr tablet Take 1 tablet by mouth Daily. 90 tablet 1   • Pseudoephedrine-Acetaminophen (SINUS PO) Take 1 tablet by mouth Daily.       No facility-administered medications prior to visit.        Patient Active Problem List   Diagnosis   • Essential hypertension   • Class 3 severe obesity due to excess calories with serious comorbidity and body mass index (BMI) of 45.0 to 49.9 in adult (CMS/[facility])   • CKD (chronic kidney disease) stage 3, GFR 30-59 ml/min   • Recurrent major depressive disorder, in full remission (CMS/[facility])   • MGUS (monoclonal gammopathy of unknown significance)   • Impaired glucose tolerance   • Urge incontinence   • Asymptomatic hyperuricemia       Advanced Care Planning:  ACP discussion was held with the patient during this visit. Patient has an advance directive (not in EMR), copy requested.    Review of Systems  See associated note  Compared to one year ago, the patient feels her physical health is the same.  Compared to one year ago, the patient feels her mental health is better.    Reviewed chart for potential of high risk medication in the elderly: yes  Reviewed chart for potential of harmful drug interactions in the elderly:yes    Objective         Vitals:    12/07/20 0906   BP: 142/66   BP Location: Left arm   Patient Position: Sitting   Cuff Size: Large Adult   Pulse: 60   Resp: 16   Temp: 97.8 °F (36.6 °C)   TempSrc: Temporal   SpO2: 98%   Weight: 121 kg (266 lb)   Height: 160 cm (63\")       Body mass index is 47.12 kg/m².  Discussed the patient's BMI with her. The BMI is above average; BMI management plan is completed.    Physical Exam  See associated note    "     Assessment/Plan   Medicare Risks and Personalized Health Plan  CMS Preventative Services Quick Reference  Diabetic Lab Screening   Immunizations Discussed/Encouraged (specific immunizations; Influenza and Shingrix )  Obesity/Overweight     The above risks/problems have been discussed with the patient.  Pertinent information has been shared with the patient in the After Visit Summary.  Follow up plans and orders are seen below in the Assessment/Plan Section.    Diagnoses and all orders for this visit:    1. Medicare annual wellness visit, subsequent (Primary)    2. Essential hypertension    3. Recurrent major depressive disorder, in full remission (CMS/[facility])    4. Stage 3 chronic kidney disease, unspecified whether stage 3a or 3b CKD    5. Need for vaccination  -     Fluad Quad 65+ yrs (3550-0254)    6. Class 3 severe obesity due to excess calories with serious comorbidity and body mass index (BMI) of 45.0 to 49.9 in adult (CMS/[facility])    7. Acute pain of left shoulder  -     XR Shoulder 2+ View Left; Future      Follow Up:  Return in about 6 months (around 6/7/2021).     An After Visit Summary and PPPS were given to the patient.

## 2020-12-13 ENCOUNTER — HOSPITAL ENCOUNTER (EMERGENCY)
Age: 70
Discharge: HOME | End: 2020-12-13
Payer: MEDICARE

## 2020-12-13 ENCOUNTER — APPOINTMENT (OUTPATIENT)
Dept: GENERAL RADIOLOGY | Age: 70
End: 2020-12-13
Payer: MEDICARE

## 2020-12-13 VITALS
TEMPERATURE: 98.7 F | RESPIRATION RATE: 17 BRPM | HEIGHT: 63 IN | WEIGHT: 250 LBS | SYSTOLIC BLOOD PRESSURE: 155 MMHG | OXYGEN SATURATION: 97 % | DIASTOLIC BLOOD PRESSURE: 83 MMHG | HEART RATE: 68 BPM | BODY MASS INDEX: 44.3 KG/M2

## 2020-12-13 PROCEDURE — 99283 EMERGENCY DEPT VISIT LOW MDM: CPT

## 2020-12-13 PROCEDURE — 73630 X-RAY EXAM OF FOOT: CPT

## 2020-12-13 PROCEDURE — 99999 PR OFFICE/OUTPT VISIT,PROCEDURE ONLY: CPT | Performed by: PHYSICIAN ASSISTANT

## 2020-12-13 RX ORDER — ALLOPURINOL 100 MG/1
100 TABLET ORAL DAILY
COMMUNITY
Start: 2020-09-25

## 2020-12-13 SDOH — HEALTH STABILITY: MENTAL HEALTH: HOW OFTEN DO YOU HAVE A DRINK CONTAINING ALCOHOL?: NEVER

## 2020-12-13 ASSESSMENT — PAIN DESCRIPTION - ORIENTATION: ORIENTATION: RIGHT

## 2020-12-13 ASSESSMENT — ENCOUNTER SYMPTOMS
APNEA: 0
EYE PAIN: 0
COUGH: 0
SHORTNESS OF BREATH: 0
SORE THROAT: 0
EYE DISCHARGE: 0
PHOTOPHOBIA: 0
RHINORRHEA: 0
COLOR CHANGE: 0
ABDOMINAL DISTENTION: 0
ABDOMINAL PAIN: 0
BACK PAIN: 0
NAUSEA: 0

## 2020-12-13 ASSESSMENT — PAIN DESCRIPTION - PAIN TYPE: TYPE: ACUTE PAIN

## 2020-12-13 ASSESSMENT — PAIN DESCRIPTION - LOCATION: LOCATION: FOOT

## 2020-12-13 ASSESSMENT — PAIN DESCRIPTION - DESCRIPTORS: DESCRIPTORS: DULL

## 2020-12-13 ASSESSMENT — PAIN SCALES - GENERAL: PAINLEVEL_OUTOF10: 5

## 2020-12-13 NOTE — ED PROVIDER NOTES
[facility] EMERGENCY DEPT  eMERGENCYdEPARTMENT eNCOUnter      Pt Name: Megha Conway  MRN: [id number]  [place] 1950  Date of evaluation: 12/13/2020  Provider:ELIANA Antonio    CHIEF COMPLAINT       Chief Complaint   Patient presents with    Foot Injury     right foot, shut in truck dooron Thursday         HISTORY OF PRESENT ILLNESS  (Location/Symptom, Timing/Onset, Context/Setting, Quality, Duration, Modifying Factors, Severity.)   Megha Conway is a 72 y.o. female who presents to the emergency department with complaints of pain in proximal foot right side after accidentally shutting it in the truck door on Thursday she has been ambulatory this entire episode denies any numbness or tingling distal to the site of trauma no obvious wound there is subtle swelling over the foot no erythema or discoloration. She has some pain referred to her right ankle but feels that it moves well actively. No other complaints including proximal tib-fib. HPI    Nursing Notes were reviewed and I agree. REVIEW OF SYSTEMS    (2-9 systems for level 4, 10 or more for level 5)     Review of Systems   Constitutional: Negative for activity change, appetite change, chills and fever. HENT: Negative for congestion, postnasal drip, rhinorrhea and sore throat. Eyes: Negative for photophobia, pain, discharge and visual disturbance. Respiratory: Negative for apnea, cough and shortness of breath. Cardiovascular: Negative for chest pain and leg swelling. Gastrointestinal: Negative for abdominal distention, abdominal pain and nausea. Genitourinary: Negative for vaginal bleeding. Musculoskeletal: Positive for arthralgias and gait problem. Negative for back pain, joint swelling, neck pain and neck stiffness. Skin: Negative for color change and rash. Neurological: Negative for dizziness, syncope, facial asymmetry and headaches. Hematological: Negative for adenopathy. Does not bruise/bleed easily. Psychiatric/Behavioral: Negative for agitation, behavioral problems and confusion. Except as noted above the remainder of the review of systems was reviewed and negative.        PAST MEDICAL HISTORY     Past Medical History:   Diagnosis Date    Anxiety     Depression     Diabetes mellitus ([address]     Hypertension     Hyperuricemia     Kidney failure     Renal cyst, right     Seasonal allergies          SURGICAL HISTORY       Past Surgical History:   Procedure Laterality Date    BONE MARROW BIOPSY N/A 1/23/2017    BONE MARROW ASPIRATION BIOPSY performed by Ivelisse Morales MD at [facility] CATARACT REMOVAL Bilateral 2016   [facility]         CURRENT MEDICATIONS       Discharge Medication List as of 12/13/2020  1:28 PM      CONTINUE these medications which have NOT CHANGED    Details   allopurinol (ZYLOPRIM) 100 MG tablet Take 100 mg by mouth dailyHistorical Med      escitalopram (LEXAPRO) 10 MG tablet Take 10 mg by mouth dailyHistorical Med      melatonin 5 MG TABS tablet Take 2.5-10 mg by mouth nightly as neededHistorical Med      lisinopril (PRINIVIL;ZESTRIL) 20 MG tablet Take 20 mg by mouth daily      oxybutynin (DITROPAN) 5 MG tablet Take 5 mg by mouth daily      Multiple Vitamins-Minerals (THERAPEUTIC MULTIVITAMIN-MINERALS) tablet Take 1 tablet by mouth daily             ALLERGIES     Neosporin [neomycin-polymyxin-gramicidin], Neomycin-bacitracin zn-polymyx, and Penicillins    FAMILY HISTORY       Family History   Problem Relation Age of Onset    Other Mother         Lymphoma    Cancer Father         Leukemia          SOCIAL HISTORY       Social History     Socioeconomic History    Marital status:      Spouse name: None    Number of children: None    Years of education: None    Highest education level: None   Occupational History    None   Social Needs    Financial resource strain: None    Food insecurity     Worry: None     Inability: None    Transportation needs     Medical: None     Non-medical: None   Tobacco Use    Smoking status: Never Smoker    Smokeless tobacco: Never Used   Substance and Sexual Activity    Alcohol use: Not Currently     Frequency: Never    Drug use: Never    Sexual activity: None   Lifestyle    Physical activity     Days per week: None     Minutes per session: None    Stress: None   Relationships    Social connections     Talks on phone: None     Gets together: None     Attends [employer] service: None     Active member of club or organization: None     Attends meetings of clubs or organizations: None     Relationship status: None    Intimate partner violence     Fear of current or ex partner: None     Emotionally abused: None     Physically abused: None     Forced sexual activity: None   Other Topics Concern    None   Social History Narrative    None       SCREENINGS           PHYSICAL EXAM    (up to 7 forlevel 4, 8 or more for level 5)     ED Triage Vitals [12/13/20 1200]   BP Temp Temp src Pulse Resp SpO2 Height Weight   (!) 183/68 98.7 °F (37.1 °C) -- 62 18 97 % 5' 3\" (1.6 m) 250 lb (113.4 kg)       Physical Exam  Vitals signs and nursing note reviewed. Constitutional:       General: She is not in acute distress. Appearance: She is well-developed. She is not diaphoretic. HENT:      Head: Normocephalic and atraumatic. Right Ear: External ear normal.      Left Ear: External ear normal.      Mouth/Throat:      Pharynx: No oropharyngeal exudate. Eyes:      General:         Right eye: No discharge. Left eye: No discharge. Pupils: Pupils are equal, round, and reactive to light. Neck:      Musculoskeletal: Normal range of motion and neck supple. Thyroid: No thyromegaly. Cardiovascular:      Rate and Rhythm: Normal rate and regular rhythm. Pulses: Normal pulses. Heart sounds: Normal heart sounds. No murmur. No friction rub. Pulmonary:      Effort: Pulmonary effort is normal. No respiratory distress. Breath sounds: Normal breath sounds. No stridor. No wheezing. Abdominal:      General: Bowel sounds are normal. There is no distension. Palpations: Abdomen is soft. Tenderness: There is no abdominal tenderness. Musculoskeletal: Normal range of motion. General: Swelling, tenderness and signs of injury present. Skin:     General: Skin is warm and dry. Capillary Refill: Capillary refill takes less than 2 seconds. Findings: No rash. Neurological:      General: No focal deficit present. Mental Status: She is alert and oriented to person, place, and time. Mental status is at baseline. Cranial Nerves: No cranial nerve deficit. Sensory: No sensory deficit. Coordination: Coordination normal.   Psychiatric:         Mood and Affect: Mood normal.         Behavior: Behavior normal.         Thought Content: Thought content normal.         Judgment: Judgment normal.           DIAGNOSTIC RESULTS     RADIOLOGY:   Non-plain film images such as CT, Ultrasound and MRI are read by the radiologist. Plain radiographic images are visualized and preliminarilyinterpreted by No att. providers found with the below findings:      Interpretation per the Radiologist below, if available at the time of this note:    XR FOOT RIGHT (MIN 3 VIEWS)   Final Result   1. No acute osseous abnormality. Signed by Dr Adalid Larose on 12/13/2020 12:40 PM          LABS:  Labs Reviewed - No data to display    All other labs were within normal range or notreturned as of this dictation.     RE-ASSESSMENT        EMERGENCY DEPARTMENT COURSE and DIFFERENTIAL DIAGNOSIS/MDM:   Vitals:    Vitals:    12/13/20 1200 12/13/20 1334   BP: (!) 183/68 (!) 155/83   Pulse: 62 68   Resp: 18 17   Temp: 98.7 °F (37.1 °C) 98.7 °F (37.1 °C)   SpO2: 97% 97%   Weight: 250 lb (113.4 kg)    Height: 5' 3\" (1.6 m)        MDM  Patient is nontender ankle which is why elected not to include imaging the x-ray on her foot includes ankle joint does not support any fracture to this the foot. The fact this is 4 days in with no fracture at she is been walking on it I do not feel that she would have created more of a hairline if there was something there. There is mild swelling which could be related to some sort of strain or sprain. I advised to keep compression on follow the rice pneumonic and if symptoms should still persist after 72 hours seek PCP referral to orthopedics. I do feel that this is likely a sprain that should resolve as she is probably not given a chance to really heal.  Patient is agreeable her pain is very low I am optimistic should be a good prognosis but if not she understands to follow. PROCEDURES:    Procedures      FINAL IMPRESSION      1.  Contusion of left foot, initial encounter          DISPOSITION/PLAN   DISPOSITION Decision To Discharge 12/13/2020 01:33:40 PM      PATIENT REFERRED TO:  [address] [place] [address] EMERGENCY DEPT  [facility]  [phone]    If symptoms worsen    Huma Lane MD  [facility] [phone]    Schedule an appointment as soon as possible for a visit         DISCHARGE MEDICATIONS:  Discharge Medication List as of 12/13/2020  1:28 PM          (Please note that portions of this note were completed with a voice recognition program.  Efforts were made to edit the dictations but occasionallywords are mis-transcribed.)    Levon Erazo [address] [place] [address]  12/13/20 0763

## 2020-12-17 NOTE — PROGRESS NOTES
was negative for any lytic lesions    Bone marrow aspiration and biopsy performed in [facility] on 1/23/2017   · Normocellular (4050%) with trilineage hematopoiesis and no evidence of dysplasia nor increase in blasts. · There were adequate iron stores. · There was no diagnostic evidence of plasma cell dyscrasia nor lymphoproliferative disorder. · Molecular testing for Bcell gene rearrangements were negative. · Cytogenetics were normal female karyotype. CTs of the soft tissue neck, chest, abdomen was performed on 2/9/2017 at [facility] which were unrevealing for any lymphadenopathy or suspicious lesions for lymphoplasmacytic lymphoma. There were 2 lesions noted in the right kidney that were most likely cyst with an ultrasound of the kidney performed on 2/16/2017 revealing these to be simple cysts. With CT scans being unrevealing her final diagnosis is that of an IgM MGUS. She will be followed up in the office for physical examinations and serologic testing periodically. With stable serology, frequency a followup will be decreased. TUMOR HISTORY: 7 mm RML nodule  Baseline CT scans without IV contrast were performed at [facility] for evaluation of her monoclonal gammopathy. The CT of the chest without contrast on 2/9/2017 revealed a 7 mm nodule in the RML. Follow-up CT of the chest without contrast was performed at [facility] on 5/25/2017 and compared to 2/9/2017. There was a 7 mm nodular density in the RML which appears to be an area of fibrosis. The 4 mm nodule in the RLL is stable and recommendations is to follow-up again in 6 months. CT of the chest on 12/4/2017 and compared to the prior study of 2/25/2017 which revealed a stable 5 mm groundglass nodule in the RLL. This had not changed since May 2017 with minimal increase in size since February 2008. Based on the size of the nodule and the Fleischner Society recommendations no further repeat CT scanning is required.         Past Medical History:   Diagnosis Date  Anxiety     Depression     Diabetes mellitus ([facility] [address]     Hypertension     Hyperuricemia     Kidney failure     Renal cyst, right     Seasonal allergies         Past Surgical History:   Procedure Laterality Date    BONE MARROW BIOPSY N/A 1/23/2017    BONE MARROW ASPIRATION BIOPSY performed by Maria Antonia Fischer MD at [address] CATARACT REMOVAL Bilateral 2016    [facility]           Current Outpatient Medications:     allopurinol (ZYLOPRIM) 100 MG tablet, Take 100 mg by mouth daily, Disp: , Rfl:     escitalopram (LEXAPRO) 10 MG tablet, Take 10 mg by mouth daily, Disp: , Rfl:     melatonin 5 MG TABS tablet, Take 2.5-10 mg by mouth nightly as needed, Disp: , Rfl:     lisinopril (PRINIVIL;ZESTRIL) 20 MG tablet, Take 20 mg by mouth daily, Disp: , Rfl:     oxybutynin (DITROPAN) 5 MG tablet, Take 5 mg by mouth daily, Disp: , Rfl:     Multiple Vitamins-Minerals (THERAPEUTIC MULTIVITAMIN-MINERALS) tablet, Take 1 tablet by mouth daily, Disp: , Rfl:      Allergies   Allergen Reactions    Neosporin [Neomycin-Polymyxin-Gramicidin] Dermatitis    Neomycin-Bacitracin Zn-Polymyx Rash    Penicillins Rash       Social History     Tobacco Use    Smoking status: Never Smoker    Smokeless tobacco: Never Used   Substance Use Topics    Alcohol use: Not Currently     Frequency: Never    Drug use: Never       Family History   Problem Relation Age of Onset    Other Mother         Lymphoma    Cancer Father         Leukemia       Subjective   REVIEW OF SYSTEMS:   Review of Systems   Constitutional: Negative for chills, diaphoresis, fatigue, fever and unexpected weight change. HENT: Negative for mouth sores, nosebleeds, sore throat, trouble swallowing and voice change. Eyes: Negative for photophobia, discharge and itching. Respiratory: Negative for cough, shortness of breath and wheezing.     Cardiovascular: Negative for chest pain, palpitations and leg swelling. Gastrointestinal: Negative for abdominal distention, abdominal pain, blood in stool, constipation, diarrhea, nausea and vomiting. Endocrine: Negative for cold intolerance, heat intolerance, polydipsia and polyuria. Genitourinary: Negative for difficulty urinating, dysuria, hematuria and urgency. Musculoskeletal: Positive for arthralgias. Negative for back pain, joint swelling and myalgias. Skin: Negative for color change and rash. Allergic/Immunologic: Positive for environmental allergies. Neurological: Negative for dizziness, tremors, seizures, syncope and light-headedness. Hematological: Negative for adenopathy. Does not bruise/bleed easily. Psychiatric/Behavioral: Negative for behavioral problems and suicidal ideas. The patient is not nervous/anxious. Objective   /78   Pulse 83   Temp 98 °F (36.7 °C)   Ht 5' 3\" (1.6 m)   Wt 266 lb 6.4 oz (120.8 kg)   SpO2 96%   BMI 47.19 kg/m²     PHYSICAL EXAM:  Physical Exam  Constitutional:       Appearance: She is well-developed. She is obese. HENT:      Head: Normocephalic and atraumatic. Eyes:      General: No scleral icterus. Conjunctiva/sclera: Conjunctivae normal.   Neck:      Musculoskeletal: Normal range of motion and neck supple. Trachea: No tracheal deviation. Cardiovascular:      Rate and Rhythm: Normal rate and regular rhythm. Heart sounds: Normal heart sounds. No murmur. Pulmonary:      Effort: Pulmonary effort is normal. No respiratory distress. Breath sounds: Normal breath sounds. Abdominal:      General: Bowel sounds are normal. There is no distension. Palpations: Abdomen is soft. Tenderness: There is no abdominal tenderness. Musculoskeletal:         General: No tenderness. Comments: Full ROM in all 4 extremities   Skin:     General: Skin is warm and dry. Findings: No rash.    Neurological:      Mental Status: She is alert and oriented to person, place, and time. Coordination: Coordination normal.   Psychiatric:         Behavior: Behavior normal.         Thought Content: Thought content normal.         Lab Results   Component Value Date    WBC 7.36 12/18/2020    HGB 12.9 12/18/2020    HCT 39.4 12/18/2020    .9 (H) 12/18/2020     (L) 12/18/2020         VISIT DIAGNOSES  1. MGUS (monoclonal gammopathy of unknown significance)    2. Eosinophilic leukocytosis, unspecified type    3. Macrocytosis        ASSESSMENT/PLAN:    1. IgM MGUS. Serology 1/24/2019  Crt - 1.49 with GFR down improved to 36  Ca - WNL - 9.5  QI - IgM 316 - stable  SPEP - M spike of 0.4 g/dL - stable  Free serum light chains - kappa 96.2, lambda 32.9, K/L ratio 2.92 - all stable    Serology 2/28/2020  QI -IgG 1325, IgA 266, IgM 414 ()  SPEP - M spike 0.4 g/dL with immunofixation revealing IgM kappa  Free serum light chains - kappa 129.6, lambda 48.1, K/L ratio 2.69    Serology 6/26/2020  QI - IgG 1260, IgA 248, IgM 392  SPEP - M spike 0.6 g/dL with immunofixation revealing IgM kappa  Free serum light chains - kappa 143.5, lambda 42.7, K/L ratio 3.36  CMP - crt 1.6 with GFR 32      C - ca 9.4  R - crt 1.6 with GFR 32 followed by Dr. Jeanine Romero  A - HGB   B - no bone pain or lesions      B12 = 540  Folate - 24.1    CBC today reveals a WBC of 7.36, Hgb 12.9 with .9, PLT 1 50,000. The B12 and folate levels checked last visit were within normal range. I did discuss that her platelet count has dropped slightly. We will continue to monitor her CBC. Protein serology will be rechecked today. 2.  Eosinophilia. WBC 7.24 with 13% eosinophils with an absolute eosinophil count of 0.94 on 2/28/2020. CBC today reveals WBC of 7.36 with only 7.6% eosinophilsabsolute eosinophil count 0.56. IgE - 28 (6 - 495)      3. Breast cancer screening. Bilateral screening mammogram 3/6/2020 at [facility] was BI-RADS 1.      4.  Colon cancer screening.   Her colonoscopy is up-to-date and will not be due again until 2026    5. Cervical cancer screening. Pap smear 7/12/2019 was negative. Orders Placed This Encounter   Procedures    Comprehensive Metabolic Panel    Electrophoresis Protein, Serum with Reflex to Immunofixation    [place]/Lambda Free Lt Chains, Serum Quant    Beta 2 Microglobulin, Serum        Return in about 6 months (around 6/18/2021) for With [facility].      Adriana Betts PA-C  9:13 AM  12/18/2020

## 2020-12-18 ENCOUNTER — OFFICE VISIT (OUTPATIENT)
Dept: HEMATOLOGY | Age: 70
End: 2020-12-18
Payer: MEDICARE

## 2020-12-18 ENCOUNTER — HOSPITAL ENCOUNTER (OUTPATIENT)
Dept: INFUSION THERAPY | Age: 70
Discharge: HOME | End: 2020-12-18
Payer: MEDICARE

## 2020-12-18 VITALS
BODY MASS INDEX: 47.2 KG/M2 | DIASTOLIC BLOOD PRESSURE: 78 MMHG | WEIGHT: 266.4 LBS | OXYGEN SATURATION: 96 % | SYSTOLIC BLOOD PRESSURE: 130 MMHG | HEIGHT: 63 IN | TEMPERATURE: 98 F | HEART RATE: 83 BPM

## 2020-12-18 DIAGNOSIS — D75.89 MACROCYTOSIS: ICD-10-CM

## 2020-12-18 DIAGNOSIS — D47.2 MGUS (MONOCLONAL GAMMOPATHY OF UNKNOWN SIGNIFICANCE): ICD-10-CM

## 2020-12-18 LAB
ALBUMIN SERPL-MCNC: 4 G/DL (ref 3.5–5.2)
ALP BLD-CCNC: 111 U/L (ref 35–104)
ALT SERPL-CCNC: 23 U/L (ref 9–52)
ANION GAP SERPL CALCULATED.3IONS-SCNC: 13 MMOL/L (ref 7–19)
AST SERPL-CCNC: 35 U/L (ref 14–36)
BASOPHILS ABSOLUTE: 0.01 K/UL (ref 0.01–0.08)
BASOPHILS RELATIVE PERCENT: 0.1 % (ref 0.1–1.2)
BILIRUB SERPL-MCNC: 0.5 MG/DL (ref 0.2–1.3)
BUN BLDV-MCNC: 30 MG/DL (ref 7–17)
CALCIUM SERPL-MCNC: 10.1 MG/DL (ref 8.4–10.2)
CHLORIDE BLD-SCNC: 104 MMOL/L (ref 98–111)
CO2: 28 MMOL/L (ref 22–29)
CREAT SERPL-MCNC: 1.5 MG/DL (ref 0.5–1)
EOSINOPHILS ABSOLUTE: 0.56 K/UL (ref 0.04–0.54)
EOSINOPHILS RELATIVE PERCENT: 7.6 % (ref 0.7–7)
GFR NON-AFRICAN AMERICAN: 34
GLOBULIN: 3.2 G/DL
GLUCOSE BLD-MCNC: 123 MG/DL (ref 74–106)
HCT VFR BLD CALC: 39.4 % (ref 34.1–44.9)
HEMOGLOBIN: 12.9 G/DL (ref 11.2–15.7)
LYMPHOCYTES ABSOLUTE: 1.93 K/UL (ref 1.18–3.74)
LYMPHOCYTES RELATIVE PERCENT: 26.2 % (ref 19.3–53.1)
MCH RBC QN AUTO: 33.7 PG (ref 25.6–32.2)
MCHC RBC AUTO-ENTMCNC: 32.7 G/DL (ref 32.3–35.5)
MCV RBC AUTO: 102.9 FL (ref 79.4–94.8)
MONOCYTES ABSOLUTE: 0.67 K/UL (ref 0.24–0.82)
MONOCYTES RELATIVE PERCENT: 9.1 % (ref 4.7–12.5)
NEUTROPHILS ABSOLUTE: 4.19 K/UL (ref 1.56–6.13)
NEUTROPHILS RELATIVE PERCENT: 57 % (ref 34–71.1)
PDW BLD-RTO: 12.2 % (ref 11.7–14.4)
PLATELET # BLD: 150 K/UL (ref 182–369)
PMV BLD AUTO: 10.8 FL (ref 7.4–10.4)
POTASSIUM SERPL-SCNC: 4.9 MMOL/L (ref 3.5–5.1)
RBC # BLD: 3.83 M/UL (ref 3.93–5.22)
SODIUM BLD-SCNC: 145 MMOL/L (ref 137–145)
TOTAL PROTEIN: 7.2 G/DL (ref 6.3–8.2)
WBC # BLD: 7.36 K/UL (ref 3.98–10.04)

## 2020-12-18 PROCEDURE — 1036F TOBACCO NON-USER: CPT | Performed by: PHYSICIAN ASSISTANT

## 2020-12-18 PROCEDURE — 99213 OFFICE O/P EST LOW 20 MIN: CPT | Performed by: PHYSICIAN ASSISTANT

## 2020-12-18 PROCEDURE — 3017F COLORECTAL CA SCREEN DOC REV: CPT | Performed by: PHYSICIAN ASSISTANT

## 2020-12-18 PROCEDURE — 80053 COMPREHEN METABOLIC PANEL: CPT

## 2020-12-18 PROCEDURE — G8417 CALC BMI ABV UP PARAM F/U: HCPCS | Performed by: PHYSICIAN ASSISTANT

## 2020-12-18 PROCEDURE — 1123F ACP DISCUSS/DSCN MKR DOCD: CPT | Performed by: PHYSICIAN ASSISTANT

## 2020-12-18 PROCEDURE — 85025 COMPLETE CBC W/AUTO DIFF WBC: CPT

## 2020-12-18 PROCEDURE — G8427 DOCREV CUR MEDS BY ELIG CLIN: HCPCS | Performed by: PHYSICIAN ASSISTANT

## 2020-12-18 PROCEDURE — 4040F PNEUMOC VAC/ADMIN/RCVD: CPT | Performed by: PHYSICIAN ASSISTANT

## 2020-12-18 PROCEDURE — G8400 PT W/DXA NO RESULTS DOC: HCPCS | Performed by: PHYSICIAN ASSISTANT

## 2020-12-18 PROCEDURE — G8484 FLU IMMUNIZE NO ADMIN: HCPCS | Performed by: PHYSICIAN ASSISTANT

## 2020-12-18 PROCEDURE — 99211 OFF/OP EST MAY X REQ PHY/QHP: CPT

## 2020-12-18 PROCEDURE — 1090F PRES/ABSN URINE INCON ASSESS: CPT | Performed by: PHYSICIAN ASSISTANT

## 2020-12-18 ASSESSMENT — ENCOUNTER SYMPTOMS
COUGH: 0
VOICE CHANGE: 0
PHOTOPHOBIA: 0
VOMITING: 0
BLOOD IN STOOL: 0
EYE ITCHING: 0
WHEEZING: 0
TROUBLE SWALLOWING: 0
ABDOMINAL PAIN: 0
NAUSEA: 0
SHORTNESS OF BREATH: 0
ABDOMINAL DISTENTION: 0
CONSTIPATION: 0
SORE THROAT: 0
DIARRHEA: 0
EYE DISCHARGE: 0
COLOR CHANGE: 0
BACK PAIN: 0

## 2021-01-25 RX ORDER — LISINOPRIL AND HYDROCHLOROTHIAZIDE 20; 12.5 MG/1; MG/1
TABLET ORAL
Qty: 180 TABLET | Refills: 2 | Status: SHIPPED | OUTPATIENT
Start: 2021-01-25 | End: 2021-12-30 | Stop reason: SDUPTHER

## 2021-01-28 DIAGNOSIS — F33.41 RECURRENT MAJOR DEPRESSIVE DISORDER, IN PARTIAL REMISSION (HCC): ICD-10-CM

## 2021-01-28 RX ORDER — ESCITALOPRAM OXALATE 10 MG/1
10 TABLET ORAL DAILY
Qty: 90 TABLET | Refills: 3 | Status: SHIPPED | OUTPATIENT
Start: 2021-01-28 | End: 2021-12-30 | Stop reason: SDUPTHER

## 2021-03-26 DIAGNOSIS — N39.41 URGE INCONTINENCE: ICD-10-CM

## 2021-03-26 DIAGNOSIS — N32.81 OVERACTIVE BLADDER: ICD-10-CM

## 2021-03-26 RX ORDER — OXYBUTYNIN CHLORIDE 10 MG/1
10 TABLET, EXTENDED RELEASE ORAL DAILY
Qty: 90 TABLET | Refills: 1 | Status: SHIPPED | OUTPATIENT
Start: 2021-03-26 | End: 2021-10-19

## 2021-04-02 ENCOUNTER — OFFICE VISIT (OUTPATIENT)
Age: 71
End: 2021-04-02

## 2021-04-02 VITALS — TEMPERATURE: 96.9 F

## 2021-04-02 DIAGNOSIS — Z11.59 SCREENING FOR VIRAL DISEASE: Primary | ICD-10-CM

## 2021-04-02 LAB — SARS-COV-2, PCR: NOT DETECTED

## 2021-04-02 PROCEDURE — 99999 PR OFFICE/OUTPT VISIT,PROCEDURE ONLY: CPT | Performed by: NURSE PRACTITIONER

## 2021-06-08 ENCOUNTER — OFFICE VISIT (OUTPATIENT)
Dept: INTERNAL MEDICINE | Facility: CLINIC | Age: 71
End: 2021-06-08

## 2021-06-08 ENCOUNTER — LAB (OUTPATIENT)
Dept: LAB | Facility: HOSPITAL | Age: 71
End: 2021-06-08

## 2021-06-08 VITALS
TEMPERATURE: 98 F | WEIGHT: 271.5 LBS | OXYGEN SATURATION: 98 % | SYSTOLIC BLOOD PRESSURE: 132 MMHG | RESPIRATION RATE: 16 BRPM | HEART RATE: 54 BPM | DIASTOLIC BLOOD PRESSURE: 80 MMHG | HEIGHT: 63 IN | BODY MASS INDEX: 48.11 KG/M2

## 2021-06-08 DIAGNOSIS — Z00.01 ANNUAL VISIT FOR GENERAL ADULT MEDICAL EXAMINATION WITH ABNORMAL FINDINGS: ICD-10-CM

## 2021-06-08 DIAGNOSIS — N18.32 STAGE 3B CHRONIC KIDNEY DISEASE (HCC): ICD-10-CM

## 2021-06-08 DIAGNOSIS — F33.42 RECURRENT MAJOR DEPRESSIVE DISORDER, IN FULL REMISSION (HCC): ICD-10-CM

## 2021-06-08 DIAGNOSIS — R73.02 IMPAIRED GLUCOSE TOLERANCE: ICD-10-CM

## 2021-06-08 DIAGNOSIS — E66.01 CLASS 3 SEVERE OBESITY DUE TO EXCESS CALORIES WITH SERIOUS COMORBIDITY AND BODY MASS INDEX (BMI) OF 45.0 TO 49.9 IN ADULT (HCC): ICD-10-CM

## 2021-06-08 DIAGNOSIS — I10 ESSENTIAL HYPERTENSION: Primary | ICD-10-CM

## 2021-06-08 LAB
ALBUMIN SERPL-MCNC: 4.1 G/DL (ref 3.5–5.2)
ALBUMIN/GLOB SERPL: 1.1 G/DL
ALP SERPL-CCNC: 107 U/L (ref 39–117)
ALT SERPL W P-5'-P-CCNC: 14 U/L (ref 1–33)
ANION GAP SERPL CALCULATED.3IONS-SCNC: 10 MMOL/L (ref 5–15)
AST SERPL-CCNC: 21 U/L (ref 1–32)
BACTERIA UR QL AUTO: ABNORMAL /HPF
BILIRUB SERPL-MCNC: 0.4 MG/DL (ref 0–1.2)
BILIRUB UR QL STRIP: NEGATIVE
BUN SERPL-MCNC: 33 MG/DL (ref 8–23)
BUN/CREAT SERPL: 23.4 (ref 7–25)
CALCIUM SPEC-SCNC: 10 MG/DL (ref 8.6–10.5)
CHLORIDE SERPL-SCNC: 106 MMOL/L (ref 98–107)
CHOLEST SERPL-MCNC: 146 MG/DL (ref 0–200)
CLARITY UR: CLEAR
CO2 SERPL-SCNC: 25 MMOL/L (ref 22–29)
COLOR UR: YELLOW
CREAT SERPL-MCNC: 1.41 MG/DL (ref 0.57–1)
CREAT UR-MCNC: 93.6 MG/DL
DEPRECATED RDW RBC AUTO: 43.7 FL (ref 37–54)
ERYTHROCYTE [DISTWIDTH] IN BLOOD BY AUTOMATED COUNT: 12.4 % (ref 12.3–15.4)
GFR SERPL CREATININE-BSD FRML MDRD: 37 ML/MIN/1.73
GLOBULIN UR ELPH-MCNC: 3.9 GM/DL
GLUCOSE SERPL-MCNC: 107 MG/DL (ref 65–99)
GLUCOSE UR STRIP-MCNC: NEGATIVE MG/DL
HBA1C MFR BLD: 5.8 % (ref 4.8–5.6)
HCT VFR BLD AUTO: 38.9 % (ref 34–46.6)
HDLC SERPL-MCNC: 48 MG/DL (ref 40–60)
HGB BLD-MCNC: 12.9 G/DL (ref 12–15.9)
HGB UR QL STRIP.AUTO: NEGATIVE
HYALINE CASTS UR QL AUTO: ABNORMAL /LPF
KETONES UR QL STRIP: NEGATIVE
LDLC SERPL CALC-MCNC: 75 MG/DL (ref 0–100)
LDLC/HDLC SERPL: 1.5 {RATIO}
LEUKOCYTE ESTERASE UR QL STRIP.AUTO: ABNORMAL
MCH RBC QN AUTO: 31.8 PG (ref 26.6–33)
MCHC RBC AUTO-ENTMCNC: 33.2 G/DL (ref 31.5–35.7)
MCV RBC AUTO: 95.8 FL (ref 79–97)
NITRITE UR QL STRIP: NEGATIVE
PH UR STRIP.AUTO: 5.5 [PH] (ref 5–8)
PLATELET # BLD AUTO: 161 10*3/MM3 (ref 140–450)
PMV BLD AUTO: 12.4 FL (ref 6–12)
POTASSIUM SERPL-SCNC: 4.1 MMOL/L (ref 3.5–5.2)
PROT SERPL-MCNC: 8 G/DL (ref 6–8.5)
PROT UR QL STRIP: NEGATIVE
PROT UR-MCNC: 5 MG/DL
PROT/CREAT UR: 53.4 MG/G CREA (ref 0–200)
RBC # BLD AUTO: 4.06 10*6/MM3 (ref 3.77–5.28)
RBC # UR: ABNORMAL /HPF
REF LAB TEST METHOD: ABNORMAL
SODIUM SERPL-SCNC: 141 MMOL/L (ref 136–145)
SP GR UR STRIP: 1.02 (ref 1–1.03)
SQUAMOUS #/AREA URNS HPF: ABNORMAL /HPF
TRIGL SERPL-MCNC: 129 MG/DL (ref 0–150)
UROBILINOGEN UR QL STRIP: ABNORMAL
VLDLC SERPL-MCNC: 23 MG/DL (ref 5–40)
WBC # BLD AUTO: 8.19 10*3/MM3 (ref 3.4–10.8)
WBC UR QL AUTO: ABNORMAL /HPF

## 2021-06-08 PROCEDURE — 80053 COMPREHEN METABOLIC PANEL: CPT

## 2021-06-08 PROCEDURE — 80061 LIPID PANEL: CPT

## 2021-06-08 PROCEDURE — 36415 COLL VENOUS BLD VENIPUNCTURE: CPT

## 2021-06-08 PROCEDURE — 85027 COMPLETE CBC AUTOMATED: CPT

## 2021-06-08 PROCEDURE — 84156 ASSAY OF PROTEIN URINE: CPT

## 2021-06-08 PROCEDURE — 81001 URINALYSIS AUTO W/SCOPE: CPT

## 2021-06-08 PROCEDURE — 99214 OFFICE O/P EST MOD 30 MIN: CPT | Performed by: INTERNAL MEDICINE

## 2021-06-08 PROCEDURE — 82570 ASSAY OF URINE CREATININE: CPT

## 2021-06-08 PROCEDURE — 83036 HEMOGLOBIN GLYCOSYLATED A1C: CPT

## 2021-06-08 RX ORDER — MULTIPLE VITAMINS W/ MINERALS TAB 9MG-400MCG
1 TAB ORAL DAILY
COMMUNITY
End: 2022-12-13

## 2021-06-08 NOTE — PROGRESS NOTES
"CC: f/u CKD    History:  Sarah Littlejohn is a 71 y.o. female   She reports she has been doing well without any acute illness.  She notes no changes in urinary quality or quantity and continues on RAAS blockade.  Mood and blood pressure are well controlled on current medications.       ROS:  Review of Systems   Constitutional: Negative for chills and fever.   Respiratory: Negative for cough and shortness of breath.    Cardiovascular: Negative for chest pain and palpitations.   Gastrointestinal: Negative for abdominal pain and constipation.   Genitourinary: Negative for decreased urine volume and difficulty urinating.        reports that she has never smoked. She has never used smokeless tobacco. She reports that she does not drink alcohol and does not use drugs.      Current Outpatient Medications:   •  allopurinol (ZYLOPRIM) 100 MG tablet, Take 100 mg by mouth Daily., Disp: , Rfl:   •  escitalopram (LEXAPRO) 10 MG tablet, TAKE 1 TABLET BY MOUTH DAILY, Disp: 90 tablet, Rfl: 3  •  lisinopril-hydrochlorothiazide (PRINZIDE,ZESTORETIC) 20-12.5 MG per tablet, TAKE 1 TABLET BY MOUTH TWICE A DAY, Disp: 180 tablet, Rfl: 2  •  melatonin 5 MG tablet tablet, Take 0.5-2 tablets by mouth At Night As Needed (sleep)., Disp: 60 each, Rfl: 1  •  multivitamin with minerals tablet tablet, Take 1 tablet by mouth Daily., Disp: , Rfl:   •  oxybutynin XL (DITROPAN-XL) 10 MG 24 hr tablet, Take 1 tablet by mouth Daily., Disp: 90 tablet, Rfl: 1  •  Pseudoephedrine-Acetaminophen (SINUS PO), Take 1 tablet by mouth Daily., Disp: , Rfl:     OBJECTIVE:  /80 (BP Location: Left arm, Patient Position: Sitting, Cuff Size: Adult)   Pulse 54   Temp 98 °F (36.7 °C)   Resp 16   Ht 160 cm (63\")   Wt 123 kg (271 lb 8 oz)   SpO2 98%   Breastfeeding No   BMI 48.09 kg/m²    Physical Exam  Constitutional:       General: She is not in acute distress.  Cardiovascular:      Rate and Rhythm: Normal rate and regular rhythm.      Heart sounds: " Normal heart sounds. No murmur heard.     Pulmonary:      Effort: Pulmonary effort is normal.      Breath sounds: Normal breath sounds. No wheezing.   Neurological:      Mental Status: She is alert and oriented to person, place, and time.      Gait: Gait normal.   Psychiatric:         Mood and Affect: Mood normal.         Behavior: Behavior normal.         Assessment/Plan     Diagnoses and all orders for this visit:    1. Essential hypertension (Primary)  Well controlled, BP goal for age is <140/90 per JNC 8 guidelines and continue current medications    2. Recurrent major depressive disorder, in full remission (CMS/[facility])  Well controlled on Lexapro.    3. Stage 3b chronic kidney disease (CMS/[facility])  -     CBC (No Diff); Future  -     Urinalysis With Microscopic If Indicated (No Culture) - Urine, Clean Catch; Future  -     Protein / Creatinine Ratio, Urine - Urine, Clean Catch; Future  Continue RAAS blockade without urinary changes. Labs to evaluate.     4. Impaired glucose tolerance  -     Hemoglobin A1c; Future  A1c to monitor.     5. Class 3 severe obesity due to excess calories with serious comorbidity and body mass index (BMI) of 45.0 to 49.9 in adult (CMS/[facility])  Recommended attention to portion control and being careful about the types and timing of meals for the purpose of weight management.    6. Annual visit for general adult medical examination with abnormal findings  -     Comprehensive Metabolic Panel; Future  -     Lipid Panel; Future    An After Visit Summary was printed and given to the patient at discharge.  Return in about 6 months (around 12/8/2021) for Medicare Wellness.          Michael Singleton D.O. 6/8/2021   Electronically signed.

## 2021-06-30 DIAGNOSIS — D47.2 MGUS (MONOCLONAL GAMMOPATHY OF UNKNOWN SIGNIFICANCE): Primary | ICD-10-CM

## 2021-07-01 NOTE — PROGRESS NOTES
Progress Note      Pt Name: Analia Zuñiga  YOB: 1950  MRN: [id number]    Date of evaluation: 7/2/2021  History Obtained From:  patient, electronic medical record    CHIEF COMPLAINT:    Chief Complaint   Patient presents with    Follow-up     MGUS (monoclonal gammopathy of unknown significance)     Current active problems  IgM MGUS    HISTORY OF PRESENT ILLNESS:    Analia Zuñiga is a 80 y.o.  female with IgM MGUS followed in the office since 12/15/2016. She continues to be monitored with observation with level stable. She also has some mild eosinophilia that is felt to be reactivedue to her allergies. She is currently taking over-the-counter generic Claritin. She denies any new bone pain. She denies any hospitalization or transfusion since her last visit. She continues taking oxybutynin for her overactive bladder which is stable. She continues on Lexapro for anxiety which is stable. She continues taking allopurinol for hyperuricemia. She does take melatonin for her sleep at night. HEMATOLOGY HISTORY: IgMMGUS 1/23/2017  Jules Gates was seen in initial hematology consultation on 12/15/2016 referred by Dr. Yu Lyons for an abnormal SPEP. SEROLOGY 7/21/2016  SPEP - M spike of 0.4 g/dL   QI - IgM of 331 ()    SEROLOGY 11/7/2016  SPEP - M spike of 0.6 g/dL    Her initial CBC in the office reveals a WBC of 6.05, Hgb 13.1 with .4 and PLT of 196,000.      BASELINE SEROLOGY 12/15/2016  B12 - 701   Folate - 12   QI - IgM 285 () with normal IgA and IgG   Free serum light chains - kappa 77.62, lambda 33.92, K/L ratio 2.29   CMP - creatinine 1.2 with GFR 47.9 otherwise normal    BASELINE URINE STUDIES:  24-hour urine for immunoelectrophoresis and immunofixation returned on 12/19/2016 revealed no monoclonal protein    BONE SURVEY AT [facility] ON 12/15/2016 was negative for any lytic lesions    Bone marrow aspiration and biopsy performed in [facility] on 1/23/2017 · Normocellular (4050%) with trilineage hematopoiesis and no evidence of dysplasia nor increase in blasts. · There were adequate iron stores. · There was no diagnostic evidence of plasma cell dyscrasia nor lymphoproliferative disorder. · Molecular testing for Bcell gene rearrangements were negative. · Cytogenetics were normal female karyotype. CTs of the soft tissue neck, chest, abdomen was performed on 2/9/2017 at [facility] which were unrevealing for any lymphadenopathy or suspicious lesions for lymphoplasmacytic lymphoma. There were 2 lesions noted in the right kidney that were most likely cyst with an ultrasound of the kidney performed on 2/16/2017 revealing these to be simple cysts. With CT scans being unrevealing her final diagnosis is that of an IgM MGUS. She will be followed up in the office for physical examinations and serologic testing periodically. With stable serology, frequency a followup will be decreased. TUMOR HISTORY: 7 mm RML nodule  Baseline CT scans without IV contrast were performed at [facility] for evaluation of her monoclonal gammopathy. The CT of the chest without contrast on 2/9/2017 revealed a 7 mm nodule in the RML. Follow-up CT of the chest without contrast was performed at [facility] on 5/25/2017 and compared to 2/9/2017. There was a 7 mm nodular density in the RML which appears to be an area of fibrosis. The 4 mm nodule in the RLL is stable and recommendations is to follow-up again in 6 months. CT of the chest on 12/4/2017 and compared to the prior study of 2/25/2017 which revealed a stable 5 mm groundglass nodule in the RLL. This had not changed since May 2017 with minimal increase in size since February 2008. Based on the size of the nodule and the Fleischner Society recommendations no further repeat CT scanning is required.         Past Medical History:   Diagnosis Date    Anxiety     Depression     Diabetes mellitus ([address]     Hypertension     Hyperuricemia     Kidney failure     Renal cyst, right     Seasonal allergies         Past Surgical History:   Procedure Laterality Date    BONE MARROW BIOPSY N/A 1/23/2017    BONE MARROW ASPIRATION BIOPSY performed by Estela Harris MD at [address] [place] [address] CATARACT REMOVAL Bilateral 2016    [facility]           Current Outpatient Medications:     allopurinol (ZYLOPRIM) 100 MG tablet, Take 100 mg by mouth daily, Disp: , Rfl:     escitalopram (LEXAPRO) 10 MG tablet, Take 10 mg by mouth daily, Disp: , Rfl:     melatonin 5 MG TABS tablet, Take 2.5-10 mg by mouth nightly as needed, Disp: , Rfl:     lisinopril (PRINIVIL;ZESTRIL) 20 MG tablet, Take 20 mg by mouth daily, Disp: , Rfl:     oxybutynin (DITROPAN) 5 MG tablet, Take 5 mg by mouth daily, Disp: , Rfl:     Multiple Vitamins-Minerals (THERAPEUTIC MULTIVITAMIN-MINERALS) tablet, Take 1 tablet by mouth daily, Disp: , Rfl:      Allergies   Allergen Reactions    Neosporin [Neomycin-Polymyxin-Gramicidin] Dermatitis    Neomycin-Bacitracin Zn-Polymyx Rash    Penicillins Rash       Social History     Tobacco Use    Smoking status: Never Smoker    Smokeless tobacco: Never Used   Substance Use Topics    Alcohol use: Not Currently    Drug use: Never       Family History   Problem Relation Age of Onset    Other Mother         Lymphoma    Cancer Father         Leukemia       Subjective   REVIEW OF SYSTEMS:   Review of Systems   Constitutional: Negative for chills, diaphoresis, fatigue, fever and unexpected weight change. HENT: Negative for mouth sores, nosebleeds, sore throat, trouble swallowing and voice change. Eyes: Negative for photophobia, discharge and itching. Respiratory: Negative for cough, shortness of breath and wheezing. Cardiovascular: Negative for chest pain, palpitations and leg swelling.    Gastrointestinal: Negative for abdominal distention, abdominal pain, blood in stool, constipation, diarrhea, nausea and vomiting. Endocrine: Negative for cold intolerance, heat intolerance, polydipsia and polyuria. Genitourinary: Negative for difficulty urinating, dysuria, hematuria and urgency. Musculoskeletal: Positive for arthralgias. Negative for back pain, joint swelling and myalgias. Skin: Negative for color change and rash. Allergic/Immunologic: Positive for environmental allergies (Pollen). Neurological: Negative for dizziness, tremors, seizures, syncope and light-headedness. Hematological: Negative for adenopathy. Does not bruise/bleed easily. Psychiatric/Behavioral: Negative for behavioral problems and suicidal ideas. The patient is not nervous/anxious. Objective   BP (!) 142/70   Pulse 77   Ht 5' 3\" (1.6 m)   Wt 269 lb (122 kg)   SpO2 96%   BMI 47.65 kg/m²     PHYSICAL EXAM:  Physical Exam  Constitutional:       Appearance: She is well-developed. She is obese. HENT:      Head: Normocephalic and atraumatic. Eyes:      General: No scleral icterus. Conjunctiva/sclera: Conjunctivae normal.   Neck:      Trachea: No tracheal deviation. Cardiovascular:      Rate and Rhythm: Normal rate and regular rhythm. Heart sounds: Normal heart sounds. No murmur heard. Pulmonary:      Effort: Pulmonary effort is normal. No respiratory distress. Breath sounds: Normal breath sounds. Abdominal:      General: Bowel sounds are normal. There is no distension. Palpations: Abdomen is soft. Tenderness: There is no abdominal tenderness. Musculoskeletal:         General: No tenderness. Cervical back: Normal range of motion and neck supple. Comments: Full ROM in all 4 extremities   Skin:     General: Skin is warm and dry. Findings: No rash. Neurological:      Mental Status: She is alert and oriented to person, place, and time.       Coordination: Coordination normal.   Psychiatric:         Behavior: Behavior normal.         Thought Content: Thought content normal.         CBC reviewed by me  Lab Results   Component Value Date    WBC 7.36 12/18/2020    HGB 12.9 12/18/2020    HCT 39.4 12/18/2020    .9 (H) 12/18/2020     (L) 12/18/2020         VISIT DIAGNOSES  1. MGUS (monoclonal gammopathy of unknown significance)    2. Thrombocytopenia ([address]    3. Eosinophilia, unspecified type    4. Encounter for screening mammogram for malignant neoplasm of breast        ASSESSMENT/PLAN:    1. IgM MGUS. Serology 1/24/2019  Crt - 1.49 with GFR down improved to 36  Ca - WNL - 9.5  QI - IgM 316 - stable  SPEP - M spike of 0.4 g/dL - stable  Free serum light chains - kappa 96.2, lambda 32.9, K/L ratio 2.92 - all stable    Serology 2/28/2020  QI -IgG 1325, IgA 266, IgM 414 ()  SPEP - M spike 0.4 g/dL with immunofixation revealing IgM kappa  Free serum light chains - kappa 129.6, lambda 48.1, K/L ratio 2.69    Serology 6/26/2020  QI - IgG 1260, IgA 248, IgM 392  SPEP - M spike 0.6 g/dL with immunofixation revealing IgM kappa  Free serum light chains - kappa 143.5, lambda 42.7, K/L ratio 3.36  CMP - crt 1.6 with GFR 32    Serology 12/18/2020  QI - IgG 1270, IgA 245, IgM 390  SPEP - M spike 0.5 g/dL with immunofixation revealing IgM kappa  Free serum light chains - kappa 140.4, lambda 50.6, K/L ratio 2.77  B2M - 5.3  CMP - crt 1.5 with GFR 34, Ca 10.1      C - ca 10.1  R - crt 1.5 with GFR 34 followed by Dr. Katie Grimm  A - HGB   B - no bone pain or lesions         Protein serology have been stable and will be rechecked today. 2.  Mild thrombocytopenia    Serology 5/26/2020  B12 = 540  Folate - 24.1    ,000 which is decreased slightly further. Her B12 and folate levels were normal.  MCV is 98.8. I am going to continue to monitor her platelets. She does not have any progressive bruising or bleeding. 2.  Eosinophilia. WBC 7.24 with 13% eosinophils with an absolute eosinophil count of 0.94 on 2/28/2020.   Today her eosinophil percentage is 7.4% with an absolute eosinophil count of 0.63. She continues taking her generic Claritin. IgE - 28 (6  495)          · Breast cancer screening. Bilateral screening mamm[facility]/6/2020 [facility] was BI-RADS 1. Claritza Byrne agrees to have mammograms arranged. · Colon cancer screening. Her colonoscopy is up-to-date and will not be due again until 2026    · Cervical cancer screening. Pap smear 7/12/2019 was negative. Immunization History   Administered Date(s) Administered    COVID-19, Moderna, PF, 100mcg/0.5mL 03/11/2021, 04/08/2021         Orders Placed This Encounter   Procedures    FELIX DIGITAL SCREEN W OR WO CAD BILATERAL    Electrophoresis Protein, Serum with Reflex to Immunofixation    Spray/Lambda Free Lt Chains, Serum Quant    Beta 2 Microglobulin, Serum    Lactate Dehydrogenase    Comprehensive Metabolic Panel        Return in about 6 months (around 1/2/2022) for With [facility].      Derik Infante PA-C  9:53 AM  7/2/2021

## 2021-07-02 ENCOUNTER — HOSPITAL ENCOUNTER (OUTPATIENT)
Dept: INFUSION THERAPY | Age: 71
Discharge: HOME | End: 2021-07-02
Payer: MEDICARE

## 2021-07-02 ENCOUNTER — OFFICE VISIT (OUTPATIENT)
Dept: HEMATOLOGY | Age: 71
End: 2021-07-02
Payer: MEDICARE

## 2021-07-02 ENCOUNTER — TRANSCRIBE ORDERS (OUTPATIENT)
Dept: ADMINISTRATIVE | Facility: HOSPITAL | Age: 71
End: 2021-07-02

## 2021-07-02 VITALS
HEART RATE: 77 BPM | HEIGHT: 63 IN | DIASTOLIC BLOOD PRESSURE: 70 MMHG | BODY MASS INDEX: 47.66 KG/M2 | SYSTOLIC BLOOD PRESSURE: 142 MMHG | OXYGEN SATURATION: 96 % | WEIGHT: 269 LBS

## 2021-07-02 DIAGNOSIS — D47.2 MGUS (MONOCLONAL GAMMOPATHY OF UNKNOWN SIGNIFICANCE): Primary | ICD-10-CM

## 2021-07-02 DIAGNOSIS — D47.2 MGUS (MONOCLONAL GAMMOPATHY OF UNKNOWN SIGNIFICANCE): ICD-10-CM

## 2021-07-02 DIAGNOSIS — D69.6 THROMBOCYTOPENIA (HCC): ICD-10-CM

## 2021-07-02 DIAGNOSIS — Z12.31 ENCOUNTER FOR SCREENING MAMMOGRAM FOR MALIGNANT NEOPLASM OF BREAST: ICD-10-CM

## 2021-07-02 DIAGNOSIS — D72.10 EOSINOPHILIA, UNSPECIFIED TYPE: ICD-10-CM

## 2021-07-02 DIAGNOSIS — Z12.31 ENCOUNTER FOR SCREENING MAMMOGRAM FOR MALIGNANT NEOPLASM OF BREAST: Primary | ICD-10-CM

## 2021-07-02 LAB
ALBUMIN SERPL-MCNC: 4.3 G/DL (ref 3.5–5.2)
ALP BLD-CCNC: 109 U/L (ref 35–104)
ALT SERPL-CCNC: 17 U/L (ref 9–52)
ANION GAP SERPL CALCULATED.3IONS-SCNC: 11 MMOL/L (ref 7–19)
AST SERPL-CCNC: 26 U/L (ref 14–36)
BASOPHILS ABSOLUTE: 0.03 K/UL (ref 0.01–0.08)
BASOPHILS RELATIVE PERCENT: 0.4 % (ref 0.1–1.2)
BILIRUB SERPL-MCNC: 0.7 MG/DL (ref 0.2–1.3)
BUN BLDV-MCNC: 33 MG/DL (ref 7–17)
CALCIUM SERPL-MCNC: 9.6 MG/DL (ref 8.4–10.2)
CHLORIDE BLD-SCNC: 102 MMOL/L (ref 98–111)
CO2: 25 MMOL/L (ref 22–29)
CREAT SERPL-MCNC: 1.7 MG/DL (ref 0.5–1)
EOSINOPHILS ABSOLUTE: 0.63 K/UL (ref 0.04–0.54)
EOSINOPHILS RELATIVE PERCENT: 7.4 % (ref 0.7–7)
GFR NON-AFRICAN AMERICAN: 30
GLOBULIN: 3.2 G/DL
GLUCOSE BLD-MCNC: 128 MG/DL (ref 74–106)
HCT VFR BLD CALC: 41.9 % (ref 34.1–44.9)
HEMOGLOBIN: 13.5 G/DL (ref 11.2–15.7)
LACTATE DEHYDROGENASE: 472 U/L (ref 313–618)
LYMPHOCYTES ABSOLUTE: 2.07 K/UL (ref 1.18–3.74)
LYMPHOCYTES RELATIVE PERCENT: 24.2 % (ref 19.3–53.1)
MCH RBC QN AUTO: 31.8 PG (ref 25.6–32.2)
MCHC RBC AUTO-ENTMCNC: 32.2 G/DL (ref 32.3–35.5)
MCV RBC AUTO: 98.8 FL (ref 79.4–94.8)
MONOCYTES ABSOLUTE: 1.04 K/UL (ref 0.24–0.82)
MONOCYTES RELATIVE PERCENT: 12.1 % (ref 4.7–12.5)
NEUTROPHILS ABSOLUTE: 4.8 K/UL (ref 1.56–6.13)
NEUTROPHILS RELATIVE PERCENT: 55.9 % (ref 34–71.1)
PDW BLD-RTO: 12.3 % (ref 11.7–14.4)
PLATELET # BLD: 141 K/UL (ref 182–369)
PMV BLD AUTO: 11.1 FL (ref 7.4–10.4)
POTASSIUM SERPL-SCNC: 4.3 MMOL/L (ref 3.5–5.1)
RBC # BLD: 4.24 M/UL (ref 3.93–5.22)
SODIUM BLD-SCNC: 138 MMOL/L (ref 137–145)
TOTAL PROTEIN: 7.5 G/DL (ref 6.3–8.2)
WBC # BLD: 8.57 K/UL (ref 3.98–10.04)

## 2021-07-02 PROCEDURE — G8400 PT W/DXA NO RESULTS DOC: HCPCS | Performed by: PHYSICIAN ASSISTANT

## 2021-07-02 PROCEDURE — 4040F PNEUMOC VAC/ADMIN/RCVD: CPT | Performed by: PHYSICIAN ASSISTANT

## 2021-07-02 PROCEDURE — 1036F TOBACCO NON-USER: CPT | Performed by: PHYSICIAN ASSISTANT

## 2021-07-02 PROCEDURE — G8427 DOCREV CUR MEDS BY ELIG CLIN: HCPCS | Performed by: PHYSICIAN ASSISTANT

## 2021-07-02 PROCEDURE — 99213 OFFICE O/P EST LOW 20 MIN: CPT | Performed by: PHYSICIAN ASSISTANT

## 2021-07-02 PROCEDURE — 99213 OFFICE O/P EST LOW 20 MIN: CPT

## 2021-07-02 PROCEDURE — G8417 CALC BMI ABV UP PARAM F/U: HCPCS | Performed by: PHYSICIAN ASSISTANT

## 2021-07-02 PROCEDURE — 85025 COMPLETE CBC W/AUTO DIFF WBC: CPT

## 2021-07-02 PROCEDURE — 1123F ACP DISCUSS/DSCN MKR DOCD: CPT | Performed by: PHYSICIAN ASSISTANT

## 2021-07-02 PROCEDURE — 3017F COLORECTAL CA SCREEN DOC REV: CPT | Performed by: PHYSICIAN ASSISTANT

## 2021-07-02 PROCEDURE — 80053 COMPREHEN METABOLIC PANEL: CPT

## 2021-07-02 PROCEDURE — 83615 LACTATE (LD) (LDH) ENZYME: CPT

## 2021-07-02 PROCEDURE — 1090F PRES/ABSN URINE INCON ASSESS: CPT | Performed by: PHYSICIAN ASSISTANT

## 2021-07-02 ASSESSMENT — ENCOUNTER SYMPTOMS
ABDOMINAL DISTENTION: 0
VOMITING: 0
SORE THROAT: 0
COLOR CHANGE: 0
SHORTNESS OF BREATH: 0
BACK PAIN: 0
EYE DISCHARGE: 0
VOICE CHANGE: 0
PHOTOPHOBIA: 0
CONSTIPATION: 0
COUGH: 0
NAUSEA: 0
EYE ITCHING: 0
DIARRHEA: 0
TROUBLE SWALLOWING: 0
BLOOD IN STOOL: 0
ABDOMINAL PAIN: 0
WHEEZING: 0

## 2021-07-13 ENCOUNTER — APPOINTMENT (OUTPATIENT)
Dept: MAMMOGRAPHY | Facility: HOSPITAL | Age: 71
End: 2021-07-13

## 2021-07-20 ENCOUNTER — HOSPITAL ENCOUNTER (OUTPATIENT)
Dept: MAMMOGRAPHY | Facility: HOSPITAL | Age: 71
Discharge: HOME | End: 2021-07-20
Admitting: PHYSICIAN ASSISTANT

## 2021-07-20 DIAGNOSIS — Z12.31 ENCOUNTER FOR SCREENING MAMMOGRAM FOR MALIGNANT NEOPLASM OF BREAST: ICD-10-CM

## 2021-07-20 PROCEDURE — 77067 SCR MAMMO BI INCL CAD: CPT

## 2021-07-20 PROCEDURE — 77063 BREAST TOMOSYNTHESIS BI: CPT

## 2021-10-19 DIAGNOSIS — N32.81 OVERACTIVE BLADDER: ICD-10-CM

## 2021-10-19 DIAGNOSIS — N39.41 URGE INCONTINENCE: ICD-10-CM

## 2021-10-19 RX ORDER — OXYBUTYNIN CHLORIDE 10 MG/1
10 TABLET, EXTENDED RELEASE ORAL DAILY
Qty: 90 TABLET | Refills: 1 | Status: SHIPPED | OUTPATIENT
Start: 2021-10-19 | End: 2022-04-29 | Stop reason: SDUPTHER

## 2021-11-10 ENCOUNTER — TRANSCRIBE ORDERS (OUTPATIENT)
Dept: GENERAL RADIOLOGY | Facility: HOSPITAL | Age: 71
End: 2021-11-10

## 2021-11-10 ENCOUNTER — HOSPITAL ENCOUNTER (OUTPATIENT)
Dept: GENERAL RADIOLOGY | Facility: HOSPITAL | Age: 71
Discharge: HOME | End: 2021-11-10
Admitting: INTERNAL MEDICINE

## 2021-11-10 DIAGNOSIS — M19.011 ARTHRITIS OF RIGHT ACROMIOCLAVICULAR JOINT: ICD-10-CM

## 2021-11-10 DIAGNOSIS — M19.011 ARTHRITIS OF RIGHT ACROMIOCLAVICULAR JOINT: Primary | ICD-10-CM

## 2021-11-10 PROCEDURE — 73030 X-RAY EXAM OF SHOULDER: CPT

## 2021-12-09 ENCOUNTER — HOSPITAL ENCOUNTER (OUTPATIENT)
Dept: GENERAL RADIOLOGY | Facility: HOSPITAL | Age: 71
Discharge: HOME | End: 2021-12-09
Admitting: NURSE PRACTITIONER

## 2021-12-09 ENCOUNTER — OFFICE VISIT (OUTPATIENT)
Dept: INTERNAL MEDICINE | Facility: CLINIC | Age: 71
End: 2021-12-09

## 2021-12-09 VITALS
RESPIRATION RATE: 16 BRPM | BODY MASS INDEX: 47.84 KG/M2 | DIASTOLIC BLOOD PRESSURE: 56 MMHG | WEIGHT: 270 LBS | HEIGHT: 63 IN | HEART RATE: 66 BPM | TEMPERATURE: 98.2 F | OXYGEN SATURATION: 97 % | SYSTOLIC BLOOD PRESSURE: 104 MMHG

## 2021-12-09 DIAGNOSIS — Z23 NEED FOR VACCINATION: ICD-10-CM

## 2021-12-09 DIAGNOSIS — I10 ESSENTIAL HYPERTENSION: ICD-10-CM

## 2021-12-09 DIAGNOSIS — N18.32 STAGE 3B CHRONIC KIDNEY DISEASE (HCC): ICD-10-CM

## 2021-12-09 DIAGNOSIS — E66.01 CLASS 3 SEVERE OBESITY DUE TO EXCESS CALORIES WITH SERIOUS COMORBIDITY AND BODY MASS INDEX (BMI) OF 45.0 TO 49.9 IN ADULT (HCC): ICD-10-CM

## 2021-12-09 DIAGNOSIS — R06.02 SHORTNESS OF BREATH: ICD-10-CM

## 2021-12-09 DIAGNOSIS — Z00.00 ENCOUNTER FOR MEDICARE ANNUAL WELLNESS EXAM: Primary | ICD-10-CM

## 2021-12-09 DIAGNOSIS — F33.42 RECURRENT MAJOR DEPRESSIVE DISORDER, IN FULL REMISSION (HCC): ICD-10-CM

## 2021-12-09 DIAGNOSIS — J90 PLEURAL EFFUSION ON RIGHT: ICD-10-CM

## 2021-12-09 DIAGNOSIS — I51.7 CARDIOMEGALY: ICD-10-CM

## 2021-12-09 PROCEDURE — G0439 PPPS, SUBSEQ VISIT: HCPCS | Performed by: NURSE PRACTITIONER

## 2021-12-09 PROCEDURE — 0003A COVID-19 (PFIZER): CPT | Performed by: NURSE PRACTITIONER

## 2021-12-09 PROCEDURE — 71046 X-RAY EXAM CHEST 2 VIEWS: CPT

## 2021-12-09 PROCEDURE — 99214 OFFICE O/P EST MOD 30 MIN: CPT | Performed by: NURSE PRACTITIONER

## 2021-12-09 PROCEDURE — 1170F FXNL STATUS ASSESSED: CPT | Performed by: NURSE PRACTITIONER

## 2021-12-09 PROCEDURE — 1160F RVW MEDS BY RX/DR IN RCRD: CPT | Performed by: NURSE PRACTITIONER

## 2021-12-09 PROCEDURE — 91300 COVID-19 (PFIZER): CPT | Performed by: NURSE PRACTITIONER

## 2021-12-09 NOTE — PATIENT INSTRUCTIONS
Medicare Wellness  Personal Prevention Plan of Service     Date of Office Visit:  2021  Encounter Provider:  ANY Dsouza  Place of Service:  [facility]  Patient Name: Sarah Littlejohn  :  1950    As part of the Medicare Wellness portion of your visit today, we are providing you with this personalized preventive plan of services (PPPS). This plan is based upon recommendations of the [place] Preventive Services Task Force (USPSTF) and the Advisory Committee on Immunization Practices (ACIP).    This lists the preventive care services that should be considered, and provides dates of when you are due. Items listed as completed are up-to-date and do not require any further intervention.    Health Maintenance   Topic Date Due   • TDAP/TD VACCINES (1 - Tdap) Never done   • ZOSTER VACCINE (1 of 2) Never done   • INFLUENZA VACCINE  2021   • COVID-19 Vaccine (3 - Booster for Moderna series) 10/08/2021   • ANNUAL WELLNESS VISIT  2021   • DXA SCAN  2022   • PAP SMEAR  07/15/2022   • MAMMOGRAM  2023   • COLORECTAL CANCER SCREENING  2026   • HEPATITIS C SCREENING  Completed   • Pneumococcal Vaccine 65+  Completed   • HEMOGLOBIN A1C  Discontinued       Orders Placed This Encounter   Procedures   • XR Chest PA & Lateral     Standing Status:   Future     Standing Expiration Date:   2022     Order Specific Question:   Reason for Exam:     Answer:   shortness of breath     Order Specific Question:   Does this patient have a diabetic monitoring/medication delivering device on?     Answer:   No     Order Specific Question:   Release to patient     Answer:   Immediate   • COVID-19 Vaccine (Pfizer)       Return in about 6 months (around 2022).

## 2021-12-09 NOTE — PROGRESS NOTES
The ABCs of the Annual Wellness Visit  Subsequent Medicare Wellness Visit    Chief Complaint   Patient presents with   • Medicare Wellness-subsequent   • Shoulder Pain     right, radiating down right side, with some SOB   • Hypertension      Subjective    History of Present Illness:  Sarah Littlejohn is a 71 y.o. female who presents for a Subsequent Medicare Wellness Visit.  See associated note     The following portions of the patient's history were reviewed and   updated as appropriate: allergies, current medications, past family history, past medical history, past social history, past surgical history and problem list.    Compared to one year ago, the patient feels her physical   health is the same.    Compared to one year ago, the patient feels her mental   health is the same.    Recent Hospitalizations:  She was not admitted to the hospital during the last year.       Current Medical Providers:  Patient Care Team:  Michael Singleton DO as PCP - General (Internal Medicine)  Dewayne Santiago MD as Consulting Physician (Nephrology)  Jose Zeng MD as Consulting Physician (Oncology)  Tone, Marek Bravo MD as Consulting Physician (Dermatology)  Hannah Marvin MD as Consulting Physician (Ophthalmology)    Outpatient Medications Prior to Visit   Medication Sig Dispense Refill   • allopurinol (ZYLOPRIM) 100 MG tablet Take 100 mg by mouth Daily.     • escitalopram (LEXAPRO) 10 MG tablet TAKE 1 TABLET BY MOUTH DAILY 90 tablet 3   • lisinopril-hydrochlorothiazide (PRINZIDE,ZESTORETIC) 20-12.5 MG per tablet TAKE 1 TABLET BY MOUTH TWICE A  tablet 2   • melatonin 5 MG tablet tablet Take 0.5-2 tablets by mouth At Night As Needed (sleep). 60 each 1   • multivitamin with minerals tablet tablet Take 1 tablet by mouth Daily.     • oxybutynin XL (DITROPAN-XL) 10 MG 24 hr tablet Take 1 tablet by mouth Daily. 90 tablet 1   • Pseudoephedrine-Acetaminophen (SINUS PO) Take 1 tablet by mouth  "Daily.       No facility-administered medications prior to visit.       No opioid medication identified on active medication list. I have reviewed chart for other potential  high risk medication/s and harmful drug interactions in the elderly.          Aspirin is not on active medication list.  Aspirin use is not indicated based on review of current medical condition/s. Risk of harm outweighs potential benefits.  .    Patient Active Problem List   Diagnosis   • Essential hypertension   • Class 3 severe obesity due to excess calories with serious comorbidity and body mass index (BMI) of 45.0 to 49.9 in adult ([facility])   • Stage 3b chronic kidney disease (CMS/[facility])   • Recurrent major depressive disorder, in full remission ([facility])   • MGUS (monoclonal gammopathy of unknown significance)   • Impaired glucose tolerance   • Urge incontinence   • Asymptomatic hyperuricemia   • SOB (shortness of breath) on exertion     Advance Care Planning  Advance Directive is not on file.  ACP discussion was held with the patient during this visit. Patient has an advance directive (not in EMR), copy requested.     Review of systems  See associated note      Objective    Vitals:    12/09/21 0907   BP: 104/56   BP Location: Left arm   Patient Position: Sitting   Cuff Size: Large Adult   Pulse: 66   Resp: 16   Temp: 98.2 °F (36.8 °C)   TempSrc: Temporal   SpO2: 97%   Weight: 122 kg (270 lb)   Height: 160 cm (63\")     BMI Readings from Last 1 Encounters:   12/09/21 47.83 kg/m²   BMI is above normal parameters. Recommendations include: educational material    Does the patient have evidence of cognitive impairment? No    Physical Exam      see associated note       HEALTH RISK ASSESSMENT    Smoking Status:  Social History     Tobacco Use   Smoking Status Never Smoker   Smokeless Tobacco Never Used     Alcohol Consumption:  Social History     Substance and Sexual Activity   Alcohol Use No     Fall Risk Screen:    MARYSE Fall Risk Assessment was " completed, and patient is at LOW risk for falls.Assessment completed on:6/8/2021    Depression Screening:  PHQ-2/PHQ-9 Depression Screening 12/9/2021   Little interest or pleasure in doing things 0   Feeling down, depressed, or hopeless 0   Total Score 0       Health Habits and Functional and Cognitive Screening:  Functional & Cognitive Status 12/9/2021   Do you have difficulty preparing food and eating? No   Do you have difficulty bathing yourself, getting dressed or grooming yourself? No   Do you have difficulty using the toilet? No   Do you have difficulty moving around from place to place? No   Do you have trouble with steps or getting out of a bed or a chair? No   Current Diet Unhealthy Diet   Dental Exam Not up to date   Eye Exam Up to date   Exercise (times per week) 0 times per week   Current Exercises Include No Regular Exercise   Current Exercise Activities Include -   Do you need help using the phone?  No   Are you deaf or do you have serious difficulty hearing?  No   Do you need help with transportation? No   Do you need help shopping? No   Do you need help preparing meals?  No   Do you need help with housework?  No   Do you need help with laundry? No   Do you need help taking your medications? No   Do you need help managing money? No   Do you ever drive or ride in a car without wearing a seat belt? No   Have you felt unusual stress, anger or loneliness in the last month? No   Who do you live with? Spouse   If you need help, do you have trouble finding someone available to you? No   Have you been bothered in the last four weeks by sexual problems? No   Do you have difficulty concentrating, remembering or making decisions? No       Age-appropriate Screening Schedule:  Refer to the list below for future screening recommendations based on patient's age, sex and/or medical conditions. Orders for these recommended tests are listed in the plan section. The patient has been provided with a written  plan.    Health Maintenance   Topic Date Due   • TDAP/TD VACCINES (1 - Tdap) Never done   • ZOSTER VACCINE (1 of 2) Never done   • INFLUENZA VACCINE  08/01/2021   • DXA SCAN  06/24/2022   • PAP SMEAR  07/15/2022   • MAMMOGRAM  07/20/2023   • HEMOGLOBIN A1C  Discontinued              Assessment/Plan   CMS Preventative Services Quick Reference  Risk Factors Identified During Encounter  Depression/Dysphoria  Immunizations Discussed/Encouraged (specific Immunizations; Influenza and COVID19  Obesity/Overweight   The above risks/problems have been discussed with the patient.  Follow up actions/plans if indicated are seen below in the Assessment/Plan Section.  Pertinent information has been shared with the patient in the After Visit Summary.    Diagnoses and all orders for this visit:    1. Encounter for Medicare annual wellness exam (Primary)    2. Need for vaccination  -     Cancel: Fluzone High-Dose 65+yrs ([id number])  -     COVID-19 Vaccine (Pfizer)    3. Essential hypertension    4. Class 3 severe obesity due to excess calories with serious comorbidity and body mass index (BMI) of 45.0 to 49.9 in adult ([facility])    5. Stage 3b chronic kidney disease (CMS/[facility])    6. Recurrent major depressive disorder, in full remission ([facility])    7. Shortness of breath  -     XR Chest PA & Lateral; Future  -     BNP; Future  -     Adult Transthoracic Echo Complete W/ Cont if Necessary Per Protocol; Future  -     CT Chest Without Contrast; Future    8. Cardiomegaly  -     BNP; Future  -     Adult Transthoracic Echo Complete W/ Cont if Necessary Per Protocol; Future    9. Pleural effusion on right  -     CT Chest Without Contrast; Future        Follow Up:   Return in about 6 months (around 6/9/2022).     An After Visit Summary and PPPS were made available to the patient.

## 2021-12-09 NOTE — PROGRESS NOTES
"Chief Complaint   Patient presents with   • Medicare Wellness-subsequent   • Shoulder Pain     right, radiating down right side, with some SOB   • Hypertension       History:  Sarah Littlejohn is a 71 y.o. female who presents today for follow-up for evaluation of the above:    HPI     Patient reports that about a month ago she experienced sudden onset Right shoulder pain. Was [facility] at [facility] with steroid ap. Pain did not improve so she returned and had xray of her right shoulder, was told she has arthritis and was given cortisone injection. She reports she continues to have SOB and this was present at onset  Started at the beginning of November. Shoulder pain has improved but continues to have SOB.   She denies any recent URI        ROS:  Review of Systems   Constitutional: Negative for fatigue and unexpected weight change.   HENT: Negative.    Eyes: Negative.    Respiratory: Positive for shortness of breath.    Cardiovascular: Negative.    Gastrointestinal: Negative for abdominal pain, constipation and diarrhea.   Endocrine: Negative.    Genitourinary: Negative for difficulty urinating, dyspareunia, genital sores, menstrual problem, pelvic pain, vaginal bleeding, vaginal discharge and vaginal pain.   Musculoskeletal: Positive for myalgias.   Skin: Negative.    Neurological: Negative.    Psychiatric/Behavioral: Negative.        Ms. Littlejohn  reports that she has never smoked. She has never used smokeless tobacco. She reports that she does not drink alcohol and does not use drugs.      Current Outpatient Medications:   •  allopurinol (ZYLOPRIM) 100 MG tablet, Take 100 mg by mouth Daily., Disp: , Rfl:   •  escitalopram (LEXAPRO) 10 MG tablet, TAKE 1 TABLET BY MOUTH DAILY, Disp: 90 tablet, Rfl: 3  •  lisinopril-hydrochlorothiazide (PRINZIDE,ZESTORETIC) 20-12.5 MG per tablet, TAKE 1 TABLET BY MOUTH TWICE A DAY, Disp: 180 tablet, Rfl: 2  •  melatonin 5 MG tablet tablet, Take " "0.5-2 tablets by mouth At Night As Needed (sleep)., Disp: 60 each, Rfl: 1  •  multivitamin with minerals tablet tablet, Take 1 tablet by mouth Daily., Disp: , Rfl:   •  oxybutynin XL (DITROPAN-XL) 10 MG 24 hr tablet, Take 1 tablet by mouth Daily., Disp: 90 tablet, Rfl: 1  •  Pseudoephedrine-Acetaminophen (SINUS PO), Take 1 tablet by mouth Daily., Disp: , Rfl:       OBJECTIVE:  /56 (BP Location: Left arm, Patient Position: Sitting, Cuff Size: Large Adult)   Pulse 66   Temp 98.2 °F (36.8 °C) (Temporal)   Resp 16   Ht 160 cm (63\")   Wt 122 kg (270 lb)   SpO2 97%   BMI 47.83 kg/m²    Physical Exam  Constitutional:       Appearance: She is not ill-appearing.   Cardiovascular:      Heart sounds: Normal heart sounds.   Pulmonary:      Breath sounds: Examination of the right-lower field reveals rhonchi. Rhonchi present.   Neurological:      Mental Status: She is oriented to person, place, and time.   Psychiatric:         Behavior: Behavior normal.         Assessment/Plan    Diagnoses and all orders for this visit:    1. Encounter for Medicare annual wellness exam (Primary)  See associated note     2. Need for vaccination  -     Cancel: Fluzone High-Dose 65+yrs ([id number])  -     COVID-19 Vaccine (Pfizer)    3. Essential hypertension  Well controlled, BP goal for age is <140/90 per JNC 8 guidelines and continue current medications    4. Class 3 severe obesity due to excess calories with serious comorbidity and body mass index (BMI) of 45.0 to 49.9 in adult ([facility])  Recommended attention to portion control and being careful about the types and timing of meals for the purpose of weight management.    5. Stage 3b chronic kidney disease (CMS/HCC)  SOB for past month. Not improving    6. Recurrent major depressive disorder, in full remission ([facility])  stable    7. Shortness of breath  -     XR Chest PA & Lateral; Future  -     BNP; Future  -     Adult Transthoracic Echo Complete W/ Cont if Necessary Per Protocol; " Future  -     CT Chest Without Contrast; Future  Xray is available to me at completion of this note and is showing pleural effusion in RLL. Will further evaluate.   Consider SEAN and sleep study with over night oxymetry.   8. Cardiomegaly  -     BNP; Future  -     Adult Transthoracic Echo Complete W/ Cont if Necessary Per Protocol; Future    9. Pleural effusion on right  -     CT Chest Without Contrast; Future         An After Visit Summary was printed and given to the patient at discharge.  Return in about 6 months (around 6/9/2022). Sooner if problems arise.          Christi AGARWAL. 12/9/2021   Electronically Signed

## 2021-12-13 ENCOUNTER — LAB (OUTPATIENT)
Dept: LAB | Facility: HOSPITAL | Age: 71
End: 2021-12-13

## 2021-12-13 ENCOUNTER — HOSPITAL ENCOUNTER (OUTPATIENT)
Dept: CT IMAGING | Facility: HOSPITAL | Age: 71
Discharge: HOME | End: 2021-12-13

## 2021-12-13 ENCOUNTER — HOSPITAL ENCOUNTER (OUTPATIENT)
Dept: CARDIOLOGY | Facility: HOSPITAL | Age: 71
Discharge: HOME | End: 2021-12-13

## 2021-12-13 VITALS
BODY MASS INDEX: 47.84 KG/M2 | HEIGHT: 63 IN | WEIGHT: 270 LBS | DIASTOLIC BLOOD PRESSURE: 62 MMHG | SYSTOLIC BLOOD PRESSURE: 175 MMHG

## 2021-12-13 DIAGNOSIS — R06.02 SHORTNESS OF BREATH: ICD-10-CM

## 2021-12-13 DIAGNOSIS — I51.7 CARDIOMEGALY: ICD-10-CM

## 2021-12-13 DIAGNOSIS — J90 PLEURAL EFFUSION ON RIGHT: ICD-10-CM

## 2021-12-13 LAB
BH CV ECHO MEAS - AO MAX PG (FULL): 2.9 MMHG
BH CV ECHO MEAS - AO MAX PG: 7.4 MMHG
BH CV ECHO MEAS - AO MEAN PG (FULL): 1 MMHG
BH CV ECHO MEAS - AO MEAN PG: 4 MMHG
BH CV ECHO MEAS - AO ROOT AREA (BSA CORRECTED): 1.4
BH CV ECHO MEAS - AO ROOT AREA: 7.1 CM^2
BH CV ECHO MEAS - AO ROOT DIAM: 3 CM
BH CV ECHO MEAS - AO V2 MAX: 136 CM/SEC
BH CV ECHO MEAS - AO V2 MEAN: 94.4 CM/SEC
BH CV ECHO MEAS - AO V2 VTI: 35.6 CM
BH CV ECHO MEAS - AVA(I,A): 2.3 CM^2
BH CV ECHO MEAS - AVA(I,D): 2.3 CM^2
BH CV ECHO MEAS - AVA(V,A): 2.2 CM^2
BH CV ECHO MEAS - AVA(V,D): 2.2 CM^2
BH CV ECHO MEAS - BSA(HAYCOCK): 2.4 M^2
BH CV ECHO MEAS - BSA: 2.2 M^2
BH CV ECHO MEAS - BZI_BMI: 47.8 KILOGRAMS/M^2
BH CV ECHO MEAS - BZI_METRIC_HEIGHT: 160 CM
BH CV ECHO MEAS - BZI_METRIC_WEIGHT: 122.5 KG
BH CV ECHO MEAS - EDV(CUBED): 140.6 ML
BH CV ECHO MEAS - EDV(MOD-SP4): 78.9 ML
BH CV ECHO MEAS - EDV(TEICH): 129.5 ML
BH CV ECHO MEAS - EF(CUBED): 64 %
BH CV ECHO MEAS - EF(MOD-SP4): 64.4 %
BH CV ECHO MEAS - EF(TEICH): 55.1 %
BH CV ECHO MEAS - ESV(CUBED): 50.7 ML
BH CV ECHO MEAS - ESV(MOD-SP4): 28.1 ML
BH CV ECHO MEAS - ESV(TEICH): 58.1 ML
BH CV ECHO MEAS - FS: 28.8 %
BH CV ECHO MEAS - IVS/LVPW: 0.8
BH CV ECHO MEAS - IVSD: 1 CM
BH CV ECHO MEAS - LA DIMENSION: 3.7 CM
BH CV ECHO MEAS - LA/AO: 1.2
BH CV ECHO MEAS - LAT PEAK E' VEL: 7.7 CM/SEC
BH CV ECHO MEAS - LV DIASTOLIC VOL/BSA (35-75): 35.9 ML/M^2
BH CV ECHO MEAS - LV MASS(C)D: 236 GRAMS
BH CV ECHO MEAS - LV MASS(C)DI: 107.4 GRAMS/M^2
BH CV ECHO MEAS - LV MAX PG: 4.5 MMHG
BH CV ECHO MEAS - LV MEAN PG: 3 MMHG
BH CV ECHO MEAS - LV SYSTOLIC VOL/BSA (12-30): 12.8 ML/M^2
BH CV ECHO MEAS - LV V1 MAX: 106 CM/SEC
BH CV ECHO MEAS - LV V1 MEAN: 73.6 CM/SEC
BH CV ECHO MEAS - LV V1 VTI: 28.6 CM
BH CV ECHO MEAS - LVIDD: 5.2 CM
BH CV ECHO MEAS - LVIDS: 3.7 CM
BH CV ECHO MEAS - LVLD AP4: 7.4 CM
BH CV ECHO MEAS - LVLS AP4: 6.3 CM
BH CV ECHO MEAS - LVOT AREA (M): 2.8 CM^2
BH CV ECHO MEAS - LVOT AREA: 2.8 CM^2
BH CV ECHO MEAS - LVOT DIAM: 1.9 CM
BH CV ECHO MEAS - LVPWD: 1.3 CM
BH CV ECHO MEAS - MED PEAK E' VEL: 5.77 CM/SEC
BH CV ECHO MEAS - MV A MAX VEL: 112 CM/SEC
BH CV ECHO MEAS - MV DEC TIME: 0.12 SEC
BH CV ECHO MEAS - MV E MAX VEL: 71.3 CM/SEC
BH CV ECHO MEAS - MV E/A: 0.64
BH CV ECHO MEAS - SI(AO): 114.5 ML/M^2
BH CV ECHO MEAS - SI(CUBED): 40.9 ML/M^2
BH CV ECHO MEAS - SI(LVOT): 36.9 ML/M^2
BH CV ECHO MEAS - SI(MOD-SP4): 23.1 ML/M^2
BH CV ECHO MEAS - SI(TEICH): 32.5 ML/M^2
BH CV ECHO MEAS - SV(AO): 251.6 ML
BH CV ECHO MEAS - SV(CUBED): 90 ML
BH CV ECHO MEAS - SV(LVOT): 81.1 ML
BH CV ECHO MEAS - SV(MOD-SP4): 50.8 ML
BH CV ECHO MEAS - SV(TEICH): 71.4 ML
BH CV ECHO MEASUREMENTS AVERAGE E/E' RATIO: 10.59
LEFT ATRIUM VOLUME INDEX: 20.3 ML/M2
LEFT ATRIUM VOLUME: 44.7 CM3

## 2021-12-13 PROCEDURE — 83880 ASSAY OF NATRIURETIC PEPTIDE: CPT

## 2021-12-13 PROCEDURE — 71250 CT THORAX DX C-: CPT

## 2021-12-13 PROCEDURE — 82565 ASSAY OF CREATININE: CPT

## 2021-12-13 PROCEDURE — 36415 COLL VENOUS BLD VENIPUNCTURE: CPT

## 2021-12-13 PROCEDURE — 93306 TTE W/DOPPLER COMPLETE: CPT | Performed by: INTERNAL MEDICINE

## 2021-12-13 PROCEDURE — 93306 TTE W/DOPPLER COMPLETE: CPT

## 2021-12-14 LAB
CREAT BLDA-MCNC: 1.4 MG/DL (ref 0.6–1.3)
NT-PROBNP SERPL-MCNC: 115.7 PG/ML (ref 0–900)

## 2021-12-21 LAB
ALBUMIN SERPL-MCNC: 3.7 G/DL (ref 3.5–5.2)
ALP BLD-CCNC: 94 U/L (ref 35–104)
ALT SERPL-CCNC: 17 U/L (ref 5–33)
ANION GAP SERPL CALCULATED.3IONS-SCNC: 13 MMOL/L (ref 7–19)
AST SERPL-CCNC: 20 U/L (ref 5–32)
BACTERIA: ABNORMAL /HPF
BASOPHILS ABSOLUTE: 0 K/UL (ref 0–0.2)
BASOPHILS RELATIVE PERCENT: 0.3 % (ref 0–1)
BILIRUB SERPL-MCNC: 0.4 MG/DL (ref 0.2–1.2)
BILIRUBIN URINE: NEGATIVE
BLOOD, URINE: NEGATIVE
BUN BLDV-MCNC: 26 MG/DL (ref 8–23)
CALCIUM SERPL-MCNC: 9.7 MG/DL (ref 8.8–10.2)
CHLORIDE BLD-SCNC: 104 MMOL/L (ref 98–111)
CLARITY: CLEAR
CO2: 23 MMOL/L (ref 22–29)
COLOR: YELLOW
CREAT SERPL-MCNC: 1.4 MG/DL (ref 0.5–0.9)
CREATININE URINE: 132 MG/DL (ref 4.2–622)
EOSINOPHILS ABSOLUTE: 0.4 K/UL (ref 0–0.6)
EOSINOPHILS RELATIVE PERCENT: 3.9 % (ref 0–5)
EPITHELIAL CELLS, UA: ABNORMAL /HPF
GFR AFRICAN AMERICAN: 45
GFR NON-AFRICAN AMERICAN: 37
GLUCOSE BLD-MCNC: 111 MG/DL (ref 74–109)
GLUCOSE URINE: NEGATIVE MG/DL
HCT VFR BLD CALC: 39.6 % (ref 37–47)
HEMOGLOBIN: 12 G/DL (ref 12–16)
IMMATURE GRANULOCYTES #: 0.1 K/UL
KETONES, URINE: NEGATIVE MG/DL
LEUKOCYTE ESTERASE, URINE: ABNORMAL
LYMPHOCYTES ABSOLUTE: 1.8 K/UL (ref 1.1–4.5)
LYMPHOCYTES RELATIVE PERCENT: 20.7 % (ref 20–40)
MAGNESIUM: 1.7 MG/DL (ref 1.6–2.4)
MCH RBC QN AUTO: 30.6 PG (ref 27–31)
MCHC RBC AUTO-ENTMCNC: 30.3 G/DL (ref 33–37)
MCV RBC AUTO: 101 FL (ref 81–99)
MONOCYTES ABSOLUTE: 0.8 K/UL (ref 0–0.9)
MONOCYTES RELATIVE PERCENT: 9.1 % (ref 0–10)
NEUTROPHILS ABSOLUTE: 5.8 K/UL (ref 1.5–7.5)
NEUTROPHILS RELATIVE PERCENT: 65.3 % (ref 50–65)
NITRITE, URINE: NEGATIVE
PARATHYROID HORMONE INTACT: 36 PG/ML (ref 15–65)
PDW BLD-RTO: 13.1 % (ref 11.5–14.5)
PH UA: 6 (ref 5–8)
PHOSPHORUS: 2.8 MG/DL (ref 2.5–4.5)
PLATELET # BLD: 182 K/UL (ref 130–400)
PMV BLD AUTO: 11.8 FL (ref 9.4–12.3)
POTASSIUM SERPL-SCNC: 4.3 MMOL/L (ref 3.5–5)
PROTEIN PROTEIN: 15 MG/DL (ref 15–45)
PROTEIN UA: NEGATIVE MG/DL
RBC # BLD: 3.92 M/UL (ref 4.2–5.4)
SODIUM BLD-SCNC: 140 MMOL/L (ref 136–145)
SPECIFIC GRAVITY UA: 1.02 (ref 1–1.03)
TOTAL PROTEIN: 7.7 G/DL (ref 6.6–8.7)
URIC ACID, SERUM: 6.3 MG/DL (ref 2.4–5.7)
UROBILINOGEN, URINE: 0.2 E.U./DL
WBC # BLD: 8.9 K/UL (ref 4.8–10.8)

## 2021-12-30 ENCOUNTER — OFFICE VISIT (OUTPATIENT)
Dept: INTERNAL MEDICINE | Facility: CLINIC | Age: 71
End: 2021-12-30

## 2021-12-30 VITALS
WEIGHT: 266.9 LBS | RESPIRATION RATE: 16 BRPM | HEART RATE: 68 BPM | DIASTOLIC BLOOD PRESSURE: 70 MMHG | BODY MASS INDEX: 47.29 KG/M2 | HEIGHT: 63 IN | SYSTOLIC BLOOD PRESSURE: 130 MMHG | TEMPERATURE: 98.5 F | OXYGEN SATURATION: 98 %

## 2021-12-30 DIAGNOSIS — R07.81 PLEURITIC CHEST PAIN: ICD-10-CM

## 2021-12-30 DIAGNOSIS — F33.41 RECURRENT MAJOR DEPRESSIVE DISORDER, IN PARTIAL REMISSION (HCC): ICD-10-CM

## 2021-12-30 DIAGNOSIS — R06.2 WHEEZING: ICD-10-CM

## 2021-12-30 DIAGNOSIS — J90 RECURRENT RIGHT PLEURAL EFFUSION: ICD-10-CM

## 2021-12-30 DIAGNOSIS — I10 ESSENTIAL HYPERTENSION: ICD-10-CM

## 2021-12-30 DIAGNOSIS — Z23 FLU VACCINE NEED: ICD-10-CM

## 2021-12-30 DIAGNOSIS — R06.09 DYSPNEA ON EXERTION: Primary | ICD-10-CM

## 2021-12-30 PROCEDURE — 90662 IIV NO PRSV INCREASED AG IM: CPT | Performed by: INTERNAL MEDICINE

## 2021-12-30 PROCEDURE — 99214 OFFICE O/P EST MOD 30 MIN: CPT | Performed by: INTERNAL MEDICINE

## 2021-12-30 PROCEDURE — G0008 ADMIN INFLUENZA VIRUS VAC: HCPCS | Performed by: INTERNAL MEDICINE

## 2021-12-30 RX ORDER — ALBUTEROL SULFATE 90 UG/1
2 AEROSOL, METERED RESPIRATORY (INHALATION) EVERY 4 HOURS PRN
Qty: 8 G | Refills: 0 | Status: SHIPPED | OUTPATIENT
Start: 2021-12-30 | End: 2022-01-17

## 2021-12-30 RX ORDER — ESCITALOPRAM OXALATE 10 MG/1
10 TABLET ORAL DAILY
Qty: 90 TABLET | Refills: 3 | Status: SHIPPED | OUTPATIENT
Start: 2021-12-30 | End: 2022-12-13 | Stop reason: SDUPTHER

## 2021-12-30 RX ORDER — LISINOPRIL AND HYDROCHLOROTHIAZIDE 20; 12.5 MG/1; MG/1
1 TABLET ORAL 2 TIMES DAILY
Qty: 180 TABLET | Refills: 3 | Status: SHIPPED | OUTPATIENT
Start: 2021-12-30 | End: 2022-12-13 | Stop reason: SDUPTHER

## 2021-12-30 RX ORDER — NAPROXEN 500 MG/1
500 TABLET ORAL 2 TIMES DAILY WITH MEALS
Qty: 10 TABLET | Refills: 0 | Status: SHIPPED | OUTPATIENT
Start: 2021-12-30 | End: 2022-01-04

## 2021-12-30 NOTE — PROGRESS NOTES
"CC: f/u shortness of breath    History:  Sarah Littlejohn is a 71 y.o. female   She notes ongoing shortness of breath and right-sided chest pain that has been persistent.  She has known right-sided effusion and recently had echocardiogram, imaging, and laboratories that were largely unrevealing.  Unfortunately, the effusion is not large enough for thoracentesis.       ROS:  Review of Systems   Respiratory: Positive for shortness of breath and wheezing. Negative for cough.    Cardiovascular: Positive for chest pain. Negative for palpitations.        reports that she has never smoked. She has never used smokeless tobacco. She reports that she does not drink alcohol and does not use drugs.      Current Outpatient Medications:   •  allopurinol (ZYLOPRIM) 100 MG tablet, Take 100 mg by mouth Daily., Disp: , Rfl:   •  escitalopram (LEXAPRO) 10 MG tablet, Take 1 tablet by mouth Daily., Disp: 90 tablet, Rfl: 3  •  lisinopril-hydrochlorothiazide (PRINZIDE,ZESTORETIC) 20-12.5 MG per tablet, Take 1 tablet by mouth 2 (Two) Times a Day., Disp: 180 tablet, Rfl: 3  •  multivitamin with minerals tablet tablet, Take 1 tablet by mouth Daily., Disp: , Rfl:   •  oxybutynin XL (DITROPAN-XL) 10 MG 24 hr tablet, Take 1 tablet by mouth Daily., Disp: 90 tablet, Rfl: 1  •  albuterol sulfate  (90 Base) MCG/ACT inhaler, Inhale 2 puffs Every 4 (Four) Hours As Needed for Wheezing., Disp: 8 g, Rfl: 0  •  melatonin 5 MG tablet tablet, Take 0.5-2 tablets by mouth At Night As Needed (sleep)., Disp: 60 each, Rfl: 1  •  naproxen (Naprosyn) 500 MG tablet, Take 1 tablet by mouth 2 (Two) Times a Day With Meals for 5 days., Disp: 10 tablet, Rfl: 0  •  Pseudoephedrine-Acetaminophen (SINUS PO), Take 1 tablet by mouth Daily., Disp: , Rfl:     OBJECTIVE:  /70 (BP Location: Left arm, Patient Position: Sitting, Cuff Size: Large Adult)   Pulse 68   Temp 98.5 °F (36.9 °C) (Temporal)   Resp 16   Ht 160 cm (62.99\")   Wt 121 kg (266 lb 14.4 " oz)   SpO2 98%   BMI 47.29 kg/m²    Physical Exam  Constitutional:       General: She is not in acute distress.  Pulmonary:      Effort: Pulmonary effort is normal. No respiratory distress.      Breath sounds: Examination of the right-lower field reveals rales. Rales present. No decreased breath sounds or wheezing.   Neurological:      Mental Status: She is alert and oriented to person, place, and time.     CBC WITH AUTO DIFFERENTIAL (12/21/2021 13:24 EST)   PHOSPHORUS (12/21/2021 13:24 EST)   URIC ACID (12/21/2021 13:24 EST)   MAGNESIUM (12/21/2021 13:24 EST)   COMPREHENSIVE METABOLIC PANEL (12/21/2021 13:24 EST)   CT Chest Without Contrast Diagnostic (12/13/2021 12:38)   Adult Transthoracic Echo Complete W/ Cont if Necessary Per Protocol (12/13/2021 08:17)       Assessment/Plan     Diagnoses and all orders for this visit:    1. Dyspnea on exertion (Primary)  -     Full Pulmonary Function Test With Bronchodilator; Future  PFT for further characterization of possible lung disease vs deconditioning or other pathology. Unlikely that effusion is to blame for these symptoms.     2. Recurrent right pleural effusion  -     XR Chest PA & Lateral; Future  Recheck CXR in February to monitor. Volume not sufficient for sampling.     3. Pleuritic chest pain  -     naproxen (Naprosyn) 500 MG tablet; Take 1 tablet by mouth 2 (Two) Times a Day With Meals for 5 days.  Dispense: 10 tablet; Refill: 0  NSAID in short course to assist with pleuritic component. Given kidney disease, avoid long-term NSAID.     4. Wheezing  -     albuterol sulfate  (90 Base) MCG/ACT inhaler; Inhale 2 puffs Every 4 (Four) Hours As Needed for Wheezing.  Dispense: 8 g; Refill: 0    5. Recurrent major depressive disorder, in partial remission (HCC)  -     escitalopram (LEXAPRO) 10 MG tablet; Take 1 tablet by mouth Daily.  Dispense: 90 tablet; Refill: 3  Well controlled.     6. Essential hypertension  -     lisinopril-hydrochlorothiazide  (PRINZIDE,ZESTORETIC) 20-12.5 MG per tablet; Take 1 tablet by mouth 2 (Two) Times a Day.  Dispense: 180 tablet; Refill: 3  Well controlled, BP goal for age is <140/90 per JNC 8 guidelines and continue current medications    7. Flu vaccine need  -     Fluzone High-Dose 65+yrs ([id number])    An After Visit Summary was printed and given to the patient at discharge.  Return for Next scheduled follow up.          Michael Singleton D.O. 12/30/2021   Electronically signed.

## 2022-01-06 ENCOUNTER — TELEPHONE (OUTPATIENT)
Dept: INTERNAL MEDICINE | Facility: CLINIC | Age: 72
End: 2022-01-06

## 2022-01-06 DIAGNOSIS — Z11.52 ENCOUNTER FOR SCREENING FOR COVID-19: Primary | ICD-10-CM

## 2022-01-10 ENCOUNTER — LAB (OUTPATIENT)
Dept: LAB | Facility: HOSPITAL | Age: 72
End: 2022-01-10

## 2022-01-10 DIAGNOSIS — Z11.52 ENCOUNTER FOR SCREENING FOR COVID-19: ICD-10-CM

## 2022-01-10 LAB — SARS-COV-2 RNA PNL SPEC NAA+PROBE: NOT DETECTED

## 2022-01-10 PROCEDURE — 87635 SARS-COV-2 COVID-19 AMP PRB: CPT

## 2022-01-10 PROCEDURE — C9803 HOPD COVID-19 SPEC COLLECT: HCPCS

## 2022-01-11 ENCOUNTER — HOSPITAL ENCOUNTER (OUTPATIENT)
Dept: PULMONOLOGY | Facility: HOSPITAL | Age: 72
Discharge: HOME | End: 2022-01-11
Admitting: INTERNAL MEDICINE

## 2022-01-11 DIAGNOSIS — R06.09 DYSPNEA ON EXERTION: ICD-10-CM

## 2022-01-11 PROCEDURE — 94726 PLETHYSMOGRAPHY LUNG VOLUMES: CPT | Performed by: INTERNAL MEDICINE

## 2022-01-11 PROCEDURE — 94726 PLETHYSMOGRAPHY LUNG VOLUMES: CPT

## 2022-01-11 PROCEDURE — 94729 DIFFUSING CAPACITY: CPT

## 2022-01-11 PROCEDURE — 94729 DIFFUSING CAPACITY: CPT | Performed by: INTERNAL MEDICINE

## 2022-01-11 PROCEDURE — 94060 EVALUATION OF WHEEZING: CPT

## 2022-01-11 PROCEDURE — 94060 EVALUATION OF WHEEZING: CPT | Performed by: INTERNAL MEDICINE

## 2022-01-11 RX ORDER — ALBUTEROL SULFATE 2.5 MG/3ML
2.5 SOLUTION RESPIRATORY (INHALATION) ONCE
Status: COMPLETED | OUTPATIENT
Start: 2022-01-11 | End: 2022-01-11

## 2022-01-11 RX ADMIN — ALBUTEROL SULFATE 2.5 MG: 2.5 SOLUTION RESPIRATORY (INHALATION) at 11:12

## 2022-01-12 PROBLEM — J90 PLEURAL EFFUSION ON RIGHT: Status: ACTIVE | Noted: 2022-01-12

## 2022-01-16 DIAGNOSIS — R06.2 WHEEZING: ICD-10-CM

## 2022-01-17 DIAGNOSIS — D47.2 MGUS (MONOCLONAL GAMMOPATHY OF UNKNOWN SIGNIFICANCE): Primary | ICD-10-CM

## 2022-01-17 RX ORDER — ALBUTEROL SULFATE 90 UG/1
2 AEROSOL, METERED RESPIRATORY (INHALATION) EVERY 4 HOURS PRN
Qty: 8 G | Refills: 3 | Status: SHIPPED | OUTPATIENT
Start: 2022-01-17 | End: 2022-06-10

## 2022-01-18 NOTE — PROGRESS NOTES
Progress Note      Pt Name: Susan Rizo  YOB: 1950  MRN: [id number]    Date of evaluation: 1/19/2022  History Obtained From:  patient, electronic medical record    CHIEF COMPLAINT:    Chief Complaint   Patient presents with    Follow-up     MGUS (monoclonal gammopathy of unknown significance)     Current active problems  IgM MGUS    HISTORY OF PRESENT ILLNESS:    Susan Rizo is a 80 y.o.  female with IgM MGUS followed in the office since 12/15/2016. She is on observation only without any new issues. She denies any lymphadenopathy. She denies any night sweats, weight loss, excessive fatigue, pruritus. She did have an issue with right shoulder pain requiring steroids and subsequent injection by her PCP. She also had right-sided chest discomfort and had a CTA of the chest performed that was unrevealing for PE. She also has asthma now and is on an inhaler. She previously had mild eosinophilia that was felt to be reactive due to her allergies. She continues taking oxybutynin for her overactive bladder which is stable. She continues on Lexapro for anxiety which is stable. She continues taking allopurinol for hyperuricemia. She does take melatonin for her sleep at night. HEMATOLOGY HISTORY: IgMMGUS 1/23/2017  Jerrica Torre was seen in initial hematology consultation on 12/15/2016 referred by Dr. Becky Botello for an abnormal SPEP. SEROLOGY 7/21/2016  SPEP - M spike of 0.4 g/dL   QI - IgM of 331 ()    SEROLOGY 11/7/2016  SPEP - M spike of 0.6 g/dL    Her initial CBC in the office reveals a WBC of 6.05, Hgb 13.1 with .4 and PLT of 196,000.      BASELINE SEROLOGY 12/15/2016  B12 - 701   Folate - 12   QI - IgM 285 () with normal IgA and IgG   Free serum light chains - kappa 77.62, lambda 33.92, K/L ratio 2.29   CMP - creatinine 1.2 with GFR 47.9 otherwise normal    BASELINE URINE STUDIES:  24-hour urine for immunoelectrophoresis and immunofixation returned on 12/19/2016 revealed no monoclonal protein    BONE SURVEY AT [facility] ON 12/15/2016 was negative for any lytic lesions    Bone marrow aspiration and biopsy performed in [address] [place] [address] on 1/23/2017   · Normocellular (4050%) with trilineage hematopoiesis and no evidence of dysplasia nor increase in blasts. · There were adequate iron stores. · There was no diagnostic evidence of plasma cell dyscrasia nor lymphoproliferative disorder. · Molecular testing for Bcell gene rearrangements were negative. · Cytogenetics were normal female karyotype. CTs of the soft tissue neck, chest, abdomen was performed on 2/9/2017 at [facility] which were unrevealing for any lymphadenopathy or suspicious lesions for lymphoplasmacytic lymphoma. There were 2 lesions noted in the right kidney that were most likely cyst with an ultrasound of the kidney performed on 2/16/2017 revealing these to be simple cysts. With CT scans being unrevealing her final diagnosis is that of an IgM MGUS. She will be followed up in the office for physical examinations and serologic testing periodically. With stable serology, frequency a followup will be decreased. TUMOR HISTORY: 7 mm RML nodule  Baseline CT scans without IV contrast were performed at [facility] for evaluation of her monoclonal gammopathy. The CT of the chest without contrast on 2/9/2017 revealed a 7 mm nodule in the RML. Follow-up CT of the chest without contrast was performed at [facility] on 5/25/2017 and compared to 2/9/2017. There was a 7 mm nodular density in the RML which appears to be an area of fibrosis. The 4 mm nodule in the RLL is stable and recommendations is to follow-up again in 6 months. CT of the chest on 12/4/2017 and compared to the prior study of 2/25/2017 which revealed a stable 5 mm groundglass nodule in the RLL. This had not changed since May 2017 with minimal increase in size since February 2008.  Based on the size of the nodule and the Fleischner Society recommendations no further repeat CT scanning is required. Past Medical History:   Diagnosis Date    Anxiety     Depression     Diabetes mellitus ([address]     Hypertension     Hyperuricemia     Kidney failure     Renal cyst, right     Seasonal allergies         Past Surgical History:   Procedure Laterality Date    BONE MARROW BIOPSY N/A 1/23/2017    BONE MARROW ASPIRATION BIOPSY performed by Parul Rod MD at [address] CATARACT REMOVAL Bilateral 2016    [facility]           Current Outpatient Medications:     allopurinol (ZYLOPRIM) 100 MG tablet, Take 100 mg by mouth daily, Disp: , Rfl:     escitalopram (LEXAPRO) 10 MG tablet, Take 10 mg by mouth daily, Disp: , Rfl:     melatonin 5 MG TABS tablet, Take 2.5-10 mg by mouth nightly as needed, Disp: , Rfl:     lisinopril (PRINIVIL;ZESTRIL) 20 MG tablet, Take 20 mg by mouth daily, Disp: , Rfl:     oxybutynin (DITROPAN) 5 MG tablet, Take 5 mg by mouth daily, Disp: , Rfl:     Multiple Vitamins-Minerals (THERAPEUTIC MULTIVITAMIN-MINERALS) tablet, Take 1 tablet by mouth daily, Disp: , Rfl:      Allergies   Allergen Reactions    Neosporin [Neomycin-Polymyxin-Gramicidin] Dermatitis    Neomycin-Bacitracin Zn-Polymyx Rash    Penicillins Rash       Social History     Tobacco Use    Smoking status: Never Smoker    Smokeless tobacco: Never Used   Substance Use Topics    Alcohol use: Not Currently    Drug use: Never       Family History   Problem Relation Age of Onset    Other Mother         Lymphoma    Cancer Father         Leukemia       Subjective   REVIEW OF SYSTEMS:   Review of Systems   Constitutional: Negative for chills, diaphoresis, fatigue, fever and unexpected weight change. HENT: Negative for mouth sores, nosebleeds, sore throat, trouble swallowing and voice change. Eyes: Negative for photophobia, discharge and itching. Respiratory: Positive for shortness of breath (Asthma). Negative for cough and wheezing. Cardiovascular: Negative for chest pain, palpitations and leg swelling. Gastrointestinal: Negative for abdominal distention, abdominal pain, blood in stool, constipation, diarrhea, nausea and vomiting. Endocrine: Negative for cold intolerance, heat intolerance, polydipsia and polyuria. Genitourinary: Negative for difficulty urinating, dysuria, hematuria and urgency. Musculoskeletal: Positive for arthralgias (Right shoulder). Negative for back pain, joint swelling and myalgias. Skin: Negative for color change and rash. Allergic/Immunologic: Positive for environmental allergies (Pollen). Neurological: Negative for dizziness, tremors, seizures, syncope and light-headedness. Hematological: Negative for adenopathy. Does not bruise/bleed easily. Psychiatric/Behavioral: Negative for behavioral problems and suicidal ideas. The patient is not nervous/anxious. Objective   BP (!) 142/78   Pulse 60   Ht 5' 3\" (1.6 m)   Wt 261 lb 4.8 oz (118.5 kg)   SpO2 96%   BMI 46.29 kg/m²     PHYSICAL EXAM:  Physical Exam  Constitutional:       Appearance: She is well-developed. She is obese. HENT:      Head: Normocephalic and atraumatic. Eyes:      General: No scleral icterus. Conjunctiva/sclera: Conjunctivae normal.   Neck:      Trachea: No tracheal deviation. Cardiovascular:      Rate and Rhythm: Normal rate and regular rhythm. Heart sounds: Normal heart sounds. No murmur heard. Pulmonary:      Effort: Pulmonary effort is normal. No respiratory distress. Breath sounds: Normal breath sounds. Abdominal:      General: Bowel sounds are normal. There is no distension. Palpations: Abdomen is soft. Tenderness: There is no abdominal tenderness. Musculoskeletal:         General: Tenderness (Right shoulder) present. Cervical back: Normal range of motion and neck supple. Skin:     General: Skin is warm and dry. Findings: No rash. Neurological:      Mental Status: She is alert and oriented to person, place, and time. Coordination: Coordination normal.   Psychiatric:         Behavior: Behavior normal.         Thought Content: Thought content normal.         CBC reviewed by me  Lab Results   Component Value Date    WBC 8.9 12/21/2021    HGB 12.0 12/21/2021    HCT 39.6 12/21/2021    .0 (H) 12/21/2021     12/21/2021         VISIT DIAGNOSES  1. MGUS (monoclonal gammopathy of unknown significance)    2. Thrombocytopenia ([address]    3. Eosinophilia, unspecified type    4. Body mass index (BMI) 45.0-49.9, adult ([facility])        ASSESSMENT/PLAN:    1. IgM MGUS. Serology 1/24/2019  Crt - 1.49 with GFR down improved to 36  Ca - WNL - 9.5  QI - IgM 316 - stable  SPEP - M spike of 0.4 g/dL - stable  Free serum light chains - kappa 96.2, lambda 32.9, K/L ratio 2.92 - all stable    Serology 2/28/2020  QI -IgG 1325, IgA 266, IgM 414 ()  SPEP - M spike 0.4 g/dL with immunofixation revealing IgM kappa  Free serum light chains - kappa 129.6, lambda 48.1, K/L ratio 2.69    Serology 6/26/2020  QI - IgG 1260, IgA 248, IgM 392  SPEP - M spike 0.6 g/dL with immunofixation revealing IgM kappa  Free serum light chains - kappa 143.5, lambda 42.7, K/L ratio 3.36  CMP - crt 1.6 with GFR 32    Serology 12/18/2020  QI - IgG 1270, IgA 245, IgM 390  SPEP - M spike 0.5 g/dL with immunofixation revealing IgM kappa  Free serum light chains - kappa 140.4, lambda 50.6, K/L ratio 2.77  B2M - 5.3  CMP - crt 1.5 with GFR 34, Ca 10.1    Serology 7/2/2021  SPEP -  M spike 0.5 g/dL with immunofixation revealing IgM kappa  QI - IgG 1341, IgA 252, IgM 450  Free serum light chains - Kappa 150.5, Lambda 59.7, K/L ratio 2.52  B2M - 7.1  CMP - crt 1.7 with GFR 30, Ca 9.6  LDH - 472 - WNL    CTA chest [facility] [place] 12/13/2021 did not reveal any lymphadenopathy.       C - ca 9.7 on 12/21/2021  R - crt 1.4 with GFR 37 12/21/2021- which is stable to improved - followed by  Marky Rodriguez  A - HGB 12.9 today  B - no bone pain or lesions    Her protein serology last July was stable. Her CMP that was done recently on 12/21/2021 was stable. She does not have any palpable peripheral lymphadenopathy. She is not having any B symptoms. Protein serology will be rechecked today. 2.  Mild thrombocytopenia    Serology 5/26/2020  B12 = 540  Folate - 24.1    PLT normal at 239,000.    2.  Eosinophilia. WBC is normal at 8.8 with 73% neutrophil, 17% lymphocyte, 9.4% monocyte. Eosinophil was not included on the 3 for differential today      IgE - 28 (6  495)      HEALTH MAINTENANCE    · Breast cancer screening. Bilateral screening mammogram 7/20/2021 at [facility] was BI-RADS 2.      · Colon cancer screening. Her colonoscopy is up-to-date and will not be due again until 2026    · Cervical cancer screening. Pap smear 7/12/2019 was negative. Immunization History   Administered Date(s) Administered    COVID-19, Pfizer Purple top, DILUTE for use, 12+ yrs, 30mcg/0.3mL dose 12/09/2021   She will bring her card in next visit as she has received both Pfizer injections plus the booster. She has not contracted COVID-19. Orders Placed This Encounter   Procedures    [place]/Lambda Free Lt Chains, Serum Quant    Electrophoresis Protein, Serum with Reflex to Immunofixation    Lactate Dehydrogenase        Return in about 6 months (around 7/19/2022) for With Vignesh Gale.      Celso Heredia PA-C  10:47 AM  1/19/2022

## 2022-01-19 ENCOUNTER — OFFICE VISIT (OUTPATIENT)
Dept: HEMATOLOGY | Age: 72
End: 2022-01-19
Payer: MEDICARE

## 2022-01-19 ENCOUNTER — HOSPITAL ENCOUNTER (OUTPATIENT)
Dept: INFUSION THERAPY | Age: 72
Discharge: HOME | End: 2022-01-19
Payer: MEDICARE

## 2022-01-19 VITALS
HEART RATE: 60 BPM | WEIGHT: 261.3 LBS | OXYGEN SATURATION: 96 % | BODY MASS INDEX: 46.3 KG/M2 | DIASTOLIC BLOOD PRESSURE: 78 MMHG | SYSTOLIC BLOOD PRESSURE: 142 MMHG | HEIGHT: 63 IN

## 2022-01-19 DIAGNOSIS — D72.10 EOSINOPHILIA, UNSPECIFIED TYPE: ICD-10-CM

## 2022-01-19 DIAGNOSIS — D47.2 MGUS (MONOCLONAL GAMMOPATHY OF UNKNOWN SIGNIFICANCE): ICD-10-CM

## 2022-01-19 DIAGNOSIS — D69.6 THROMBOCYTOPENIA (HCC): ICD-10-CM

## 2022-01-19 DIAGNOSIS — D47.2 MGUS (MONOCLONAL GAMMOPATHY OF UNKNOWN SIGNIFICANCE): Primary | ICD-10-CM

## 2022-01-19 LAB
HCT VFR BLD CALC: 40.8 % (ref 34.1–44.9)
HEMOGLOBIN: 12.9 G/DL (ref 11.2–15.7)
LACTATE DEHYDROGENASE: 173 U/L (ref 313–618)
MCH RBC QN AUTO: 32 PG (ref 25.6–32.2)
MCHC RBC AUTO-ENTMCNC: 31.6 G/DL (ref 32.3–35.5)
MCV RBC AUTO: 101.2 FL (ref 79.4–94.8)
PDW BLD-RTO: 12.2 % (ref 11.7–14.4)
PLATELET # BLD: 239 K/UL (ref 182–369)
PMV BLD AUTO: 9.4 FL (ref 7.4–10.4)
RBC # BLD: 4.03 M/UL (ref 3.93–5.22)
WBC # BLD: 8.8 K/UL (ref 3.98–10.04)

## 2022-01-19 PROCEDURE — 99212 OFFICE O/P EST SF 10 MIN: CPT

## 2022-01-19 PROCEDURE — 1036F TOBACCO NON-USER: CPT | Performed by: PHYSICIAN ASSISTANT

## 2022-01-19 PROCEDURE — 99213 OFFICE O/P EST LOW 20 MIN: CPT | Performed by: PHYSICIAN ASSISTANT

## 2022-01-19 PROCEDURE — 1123F ACP DISCUSS/DSCN MKR DOCD: CPT | Performed by: PHYSICIAN ASSISTANT

## 2022-01-19 PROCEDURE — G8484 FLU IMMUNIZE NO ADMIN: HCPCS | Performed by: PHYSICIAN ASSISTANT

## 2022-01-19 PROCEDURE — 3017F COLORECTAL CA SCREEN DOC REV: CPT | Performed by: PHYSICIAN ASSISTANT

## 2022-01-19 PROCEDURE — 85027 COMPLETE CBC AUTOMATED: CPT

## 2022-01-19 PROCEDURE — G8417 CALC BMI ABV UP PARAM F/U: HCPCS | Performed by: PHYSICIAN ASSISTANT

## 2022-01-19 PROCEDURE — 4040F PNEUMOC VAC/ADMIN/RCVD: CPT | Performed by: PHYSICIAN ASSISTANT

## 2022-01-19 PROCEDURE — G8427 DOCREV CUR MEDS BY ELIG CLIN: HCPCS | Performed by: PHYSICIAN ASSISTANT

## 2022-01-19 PROCEDURE — G8400 PT W/DXA NO RESULTS DOC: HCPCS | Performed by: PHYSICIAN ASSISTANT

## 2022-01-19 PROCEDURE — 1090F PRES/ABSN URINE INCON ASSESS: CPT | Performed by: PHYSICIAN ASSISTANT

## 2022-01-19 ASSESSMENT — ENCOUNTER SYMPTOMS
VOMITING: 0
SORE THROAT: 0
CONSTIPATION: 0
DIARRHEA: 0
WHEEZING: 0
BLOOD IN STOOL: 0
COUGH: 0
COLOR CHANGE: 0
EYE ITCHING: 0
EYE DISCHARGE: 0
TROUBLE SWALLOWING: 0
SHORTNESS OF BREATH: 1
PHOTOPHOBIA: 0
BACK PAIN: 0
VOICE CHANGE: 0
ABDOMINAL PAIN: 0
ABDOMINAL DISTENTION: 0
NAUSEA: 0

## 2022-01-21 LAB
FREE KAPPA LIGHT CHAINS: 13.94 MG/DL (ref 0.37–1.94)
FREE KAPPA/LAMBDA RATIO: 2.11 (ref 0.26–1.65)
FREE LAMBDA LIGHT CHAINS: 6.6 MG/DL (ref 0.57–2.63)

## 2022-01-26 LAB
ALBUMIN SERPL-MCNC: 3.61 G/DL (ref 3.75–5.01)
ALPHA-1-GLOBULIN: 0.44 G/DL (ref 0.19–0.46)
ALPHA-2-GLOBULIN: 1.12 G/DL (ref 0.48–1.05)
BETA GLOBULIN: 0.91 G/DL (ref 0.48–1.1)
GAMMA GLOBULIN: 1.72 G/DL (ref 0.62–1.51)
IGA: 277 MG/DL (ref 68–408)
IGG: 1580 MG/DL (ref 768–1632)
IGM: 394 MG/DL (ref 35–263)
IMMUNOFIXATION REFLEX: ABNORMAL
SPE/IFE INTERPRETATION: ABNORMAL
TOTAL PROTEIN: 7.8 G/DL (ref 6.3–8.2)

## 2022-02-10 ENCOUNTER — HOSPITAL ENCOUNTER (OUTPATIENT)
Dept: GENERAL RADIOLOGY | Facility: HOSPITAL | Age: 72
Discharge: HOME | End: 2022-02-10
Admitting: INTERNAL MEDICINE

## 2022-02-10 DIAGNOSIS — J90 RECURRENT RIGHT PLEURAL EFFUSION: ICD-10-CM

## 2022-02-10 PROCEDURE — 71046 X-RAY EXAM CHEST 2 VIEWS: CPT

## 2022-04-29 DIAGNOSIS — N32.81 OVERACTIVE BLADDER: ICD-10-CM

## 2022-04-29 DIAGNOSIS — N39.41 URGE INCONTINENCE: ICD-10-CM

## 2022-04-29 RX ORDER — OXYBUTYNIN CHLORIDE 10 MG/1
10 TABLET, EXTENDED RELEASE ORAL DAILY
Qty: 90 TABLET | Refills: 1 | Status: SHIPPED | OUTPATIENT
Start: 2022-04-29 | End: 2022-11-04 | Stop reason: SDUPTHER

## 2022-04-29 NOTE — TELEPHONE ENCOUNTER
Caller: Sarah Littlejohn    Relationship: Self    Best call back number: [phone]    Requested Prescriptions:   Requested Prescriptions     Pending Prescriptions Disp Refills   • oxybutynin XL (DITROPAN-XL) 10 MG 24 hr tablet 90 tablet 1     Sig: Take 1 tablet by mouth Daily.        Pharmacy where request should be sent: [facility] [address] [place] [address] - [phone] [facility] [phone] FX     Additional details provided by patient:   PATIENT IS OUT OF MEDICATION    Does the patient have less than a 3 day supply:  [x] Yes  [] No    Sobeida Montalvo, PCT   04/29/22 11:21 CDT         DELETE AFTER READING TO PATIENT: “Thank you for sharing this information with me. I will send a message to the clinical team. Please allow 48 hours for the clinical staff to follow up on this request.”

## 2022-06-10 ENCOUNTER — OFFICE VISIT (OUTPATIENT)
Dept: INTERNAL MEDICINE | Facility: CLINIC | Age: 72
End: 2022-06-10

## 2022-06-10 VITALS
WEIGHT: 247.5 LBS | HEART RATE: 68 BPM | BODY MASS INDEX: 43.85 KG/M2 | HEIGHT: 63 IN | DIASTOLIC BLOOD PRESSURE: 78 MMHG | SYSTOLIC BLOOD PRESSURE: 136 MMHG | TEMPERATURE: 97.6 F | OXYGEN SATURATION: 98 % | RESPIRATION RATE: 16 BRPM

## 2022-06-10 DIAGNOSIS — N18.32 STAGE 3B CHRONIC KIDNEY DISEASE: ICD-10-CM

## 2022-06-10 DIAGNOSIS — Z12.31 ENCOUNTER FOR SCREENING MAMMOGRAM FOR MALIGNANT NEOPLASM OF BREAST: ICD-10-CM

## 2022-06-10 DIAGNOSIS — Z13.820 ENCOUNTER FOR OSTEOPOROSIS SCREENING IN ASYMPTOMATIC POSTMENOPAUSAL PATIENT: ICD-10-CM

## 2022-06-10 DIAGNOSIS — E66.01 CLASS 3 SEVERE OBESITY DUE TO EXCESS CALORIES WITH SERIOUS COMORBIDITY AND BODY MASS INDEX (BMI) OF 40.0 TO 44.9 IN ADULT: ICD-10-CM

## 2022-06-10 DIAGNOSIS — I10 ESSENTIAL HYPERTENSION: Primary | ICD-10-CM

## 2022-06-10 DIAGNOSIS — R73.02 IMPAIRED GLUCOSE TOLERANCE: ICD-10-CM

## 2022-06-10 DIAGNOSIS — Z78.0 ENCOUNTER FOR OSTEOPOROSIS SCREENING IN ASYMPTOMATIC POSTMENOPAUSAL PATIENT: ICD-10-CM

## 2022-06-10 PROCEDURE — 99213 OFFICE O/P EST LOW 20 MIN: CPT | Performed by: INTERNAL MEDICINE

## 2022-06-10 NOTE — PROGRESS NOTES
"CC:  F/u hypertension    History:  Sarah Littlejohn is a 72 y.o. female   She notes she has been doing very well without any acute illness.  Her blood pressure has been doing well and she is pleased to report that she has been making dietary changes and has been successfully losing weight with this.       ROS:  Review of Systems   Respiratory: Negative for shortness of breath.    Cardiovascular: Negative for chest pain.        reports that she has never smoked. She has never used smokeless tobacco. She reports that she does not drink alcohol and does not use drugs.      Current Outpatient Medications:   •  allopurinol (ZYLOPRIM) 100 MG tablet, Take 100 mg by mouth Daily., Disp: , Rfl:   •  escitalopram (LEXAPRO) 10 MG tablet, Take 1 tablet by mouth Daily., Disp: 90 tablet, Rfl: 3  •  lisinopril-hydrochlorothiazide (PRINZIDE,ZESTORETIC) 20-12.5 MG per tablet, Take 1 tablet by mouth 2 (Two) Times a Day., Disp: 180 tablet, Rfl: 3  •  multivitamin with minerals tablet tablet, Take 1 tablet by mouth Daily., Disp: , Rfl:   •  oxybutynin XL (DITROPAN-XL) 10 MG 24 hr tablet, Take 1 tablet by mouth Daily., Disp: 90 tablet, Rfl: 1  •  Pseudoephedrine-Acetaminophen (SINUS PO), Take 1 tablet by mouth Daily., Disp: , Rfl:     OBJECTIVE:  /78 (BP Location: Left arm, Patient Position: Sitting, Cuff Size: Adult)   Pulse 68   Temp 97.6 °F (36.4 °C)   Resp 16   Ht 160 cm (62.99\")   Wt 112 kg (247 lb 8 oz)   SpO2 98%   Breastfeeding No   BMI 43.86 kg/m²    Physical Exam  Constitutional:       General: She is not in acute distress.  Cardiovascular:      Rate and Rhythm: Normal rate and regular rhythm.      Heart sounds: Normal heart sounds. No murmur heard.  Pulmonary:      Effort: Pulmonary effort is normal. No respiratory distress.      Breath sounds: Normal breath sounds. No wheezing.   Neurological:      Mental Status: She is alert and oriented to person, place, and time.      Gait: Gait normal.   Psychiatric: "         Mood and Affect: Mood normal.         Behavior: Behavior normal.         Assessment/Plan     Diagnoses and all orders for this visit:    1. Essential hypertension (Primary)  -     Lipid panel; Future  Well controlled, BP goal for age is <140/90 per JNC 8 guidelines and continue current medications    2. Impaired glucose tolerance  -     Lipid panel; Future  -     Hemoglobin A1c; Future  Labs with next blood draw with [facility].     3. Class 3 severe obesity due to excess calories with serious comorbidity and body mass index (BMI) of 40.0 to 44.9 in adult ([facility])  Class 3 Severe Obesity (BMI >=40). Obesity-related health conditions include the following: hypertension. Obesity is improving with lifestyle modifications. BMI is is above average; BMI management plan is completed. We discussed portion control and increasing exercise.    4. Stage 3b chronic kidney disease (CMS/[facility])  Following with nephrology next month.     5. Encounter for osteoporosis screening in asymptomatic postmenopausal patient  -     DEXA Bone Density Axial; Future    6. Encounter for screening mammogram for malignant neoplasm of breast  -     Mammo Screening Digital Tomosynthesis Bilateral With CAD; Future        An After Visit Summary was printed and given to the patient at discharge.  Return in about 6 months (around 12/10/2022) for Medicare Wellness.          Michael Singleton D.O. 6/10/2022   Electronically signed.

## 2022-07-19 NOTE — PROGRESS NOTES
Progress Note      Pt Name: Alan Monzon  YOB: 1950  MRN: [id number]    Date of evaluation: 8/11/2022  History Obtained From:  patient, electronic medical record    CHIEF COMPLAINT:    Chief Complaint   Patient presents with    Follow-up     MGUS (monoclonal gammopathy of unknown significance)     Current active problems  IgM MGUS    HISTORY OF PRESENT ILLNESS:    Alan Monzon is a 71 y.o.  female with IgM MGUS followed in the office since 12/15/2016. She is on observation only without any new issues. She denies any hospitalizations since I last saw her. She denies any night sweats, weight loss, excessive fatigue, pruritus. She denies feeling any lymphadenopathy. She continues taking oxybutynin for her overactive bladder which is stable. She continues on Lexapro 10 mg daily for anxiety which is stable. She continues taking allopurinol 100 mg daily for hyperuricemia. She does take melatonin for her sleep at night. Blood pressure is stable on her lisinopril 20 daily. HEMATOLOGY HISTORY: IgM-MGUS 1/23/2017  Aisha Dobson was seen in initial hematology consultation on 12/15/2016 referred by Dr. Jimmie Perez for an abnormal SPEP. SEROLOGY 7/21/2016  SPEP - M spike of 0.4 g/dL   QI - IgM of 331 ()    SEROLOGY 11/7/2016  SPEP - M spike of 0.6 g/dL    Her initial CBC in the office reveals a WBC of 6.05, Hgb 13.1 with .4 and PLT of 196,000.      BASELINE SEROLOGY 12/15/2016  B12 - 701   Folate - 12   QI - IgM 285 () with normal IgA and IgG   Free serum light chains - kappa 77.62, lambda 33.92, K/L ratio 2.29   CMP - creatinine 1.2 with GFR 47.9 otherwise normal    BASELINE URINE STUDIES:  24-hour urine for immunoelectrophoresis and immunofixation returned on 12/19/2016 revealed no monoclonal protein    BONE SURVEY AT [facility] ON 12/15/2016 was negative for any lytic lesions    Bone marrow aspiration and biopsy performed in [facility] on 1/23/2017   Normocellular (40-50%) with trilineage hematopoiesis and no evidence of dysplasia nor increase in blasts. There were adequate iron stores. There was no diagnostic evidence of plasma cell dyscrasia nor lymphoproliferative disorder. Molecular testing for B-cell gene rearrangements were negative. Cytogenetics were normal female karyotype. CTs of the soft tissue neck, chest, abdomen was performed on 2/9/2017 at [facility] which were unrevealing for any lymphadenopathy or suspicious lesions for lymphoplasmacytic lymphoma. There were 2 lesions noted in the right kidney that were most likely cyst with an ultrasound of the kidney performed on 2/16/2017 revealing these to be simple cysts. With CT scans being unrevealing her final diagnosis is that of an IgM MGUS. She will be followed up in the office for physical examinations and serologic testing periodically. With stable serology, frequency a followup will be decreased. TUMOR HISTORY: 7 mm RML nodule  Baseline CT scans without IV contrast were performed at [facility] for evaluation of her monoclonal gammopathy. The CT of the chest without contrast on 2/9/2017 revealed a 7 mm nodule in the RML. Follow-up CT of the chest without contrast was performed at [facility] on 5/25/2017 and compared to 2/9/2017. There was a 7 mm nodular density in the RML which appears to be an area of fibrosis. The 4 mm nodule in the RLL is stable and recommendations is to follow-up again in 6 months. CT of the chest on 12/4/2017 and compared to the prior study of 2/25/2017 which revealed a stable 5 mm groundglass nodule in the RLL. This had not changed since May 2017 with minimal increase in size since February 2008. Based on the size of the nodule and the Fleischner Society recommendations no further repeat CT scanning is required.         Past Medical History:   Diagnosis Date    Anxiety     Depression     Diabetes mellitus ([facility] [address]     Hypertension     Hyperuricemia     Kidney failure     Renal cyst, right     Seasonal allergies         Past Surgical History:   Procedure Laterality Date    BONE MARROW BIOPSY N/A 1/23/2017    BONE MARROW ASPIRATION BIOPSY performed by Vashti Worley MD at [facility] 19 Bilateral 2016    [address] [facility]           Current Outpatient Medications:     allopurinol (ZYLOPRIM) 100 MG tablet, Take 100 mg by mouth daily, Disp: , Rfl:     escitalopram (LEXAPRO) 10 MG tablet, Take 10 mg by mouth daily, Disp: , Rfl:     melatonin 5 MG TABS tablet, Take 2.5-10 mg by mouth nightly as needed, Disp: , Rfl:     lisinopril (PRINIVIL;ZESTRIL) 20 MG tablet, Take 20 mg by mouth daily, Disp: , Rfl:     oxybutynin (DITROPAN) 5 MG tablet, Take 5 mg by mouth daily, Disp: , Rfl:     Multiple Vitamins-Minerals (THERAPEUTIC MULTIVITAMIN-MINERALS) tablet, Take 1 tablet by mouth daily, Disp: , Rfl:      Allergies   Allergen Reactions    Neosporin [Neomycin-Polymyxin-Gramicidin] Dermatitis    Neomycin-Bacitracin Zn-Polymyx Rash    Penicillins Rash       Social History     Tobacco Use    Smoking status: Never    Smokeless tobacco: Never   Substance Use Topics    Alcohol use: Not Currently    Drug use: Never       Family History   Problem Relation Age of Onset    Other Mother         Lymphoma    Cancer Father         Leukemia       Subjective   REVIEW OF SYSTEMS:   Review of Systems   Constitutional:  Negative for chills, diaphoresis, fatigue, fever and unexpected weight change. HENT:  Negative for mouth sores, nosebleeds, sore throat, trouble swallowing and voice change. Eyes:  Negative for photophobia, discharge and itching. Respiratory:  Positive for shortness of breath (Asthma which is stable). Negative for cough and wheezing. Cardiovascular:  Negative for chest pain, palpitations and leg swelling. Gastrointestinal:  Negative for abdominal distention, abdominal pain, blood in stool, constipation, diarrhea, nausea and vomiting. Endocrine: Negative for cold intolerance, heat intolerance, polydipsia and polyuria. Genitourinary:  Negative for difficulty urinating, dysuria, hematuria and urgency. Musculoskeletal:  Positive for arthralgias. Negative for back pain, joint swelling and myalgias. Skin:  Negative for color change and rash. Allergic/Immunologic: Positive for environmental allergies (Pollen). Neurological:  Negative for dizziness, tremors, seizures, syncope and light-headedness. Hematological:  Negative for adenopathy. Does not bruise/bleed easily. Psychiatric/Behavioral:  Negative for behavioral problems and suicidal ideas. The patient is not nervous/anxious. Objective   /84   Pulse 94   Ht 5' 3\" (1.6 m)   Wt 247 lb 3.2 oz (112.1 kg)   SpO2 100%   BMI 43.79 kg/m²     PHYSICAL EXAM:  Physical Exam  Constitutional:       Appearance: She is well-developed. She is obese. HENT:      Head: Normocephalic and atraumatic. Eyes:      General: No scleral icterus. Conjunctiva/sclera: Conjunctivae normal.   Neck:      Trachea: No tracheal deviation. Cardiovascular:      Rate and Rhythm: Normal rate and regular rhythm. Heart sounds: Normal heart sounds. No murmur heard. Pulmonary:      Effort: Pulmonary effort is normal. No respiratory distress. Breath sounds: Normal breath sounds. Abdominal:      General: Bowel sounds are normal. There is no distension. Palpations: Abdomen is soft. Tenderness: There is no abdominal tenderness. Musculoskeletal:         General: No tenderness. Cervical back: Normal range of motion and neck supple. Skin:     General: Skin is warm and dry. Findings: No rash. Neurological:      Mental Status: She is alert and oriented to person, place, and time. Coordination: Coordination normal.   Psychiatric:         Behavior: Behavior normal.         Thought Content:  Thought content normal.       CBC reviewed by me  Lab Results   Component Value Date    WBC 9.30 08/11/2022    HGB 12.8 08/11/2022    HCT 41.2 08/11/2022    .0 (H) 08/11/2022     08/11/2022         VISIT DIAGNOSES  1. MGUS (monoclonal gammopathy of unknown significance)    2. Thrombocytopenia ([address]    3. Eosinophilia, unspecified type    4. Body mass index (BMI) 45.0-49.9, adult ([facility])        ASSESSMENT/PLAN:    1. IgM MGUS. Serology 1/24/2019  Crt - 1.49 with GFR down improved to 36  Ca - WNL - 9.5  QI - IgM 316 - stable  SPEP - M spike of 0.4 g/dL - stable  Free serum light chains - kappa 96.2, lambda 32.9, K/L ratio 2.92 - all stable    Serology 2/28/2020  QI -IgG 1325, IgA 266, IgM 414 ()  SPEP - M spike 0.4 g/dL with immunofixation revealing IgM kappa  Free serum light chains - kappa 129.6, lambda 48.1, K/L ratio 2.69    Serology 6/26/2020  QI - IgG 1260, IgA 248, IgM 392  SPEP - M spike 0.6 g/dL with immunofixation revealing IgM kappa  Free serum light chains - kappa 143.5, lambda 42.7, K/L ratio 3.36  CMP - crt 1.6 with GFR 32    Serology 12/18/2020  QI - IgG 1270, IgA 245, IgM 390  SPEP - M spike 0.5 g/dL with immunofixation revealing IgM kappa  Free serum light chains - kappa 140.4, lambda 50.6, K/L ratio 2.77  B2M - 5.3  CMP - crt 1.5 with GFR 34, Ca 10.1    Serology 7/2/2021  SPEP -  M spike 0.5 g/dL with immunofixation revealing IgM kappa  QI - IgG 1341, IgA 252, IgM 450  Free serum light chains - Kappa 150.5, Lambda 59.7, K/L ratio 2.52  B2M - 7.1  CMP - crt 1.7 with GFR 30, Ca 9.6  LDH - 472 - WNL    CTA chest [facility] [place] 12/13/2021 did not reveal any lymphadenopathy. Serology 1/19/2022  SPEP - unable to quantify M spike IgM Kappa  QI - IgG 1580, IgA 277, IgM 394  Free serum light chains - Kappa 13.94, Lambda 6.6, K/L 2.11, L/K 0.47  LDH - 173    C - ca 9.7 on 12/21/2021  R - crt 1.4 with GFR 37 12/21/2021- which is stable to improved - followed by Dr. Emilee Barnes  A - HGB 12.8 which is stable.   B - no bone pain or lesions    CBC today reveals WBC of 9.3, Hgb 12.8, , ,000. Her CBC is stable. Physical examination does not reveal any palpable peripheral adenopathy. She does not have any B symptoms. Overall she feels very well. Repeat protein serology. 2.  Mild thrombocytopenia      PLT today is normal at 185,000. 3.  Eosinophilia. IgE - 28 (6 - 495)        WBC continues to be normal.  She has minimal elevated eosinophils at 7.3% with absolute eosinophil count only 0.68. Is most likely related to seasonal allergies or asthma. Both are controlled at this time. HEALTH MAINTENANCE    Breast cancer screening. Bilateral screening mammogram 7/22/2022 at [facility] was BI-RADS 1    Colon cancer screening. Her colonoscopy is up-to-date and will not be due again until 2026    Cervical cancer screening. Pap smear 7/12/2019 was negative. Immunization History   Administered Date(s) Administered    COVID-19, MODERNA BLUE border, Primary or Immunocompromised, (age 12y+), IM, 100 mcg/0.5mL 03/11/2021, 04/08/2021    COVID-19, PFIZER PURPLE top, DILUTE for use, (age 15 y+), 30mcg/0.3mL 12/09/2021           Orders Placed This Encounter   Procedures    Comprehensive Metabolic Panel    Lactate Dehydrogenase    MONOCLONAL PROTEIN AND FLC, SERUM        Return in about 6 months (around 2/11/2023) for With Linda Neve.      Loretta Adam PA-C  12:34 PM  8/11/2022

## 2022-07-22 ENCOUNTER — HOSPITAL ENCOUNTER (OUTPATIENT)
Dept: MAMMOGRAPHY | Facility: HOSPITAL | Age: 72
Discharge: HOME | End: 2022-07-22

## 2022-07-22 ENCOUNTER — HOSPITAL ENCOUNTER (OUTPATIENT)
Dept: BONE DENSITY | Facility: HOSPITAL | Age: 72
Discharge: HOME | End: 2022-07-22

## 2022-07-22 DIAGNOSIS — Z13.820 ENCOUNTER FOR OSTEOPOROSIS SCREENING IN ASYMPTOMATIC POSTMENOPAUSAL PATIENT: ICD-10-CM

## 2022-07-22 DIAGNOSIS — Z78.0 ENCOUNTER FOR OSTEOPOROSIS SCREENING IN ASYMPTOMATIC POSTMENOPAUSAL PATIENT: ICD-10-CM

## 2022-07-22 DIAGNOSIS — Z12.31 ENCOUNTER FOR SCREENING MAMMOGRAM FOR MALIGNANT NEOPLASM OF BREAST: ICD-10-CM

## 2022-07-22 DIAGNOSIS — M85.852 OSTEOPENIA OF NECK OF LEFT FEMUR: Primary | ICD-10-CM

## 2022-07-22 PROCEDURE — 77063 BREAST TOMOSYNTHESIS BI: CPT

## 2022-07-22 PROCEDURE — 77080 DXA BONE DENSITY AXIAL: CPT

## 2022-07-22 PROCEDURE — 77067 SCR MAMMO BI INCL CAD: CPT

## 2022-08-10 DIAGNOSIS — D75.89 MACROCYTOSIS: Primary | ICD-10-CM

## 2022-08-10 DIAGNOSIS — D47.2 MGUS (MONOCLONAL GAMMOPATHY OF UNKNOWN SIGNIFICANCE): ICD-10-CM

## 2022-08-11 ENCOUNTER — OFFICE VISIT (OUTPATIENT)
Dept: HEMATOLOGY | Age: 72
End: 2022-08-11
Payer: MEDICARE

## 2022-08-11 ENCOUNTER — HOSPITAL ENCOUNTER (OUTPATIENT)
Dept: INFUSION THERAPY | Age: 72
Discharge: HOME | End: 2022-08-11
Payer: MEDICARE

## 2022-08-11 VITALS
SYSTOLIC BLOOD PRESSURE: 136 MMHG | WEIGHT: 247.2 LBS | HEIGHT: 63 IN | HEART RATE: 94 BPM | BODY MASS INDEX: 43.8 KG/M2 | OXYGEN SATURATION: 100 % | DIASTOLIC BLOOD PRESSURE: 84 MMHG

## 2022-08-11 DIAGNOSIS — E83.52 SERUM CALCIUM ELEVATED: Primary | ICD-10-CM

## 2022-08-11 DIAGNOSIS — D47.2 MGUS (MONOCLONAL GAMMOPATHY OF UNKNOWN SIGNIFICANCE): ICD-10-CM

## 2022-08-11 DIAGNOSIS — D75.89 MACROCYTOSIS: ICD-10-CM

## 2022-08-11 DIAGNOSIS — D72.10 EOSINOPHILIA, UNSPECIFIED TYPE: ICD-10-CM

## 2022-08-11 DIAGNOSIS — D69.6 THROMBOCYTOPENIA (HCC): ICD-10-CM

## 2022-08-11 DIAGNOSIS — D47.2 MGUS (MONOCLONAL GAMMOPATHY OF UNKNOWN SIGNIFICANCE): Primary | ICD-10-CM

## 2022-08-11 LAB
ALBUMIN SERPL-MCNC: 4.7 G/DL (ref 3.5–5.2)
ALP BLD-CCNC: 106 U/L (ref 35–104)
ALT SERPL-CCNC: 17 U/L (ref 9–52)
ANION GAP SERPL CALCULATED.3IONS-SCNC: 13 MMOL/L (ref 7–19)
AST SERPL-CCNC: 25 U/L (ref 14–36)
BASOPHILS ABSOLUTE: 0.03 K/UL (ref 0.01–0.08)
BASOPHILS RELATIVE PERCENT: 0.3 % (ref 0.1–1.2)
BILIRUB SERPL-MCNC: 0.5 MG/DL (ref 0.2–1.3)
BUN BLDV-MCNC: 36 MG/DL (ref 7–17)
CALCIUM SERPL-MCNC: 10.9 MG/DL (ref 8.4–10.2)
CHLORIDE BLD-SCNC: 107 MMOL/L (ref 98–111)
CO2: 24 MMOL/L (ref 22–29)
CREAT SERPL-MCNC: 1.5 MG/DL (ref 0.5–1)
EOSINOPHILS ABSOLUTE: 0.68 K/UL (ref 0.04–0.54)
EOSINOPHILS RELATIVE PERCENT: 7.3 % (ref 0.7–7)
GFR NON-AFRICAN AMERICAN: 34
GLUCOSE BLD-MCNC: 103 MG/DL (ref 74–106)
HCT VFR BLD CALC: 41.2 % (ref 34.1–44.9)
HEMOGLOBIN: 12.8 G/DL (ref 11.2–15.7)
LACTATE DEHYDROGENASE: 365 U/L (ref 313–618)
LYMPHOCYTES ABSOLUTE: 2.05 K/UL (ref 1.18–3.74)
LYMPHOCYTES RELATIVE PERCENT: 22 % (ref 19.3–53.1)
MCH RBC QN AUTO: 31.7 PG (ref 25.6–32.2)
MCHC RBC AUTO-ENTMCNC: 31.1 G/DL (ref 32.3–35.5)
MCV RBC AUTO: 102 FL (ref 79.4–94.8)
MONOCYTES ABSOLUTE: 0.92 K/UL (ref 0.24–0.82)
MONOCYTES RELATIVE PERCENT: 9.9 % (ref 4.7–12.5)
NEUTROPHILS ABSOLUTE: 5.61 K/UL (ref 1.56–6.13)
NEUTROPHILS RELATIVE PERCENT: 60.4 % (ref 34–71.1)
PDW BLD-RTO: 12.4 % (ref 11.7–14.4)
PLATELET # BLD: 185 K/UL (ref 182–369)
PMV BLD AUTO: 10.6 FL (ref 7.4–10.4)
POTASSIUM SERPL-SCNC: 4.9 MMOL/L (ref 3.5–5.1)
RBC # BLD: 4.04 M/UL (ref 3.93–5.22)
SODIUM BLD-SCNC: 144 MMOL/L (ref 137–145)
TOTAL PROTEIN: 8.2 G/DL (ref 6.3–8.2)
WBC # BLD: 9.3 K/UL (ref 3.98–10.04)

## 2022-08-11 PROCEDURE — 36415 COLL VENOUS BLD VENIPUNCTURE: CPT | Performed by: PHYSICIAN ASSISTANT

## 2022-08-11 PROCEDURE — 80053 COMPREHEN METABOLIC PANEL: CPT

## 2022-08-11 PROCEDURE — 99212 OFFICE O/P EST SF 10 MIN: CPT

## 2022-08-11 PROCEDURE — G8400 PT W/DXA NO RESULTS DOC: HCPCS | Performed by: PHYSICIAN ASSISTANT

## 2022-08-11 PROCEDURE — G8417 CALC BMI ABV UP PARAM F/U: HCPCS | Performed by: PHYSICIAN ASSISTANT

## 2022-08-11 PROCEDURE — 85025 COMPLETE CBC W/AUTO DIFF WBC: CPT

## 2022-08-11 PROCEDURE — 1036F TOBACCO NON-USER: CPT | Performed by: PHYSICIAN ASSISTANT

## 2022-08-11 PROCEDURE — 36415 COLL VENOUS BLD VENIPUNCTURE: CPT

## 2022-08-11 PROCEDURE — 1123F ACP DISCUSS/DSCN MKR DOCD: CPT | Performed by: PHYSICIAN ASSISTANT

## 2022-08-11 PROCEDURE — 1090F PRES/ABSN URINE INCON ASSESS: CPT | Performed by: PHYSICIAN ASSISTANT

## 2022-08-11 PROCEDURE — G8427 DOCREV CUR MEDS BY ELIG CLIN: HCPCS | Performed by: PHYSICIAN ASSISTANT

## 2022-08-11 PROCEDURE — 99213 OFFICE O/P EST LOW 20 MIN: CPT | Performed by: PHYSICIAN ASSISTANT

## 2022-08-11 PROCEDURE — 3017F COLORECTAL CA SCREEN DOC REV: CPT | Performed by: PHYSICIAN ASSISTANT

## 2022-08-11 PROCEDURE — 83615 LACTATE (LD) (LDH) ENZYME: CPT

## 2022-08-11 ASSESSMENT — ENCOUNTER SYMPTOMS
EYE ITCHING: 0
DIARRHEA: 0
WHEEZING: 0
PHOTOPHOBIA: 0
ABDOMINAL DISTENTION: 0
EYE DISCHARGE: 0
TROUBLE SWALLOWING: 0
ABDOMINAL PAIN: 0
COLOR CHANGE: 0
VOICE CHANGE: 0
SORE THROAT: 0
VOMITING: 0
CONSTIPATION: 0
BLOOD IN STOOL: 0
BACK PAIN: 0
NAUSEA: 0
SHORTNESS OF BREATH: 1
COUGH: 0

## 2022-08-16 LAB
+IMM: ABNORMAL
ALBUMIN SERPL-MCNC: 3.72 G/DL (ref 3.75–5.01)
ALPHA-1-GLOBULIN: 0.41 G/DL (ref 0.19–0.46)
ALPHA-2-GLOBULIN: 1.01 G/DL (ref 0.48–1.05)
BETA GLOBULIN: 0.94 G/DL (ref 0.48–1.1)
GAMMA GLOBULIN: 1.62 G/DL (ref 0.62–1.51)
IGA: 252 MG/DL (ref 68–408)
IGG: 1364 MG/DL (ref 768–1632)
IGM: 503 MG/DL (ref 35–263)
KAPPA FREE LIGHT CHAINS QNT: 181.36 MG/L (ref 3.3–19.4)
KAPPA/LAMBDA FREE LIGHT CHAIN RATIO: 3.18 (ref 0.26–1.65)
LAMBDA FREE LIGHT CHAINS QNT: 57 MG/L (ref 5.71–26.3)
SPE/IFE INTERPRETATION: ABNORMAL
TOTAL PROTEIN: 7.7 G/DL (ref 6.3–8.2)

## 2022-08-25 ENCOUNTER — HOSPITAL ENCOUNTER (OUTPATIENT)
Dept: INFUSION THERAPY | Age: 72
Discharge: HOME | End: 2022-08-25
Payer: MEDICARE

## 2022-08-25 DIAGNOSIS — E83.52 SERUM CALCIUM ELEVATED: ICD-10-CM

## 2022-08-25 DIAGNOSIS — D47.2 MGUS (MONOCLONAL GAMMOPATHY OF UNKNOWN SIGNIFICANCE): ICD-10-CM

## 2022-08-25 DIAGNOSIS — D75.89 MACROCYTOSIS: ICD-10-CM

## 2022-08-25 LAB
ALBUMIN SERPL-MCNC: 4.2 G/DL (ref 3.5–5.2)
ALP BLD-CCNC: 111 U/L (ref 35–104)
ALT SERPL-CCNC: 16 U/L (ref 9–52)
ANION GAP SERPL CALCULATED.3IONS-SCNC: 8 MMOL/L (ref 7–19)
AST SERPL-CCNC: 27 U/L (ref 14–36)
BILIRUB SERPL-MCNC: 0.5 MG/DL (ref 0.2–1.3)
BUN BLDV-MCNC: 30 MG/DL (ref 7–17)
CALCIUM IONIZED: 1.18 MMOL/L (ref 1.12–1.32)
CALCIUM SERPL-MCNC: 9.4 MG/DL (ref 8.4–10.2)
CHLORIDE BLD-SCNC: 107 MMOL/L (ref 98–111)
CO2: 26 MMOL/L (ref 22–29)
CREAT SERPL-MCNC: 1.3 MG/DL (ref 0.5–1)
GFR NON-AFRICAN AMERICAN: 40
GLOBULIN: 3.2 G/DL
GLUCOSE BLD-MCNC: 108 MG/DL (ref 74–106)
HCT VFR BLD CALC: 37 % (ref 34.1–44.9)
HEMOGLOBIN: 12 G/DL (ref 11.2–15.7)
LYMPHOCYTES ABSOLUTE: 1.67 K/UL (ref 1.18–3.74)
LYMPHOCYTES RELATIVE PERCENT: 23.6 % (ref 19.3–53.1)
MCH RBC QN AUTO: 31.8 PG (ref 25.6–32.2)
MCHC RBC AUTO-ENTMCNC: 32.4 G/DL (ref 32.3–35.5)
MCV RBC AUTO: 98.1 FL (ref 79.4–94.8)
MONOCYTES ABSOLUTE: 0.6 K/UL (ref 0.24–0.82)
MONOCYTES RELATIVE PERCENT: 8.5 % (ref 4.7–12.5)
NEUTROPHILS ABSOLUTE: 4.16 K/UL (ref 1.56–6.13)
NEUTROPHILS RELATIVE PERCENT: 58.6 % (ref 34–71.1)
PARATHYROID HORMONE INTACT: 25.1 PG/ML (ref 15–65)
PDW BLD-RTO: 12.4 % (ref 11.7–14.4)
PLATELET # BLD: 204 K/UL (ref 182–369)
PMV BLD AUTO: 10.7 FL (ref 7.4–10.4)
POTASSIUM SERPL-SCNC: 5 MMOL/L (ref 3.5–5.1)
RBC # BLD: 3.77 M/UL (ref 3.93–5.22)
SODIUM BLD-SCNC: 141 MMOL/L (ref 137–145)
TOTAL PROTEIN: 7.5 G/DL (ref 6.3–8.2)
WBC # BLD: 7.09 K/UL (ref 3.98–10.04)

## 2022-08-25 PROCEDURE — 82330 ASSAY OF CALCIUM: CPT

## 2022-08-25 PROCEDURE — 80053 COMPREHEN METABOLIC PANEL: CPT

## 2022-08-25 PROCEDURE — 36415 COLL VENOUS BLD VENIPUNCTURE: CPT

## 2022-08-25 PROCEDURE — 85025 COMPLETE CBC W/AUTO DIFF WBC: CPT

## 2022-11-04 DIAGNOSIS — N39.41 URGE INCONTINENCE: ICD-10-CM

## 2022-11-04 DIAGNOSIS — N32.81 OVERACTIVE BLADDER: ICD-10-CM

## 2022-11-04 RX ORDER — OXYBUTYNIN CHLORIDE 10 MG/1
10 TABLET, EXTENDED RELEASE ORAL DAILY
Qty: 90 TABLET | Refills: 1 | Status: SHIPPED | OUTPATIENT
Start: 2022-11-04

## 2022-12-13 ENCOUNTER — OFFICE VISIT (OUTPATIENT)
Dept: INTERNAL MEDICINE | Facility: CLINIC | Age: 72
End: 2022-12-13

## 2022-12-13 VITALS
SYSTOLIC BLOOD PRESSURE: 130 MMHG | RESPIRATION RATE: 16 BRPM | TEMPERATURE: 97.8 F | HEIGHT: 63 IN | OXYGEN SATURATION: 98 % | HEART RATE: 70 BPM | DIASTOLIC BLOOD PRESSURE: 80 MMHG | WEIGHT: 250.4 LBS | BODY MASS INDEX: 44.37 KG/M2

## 2022-12-13 DIAGNOSIS — N18.32 STAGE 3B CHRONIC KIDNEY DISEASE: ICD-10-CM

## 2022-12-13 DIAGNOSIS — E66.01 CLASS 3 SEVERE OBESITY DUE TO EXCESS CALORIES WITH SERIOUS COMORBIDITY AND BODY MASS INDEX (BMI) OF 40.0 TO 44.9 IN ADULT: ICD-10-CM

## 2022-12-13 DIAGNOSIS — I10 ESSENTIAL HYPERTENSION: ICD-10-CM

## 2022-12-13 DIAGNOSIS — R73.02 IMPAIRED GLUCOSE TOLERANCE: ICD-10-CM

## 2022-12-13 DIAGNOSIS — Z23 NEED FOR VACCINATION: ICD-10-CM

## 2022-12-13 DIAGNOSIS — M85.852 OSTEOPENIA OF LEFT FEMORAL NECK: Primary | ICD-10-CM

## 2022-12-13 DIAGNOSIS — F33.41 RECURRENT MAJOR DEPRESSIVE DISORDER, IN PARTIAL REMISSION: ICD-10-CM

## 2022-12-13 PROCEDURE — 1126F AMNT PAIN NOTED NONE PRSNT: CPT | Performed by: INTERNAL MEDICINE

## 2022-12-13 PROCEDURE — 1170F FXNL STATUS ASSESSED: CPT | Performed by: INTERNAL MEDICINE

## 2022-12-13 PROCEDURE — 1160F RVW MEDS BY RX/DR IN RCRD: CPT | Performed by: INTERNAL MEDICINE

## 2022-12-13 PROCEDURE — G0439 PPPS, SUBSEQ VISIT: HCPCS | Performed by: INTERNAL MEDICINE

## 2022-12-13 PROCEDURE — 1159F MED LIST DOCD IN RCRD: CPT | Performed by: INTERNAL MEDICINE

## 2022-12-13 PROCEDURE — G0008 ADMIN INFLUENZA VIRUS VAC: HCPCS | Performed by: INTERNAL MEDICINE

## 2022-12-13 PROCEDURE — 90686 IIV4 VACC NO PRSV 0.5 ML IM: CPT | Performed by: INTERNAL MEDICINE

## 2022-12-13 PROCEDURE — 99213 OFFICE O/P EST LOW 20 MIN: CPT | Performed by: INTERNAL MEDICINE

## 2022-12-13 RX ORDER — LISINOPRIL AND HYDROCHLOROTHIAZIDE 20; 12.5 MG/1; MG/1
1 TABLET ORAL 2 TIMES DAILY
Qty: 180 TABLET | Refills: 3 | Status: SHIPPED | OUTPATIENT
Start: 2022-12-13

## 2022-12-13 RX ORDER — CALCIUM CARBONATE/VITAMIN D3 600 MG-10
1 TABLET ORAL DAILY
Qty: 90 TABLET | Refills: 3 | Status: SHIPPED | OUTPATIENT
Start: 2022-12-13

## 2022-12-13 RX ORDER — ESCITALOPRAM OXALATE 10 MG/1
10 TABLET ORAL DAILY
Qty: 90 TABLET | Refills: 3 | Status: SHIPPED | OUTPATIENT
Start: 2022-12-13

## 2022-12-13 RX ORDER — CIPROFLOXACIN 500 MG/1
1 TABLET, FILM COATED ORAL DAILY
COMMUNITY
Start: 2022-12-12

## 2022-12-13 NOTE — PATIENT INSTRUCTIONS
Medicare Wellness  Personal Prevention Plan of Service     Date of Office Visit:    Encounter Provider:  Michael Singleton DO  Place of Service:  [facility]  Patient Name: Sarah Littlejohn  :  1950    As part of the Medicare Wellness portion of your visit today, we are providing you with this personalized preventive plan of services (PPPS). This plan is based upon recommendations of the [place] Preventive Services Task Force (USPSTF) and the Advisory Committee on Immunization Practices (ACIP).    This lists the preventive care services that should be considered, and provides dates of when you are due. Items listed as completed are up-to-date and do not require any further intervention.    Health Maintenance   Topic Date Due    TDAP/TD VACCINES (1 - Tdap) Never done    ZOSTER VACCINE (1 of 2) Never done    COVID-19 Vaccine (4 - Booster for Moderna series) 2022    INFLUENZA VACCINE  2022    ANNUAL WELLNESS VISIT  2022    MAMMOGRAM  2024    DXA SCAN  2024    COLORECTAL CANCER SCREENING  2026    HEPATITIS C SCREENING  Completed    Pneumococcal Vaccine 65+  Completed    PAP SMEAR  Discontinued    HEMOGLOBIN A1C  Discontinued       Orders Placed This Encounter   Procedures    FluLaval/Fluzone >6 mos ([id number])       Return in about 6 months (around 2023) for Recheck.

## 2022-12-13 NOTE — PROGRESS NOTES
The ABCs of the Annual Wellness Visit  Subsequent Medicare Wellness Visit    Subjective    Sarah Littlejohn is a 72 y.o. female who presents for a Subsequent Medicare Wellness Visit.    The following portions of the patient's history were reviewed and   updated as appropriate: allergies, current medications, past family history, past medical history, past social history, past surgical history and problem list.    Compared to one year ago, the patient feels her physical   health is better.    Compared to one year ago, the patient feels her mental   health is better.    Recent Hospitalizations:  She was not admitted to the hospital during the last year.       Current Medical Providers:  Patient Care Team:  Michael Singleton DO as PCP - General (Internal Medicine)  Dewayne Santiago MD as Consulting Physician (Nephrology)  Jose Zeng MD as Consulting Physician (Oncology)  Marek Wayne MD as Consulting Physician (Dermatology)  Hannah Marvin MD as Consulting Physician (Ophthalmology)    Outpatient Medications Prior to Visit   Medication Sig Dispense Refill   • allopurinol (ZYLOPRIM) 100 MG tablet Take 100 mg by mouth Daily.     • oxybutynin XL (DITROPAN-XL) 10 MG 24 hr tablet Take 1 tablet by mouth Daily. 90 tablet 1   • Pseudoephedrine-Acetaminophen (SINUS PO) Take 1 tablet by mouth Daily.     • Calcium Carbonate-Vitamin D3 (Calcium 600+D) 600-400 MG-UNIT tablet Take 1 tablet by mouth Daily. 90 tablet 3   • escitalopram (LEXAPRO) 10 MG tablet Take 1 tablet by mouth Daily. 90 tablet 3   • lisinopril-hydrochlorothiazide (PRINZIDE,ZESTORETIC) 20-12.5 MG per tablet Take 1 tablet by mouth 2 (Two) Times a Day. 180 tablet 3   • multivitamin with minerals tablet tablet Take 1 tablet by mouth Daily.     • ciprofloxacin (CIPRO) 500 MG tablet Take 1 tablet by mouth Daily.       No facility-administered medications prior to visit.       No opioid medication identified on active medication  "list. I have reviewed chart for other potential  high risk medication/s and harmful drug interactions in the elderly.          Aspirin is not on active medication list.  Aspirin use is not indicated based on review of current medical condition/s. Risk of harm outweighs potential benefits.  .    Patient Active Problem List   Diagnosis   • Essential hypertension   • Class 3 severe obesity due to excess calories with serious comorbidity and body mass index (BMI) of 40.0 to 44.9 in adult ([facility])   • Stage 3b chronic kidney disease (CMS/[facility])   • Recurrent major depressive disorder, in full remission ([facility])   • MGUS (monoclonal gammopathy of unknown significance)   • Impaired glucose tolerance   • Urge incontinence   • Asymptomatic hyperuricemia   • SOB (shortness of breath) on exertion   • Pleural effusion on right     Advance Care Planning  Advance Directive is not on file.  ACP discussion was held with the patient during this visit. Patient has an advance directive (not in EMR), copy requested.     Objective    Vitals:    12/13/22 1058   BP: 130/80   BP Location: Left arm   Patient Position: Sitting   Cuff Size: Adult   Pulse: 70   Resp: 16   Temp: 97.8 °F (36.6 °C)   SpO2: 98%   Weight: 114 kg (250 lb 6.4 oz)   Height: 160 cm (62.99\")     Estimated body mass index is 44.37 kg/m² as calculated from the following:    Height as of this encounter: 160 cm (62.99\").    Weight as of this encounter: 114 kg (250 lb 6.4 oz).    Class 3 Severe Obesity (BMI >=40). Obesity-related health conditions include the following: hypertension and dyslipidemias. Obesity is improving with lifestyle modifications. BMI is is above average; BMI management plan is completed. We discussed portion control and increasing exercise.      Does the patient have evidence of cognitive impairment? No          HEALTH RISK ASSESSMENT    Smoking Status:  Social History     Tobacco Use   Smoking Status Never   Smokeless Tobacco Never     Alcohol " Consumption:  Social History     Substance and Sexual Activity   Alcohol Use No     Fall Risk Screen:    STEADI Fall Risk Assessment was completed, and patient is at LOW risk for falls.Assessment completed on:12/13/2022    Depression Screening:  PHQ-2/PHQ-9 Depression Screening 12/13/2022   Retired PHQ-9 Total Score -   Retired Total Score -   Little Interest or Pleasure in Doing Things 0-->not at all   Feeling Down, Depressed or Hopeless 0-->not at all   PHQ-9: Brief Depression Severity Measure Score 0       Health Habits and Functional and Cognitive Screening:  Functional & Cognitive Status 12/13/2022   Do you have difficulty preparing food and eating? No   Do you have difficulty bathing yourself, getting dressed or grooming yourself? No   Do you have difficulty using the toilet? No   Do you have difficulty moving around from place to place? No   Do you have trouble with steps or getting out of a bed or a chair? No   Current Diet Unhealthy Diet   Dental Exam Not up to date   Eye Exam Not up to date   Exercise (times per week) 0 times per week   Current Exercises Include No Regular Exercise   Current Exercise Activities Include -   Do you need help using the phone?  No   Are you deaf or do you have serious difficulty hearing?  No   Do you need help with transportation? No   Do you need help shopping? No   Do you need help preparing meals?  No   Do you need help with housework?  No   Do you need help with laundry? No   Do you need help taking your medications? No   Do you need help managing money? No   Do you ever drive or ride in a car without wearing a seat belt? No   Have you felt unusual stress, anger or loneliness in the last month? No   Who do you live with? Spouse   If you need help, do you have trouble finding someone available to you? No   Have you been bothered in the last four weeks by sexual problems? No   Do you have difficulty concentrating, remembering or making decisions? No       Age-appropriate  "Screening Schedule:  Refer to the list below for future screening recommendations based on patient's age, sex and/or medical conditions. Orders for these recommended tests are listed in the plan section. The patient has been provided with a written plan.    Health Maintenance   Topic Date Due   • TDAP/TD VACCINES (1 - Tdap) Never done   • ZOSTER VACCINE (1 of 2) Never done   • MAMMOGRAM  07/22/2024   • DXA SCAN  07/22/2024   • INFLUENZA VACCINE  Completed   • PAP SMEAR  Discontinued   • HEMOGLOBIN A1C  Discontinued                CMS Preventative Services Quick Reference  Risk Factors Identified During Encounter  Depression/Dysphoria: Current medication is effective, no change recommended  Immunizations Discussed/Encouraged: Tdap, Influenza, Shingrix and COVID19  The above risks/problems have been discussed with the patient.  Pertinent information has been shared with the patient in the After Visit Summary.  An After Visit Summary and PPPS were made available to the patient.    Follow Up:   Next Medicare Wellness visit to be scheduled in 1 year.       Additional E&M Note during same encounter follows:  Patient has multiple medical problems which are significant and separately identifiable that require additional work above and beyond the Medicare Wellness Visit.      Chief Complaint  osteopenia    Subjective        HPI  Sarah Littlejohn is also being seen today for osteopenia. She had previous DEXA showing low bone density, but she has not done anything for this to date. She has fortunately not had any fall nor fracture.     Review of Systems   Respiratory: Negative for shortness of breath.    Cardiovascular: Negative for chest pain.       Objective   Vital Signs:  /80 (BP Location: Left arm, Patient Position: Sitting, Cuff Size: Adult)   Pulse 70   Temp 97.8 °F (36.6 °C)   Resp 16   Ht 160 cm (62.99\")   Wt 114 kg (250 lb 6.4 oz)   SpO2 98%   BMI 44.37 kg/m²     Physical Exam  Constitutional:  "      General: She is not in acute distress.  Pulmonary:      Effort: Pulmonary effort is normal. No respiratory distress.   Neurological:      Mental Status: She is alert and oriented to person, place, and time.                         Assessment and Plan   Diagnoses and all orders for this visit:    1. Osteopenia of left femoral neck (Primary)  -     Calcium Carb-Cholecalciferol 600-10 MG-MCG tablet; Take 1 tablet by mouth Daily.  Dispense: 90 tablet; Refill: 3  Recommend calcium + D to assist with healthy bone turnover. WIll monitor with DEXA q2y.   DEXA Bone Density Axial (07/22/2022 11:41)     2. Recurrent major depressive disorder, in partial remission (HCC)  -     escitalopram (LEXAPRO) 10 MG tablet; Take 1 tablet by mouth Daily.  Dispense: 90 tablet; Refill: 3  Stable on SSRI without need for changes in her estimation.     3. Essential hypertension  -     lisinopril-hydrochlorothiazide (PRINZIDE,ZESTORETIC) 20-12.5 MG per tablet; Take 1 tablet by mouth 2 (Two) Times a Day.  Dispense: 180 tablet; Refill: 3  Well controlled, BP goal for age is <140/90 per JNC 8 guidelines and continue current medications    4. Impaired glucose tolerance  Will follow with labs and encourage control of diet and exercise.     5. Stage 3b chronic kidney disease (CMS/HCC)  Stable on RAAS blockade without changes in urinary quality or quantity.     6. Class 3 severe obesity due to excess calories with serious comorbidity and body mass index (BMI) of 40.0 to 44.9 in adult ([facility])    7. Need for vaccination  -     FluLaval/Fluzone >6 mos ([id number])             Follow Up   Return in about 6 months (around 6/13/2023) for Recheck.  Patient was given instructions and counseling regarding her condition or for health maintenance advice. Please see specific information pulled into the AVS if appropriate.

## 2023-02-09 NOTE — PROGRESS NOTES
Progress Note      Pt Name: Sergo Mayes  YOB: 1950  MRN: [id number]    Date of evaluation: 2/10/2023  History Obtained From:  patient, electronic medical record    CHIEF COMPLAINT:    Chief Complaint   Patient presents with    Follow-up     MGUS (monoclonal gammopathy of unknown significance)       Current active problems  IgM MGUS    HISTORY OF PRESENT ILLNESS:    Sergo Mayes is a 68 y.o.  female with IgM MGUS followed in the office since 12/15/2016. She continues to be followed with observation only. She denies any hospitalization or new medical issues since she was last seen in the office. She is now on calcium and vitamin D for osteopenia. She reports that her renal function-GFR was about 39% last time she saw Dr. Lay Givens. She denies any night sweats, weight loss, excessive fatigue, pruritus. She denies feeling any lymphadenopathy. She continues on Lexapro 10 mg daily for anxiety which is stable. She continues taking allopurinol 100 mg daily for hyperuricemia. She does take melatonin for her sleep at night. Blood pressure is stable on her lisinopril 20 daily. HEMATOLOGY HISTORY: IgM-MGUS 1/23/2017  Lesli Carrion was seen in initial hematology consultation on 12/15/2016 referred by Dr. Lay Givens for an abnormal SPEP. SEROLOGY 7/21/2016  SPEP - M spike of 0.4 g/dL   QI - IgM of 331 ()    SEROLOGY 11/7/2016  SPEP - M spike of 0.6 g/dL    Her initial CBC in the office reveals a WBC of 6.05, Hgb 13.1 with .4 and PLT of 196,000.      BASELINE SEROLOGY 12/15/2016  B12 - 701   Folate - 12   QI - IgM 285 () with normal IgA and IgG   Free serum light chains - kappa 77.62, lambda 33.92, K/L ratio 2.29   CMP - creatinine 1.2 with GFR 47.9 otherwise normal    BASELINE URINE STUDIES:  24-hour urine for immunoelectrophoresis and immunofixation returned on 12/19/2016 revealed no monoclonal protein    BONE SURVEY AT [facility] ON 12/15/2016 was negative for any lytic lesions    Bone marrow aspiration and biopsy performed in [facility] on 1/23/2017   Normocellular (40-50%) with trilineage hematopoiesis and no evidence of dysplasia nor increase in blasts. There were adequate iron stores. There was no diagnostic evidence of plasma cell dyscrasia nor lymphoproliferative disorder. Molecular testing for B-cell gene rearrangements were negative. Cytogenetics were normal female karyotype. CTs of the soft tissue neck, chest, abdomen was performed on 2/9/2017 at [facility] which were unrevealing for any lymphadenopathy or suspicious lesions for lymphoplasmacytic lymphoma. There were 2 lesions noted in the right kidney that were most likely cyst with an ultrasound of the kidney performed on 2/16/2017 revealing these to be simple cysts. With CT scans being unrevealing her final diagnosis is that of an IgM MGUS. She will be followed up in the office for physical examinations and serologic testing periodically. With stable serology, frequency a followup will be decreased. TUMOR HISTORY: 7 mm RML nodule  Baseline CT scans without IV contrast were performed at [facility] for evaluation of her monoclonal gammopathy. The CT of the chest without contrast on 2/9/2017 revealed a 7 mm nodule in the RML. Follow-up CT of the chest without contrast was performed at [facility] on 5/25/2017 and compared to 2/9/2017. There was a 7 mm nodular density in the RML which appears to be an area of fibrosis. The 4 mm nodule in the RLL is stable and recommendations is to follow-up again in 6 months. CT of the chest on 12/4/2017 and compared to the prior study of 2/25/2017 which revealed a stable 5 mm groundglass nodule in the RLL. This had not changed since May 2017 with minimal increase in size since February 2008. Based on the size of the nodule and the Fleischner Society recommendations no further repeat CT scanning is required.         Past Medical History:   Diagnosis Date    Anxiety     Depression Diabetes mellitus ([facility] [address]     Hypertension     Hyperuricemia     Kidney failure     Renal cyst, right     Seasonal allergies         Past Surgical History:   Procedure Laterality Date    BONE MARROW BIOPSY N/A 1/23/2017    BONE MARROW ASPIRATION BIOPSY performed by Shannon Plummer MD at [facility] 19 Bilateral 2016    [address] [facility]           Current Outpatient Medications:     Calcium Carb-Cholecalciferol 500-10 MG-MCG CHEW, Take 1 capsule by mouth daily, Disp: , Rfl:     allopurinol (ZYLOPRIM) 100 MG tablet, Take 100 mg by mouth daily, Disp: , Rfl:     escitalopram (LEXAPRO) 10 MG tablet, Take 10 mg by mouth daily, Disp: , Rfl:     melatonin 5 MG TABS tablet, Take 2.5-10 mg by mouth nightly as needed, Disp: , Rfl:     lisinopril (PRINIVIL;ZESTRIL) 20 MG tablet, Take 20 mg by mouth daily, Disp: , Rfl:     oxybutynin (DITROPAN) 5 MG tablet, Take 5 mg by mouth daily, Disp: , Rfl:     Multiple Vitamins-Minerals (THERAPEUTIC MULTIVITAMIN-MINERALS) tablet, Take 1 tablet by mouth daily, Disp: , Rfl:      Allergies   Allergen Reactions    Neosporin [Neomycin-Polymyxin-Gramicidin] Dermatitis    Neomycin-Bacitracin Zn-Polymyx Rash    Penicillins Rash       Social History     Tobacco Use    Smoking status: Never    Smokeless tobacco: Never   Substance Use Topics    Alcohol use: Not Currently    Drug use: Never       Family History   Problem Relation Age of Onset    Other Mother         Lymphoma    Cancer Father         Leukemia       Subjective   REVIEW OF SYSTEMS:   Review of Systems   Constitutional:  Negative for chills, diaphoresis, fatigue, fever and unexpected weight change. HENT:  Negative for mouth sores, nosebleeds, sore throat, trouble swallowing and voice change. Eyes:  Negative for photophobia, discharge and itching. Respiratory:  Negative for cough, shortness of breath and wheezing.     Cardiovascular:  Negative for chest pain, palpitations and leg swelling. Gastrointestinal:  Negative for abdominal distention, abdominal pain, blood in stool, constipation, diarrhea, nausea and vomiting. Endocrine: Negative for cold intolerance, heat intolerance, polydipsia and polyuria. Genitourinary:  Negative for difficulty urinating, dysuria, hematuria and urgency. Musculoskeletal:  Positive for arthralgias. Negative for back pain, joint swelling and myalgias. Skin:  Negative for color change and rash. Allergic/Immunologic: Positive for environmental allergies (Pollen). Neurological:  Negative for dizziness, tremors, seizures, syncope and light-headedness. Hematological:  Negative for adenopathy. Does not bruise/bleed easily. Psychiatric/Behavioral:  Negative for behavioral problems and suicidal ideas. The patient is not nervous/anxious. Objective   /64   Pulse 72   Ht 5' 3\" (1.6 m)   Wt 255 lb 3.2 oz (115.8 kg)   SpO2 96%   BMI 45.21 kg/m²     PHYSICAL EXAM:  Physical Exam  Constitutional:       Appearance: She is well-developed. She is obese. HENT:      Head: Normocephalic and atraumatic. Eyes:      General: No scleral icterus. Conjunctiva/sclera: Conjunctivae normal.   Neck:      Trachea: No tracheal deviation. Cardiovascular:      Rate and Rhythm: Normal rate and regular rhythm. Heart sounds: Normal heart sounds. No murmur heard. Pulmonary:      Effort: Pulmonary effort is normal. No respiratory distress. Breath sounds: Normal breath sounds. Abdominal:      General: Bowel sounds are normal. There is no distension. Palpations: Abdomen is soft. Tenderness: There is no abdominal tenderness. Musculoskeletal:         General: No tenderness. Cervical back: Normal range of motion and neck supple. Skin:     General: Skin is warm and dry. Findings: No rash. Neurological:      Mental Status: She is alert and oriented to person, place, and time.       Coordination: Coordination normal.   Psychiatric:         Behavior: Behavior normal.         Thought Content: Thought content normal.       CBC reviewed by me  Lab Results   Component Value Date    WBC 7.15 02/10/2023    HGB 13.6 02/10/2023    HCT 43.1 02/10/2023    .5 (H) 02/10/2023     (L) 02/10/2023         VISIT DIAGNOSES  1. MGUS (monoclonal gammopathy of unknown significance)    2. Thrombocytopenia ([address]    3. Eosinophilia, unspecified type    4. Encounter for screening mammogram for malignant neoplasm of breast        ASSESSMENT/PLAN:    1. IgM MGUS. Serology 1/24/2019  Crt - 1.49 with GFR down improved to 36  Ca - WNL - 9.5  QI - IgM 316 - stable  SPEP - M spike of 0.4 g/dL - stable  Free serum light chains - kappa 96.2, lambda 32.9, K/L ratio 2.92 - all stable    Serology 2/28/2020  QI -IgG 1325, IgA 266, IgM 414 ()  SPEP - M spike 0.4 g/dL with immunofixation revealing IgM kappa  Free serum light chains - kappa 129.6, lambda 48.1, K/L ratio 2.69    Serology 6/26/2020  QI - IgG 1260, IgA 248, IgM 392  SPEP - M spike 0.6 g/dL with immunofixation revealing IgM kappa  Free serum light chains - kappa 143.5, lambda 42.7, K/L ratio 3.36  CMP - crt 1.6 with GFR 32    Serology 12/18/2020  QI - IgG 1270, IgA 245, IgM 390  SPEP - M spike 0.5 g/dL with immunofixation revealing IgM kappa  Free serum light chains - kappa 140.4, lambda 50.6, K/L ratio 2.77  B2M - 5.3  CMP - crt 1.5 with GFR 34, Ca 10.1    Serology 7/2/2021  SPEP -  M spike 0.5 g/dL with immunofixation revealing IgM kappa  QI - IgG 1341, IgA 252, IgM 450  Free serum light chains - Kappa 150.5, Lambda 59.7, K/L ratio 2.52  B2M - 7.1  CMP - crt 1.7 with GFR 30, Ca 9.6  LDH - 472 - WNL    CTA chest [facility] 12/13/2021 did not reveal any lymphadenopathy.     Serology 1/19/2022  SPEP - unable to quantify M spike IgM Kappa  QI - IgG 1580, IgA 277, IgM 394  Free serum light chains - Kappa 13.94, Lambda 6.6, K/L 2.11, L/K 0.47  LDH - 173    Serology 8/11/2022  SPEP M spike 0.48 g/dL IgM kappa  QI IgG 1364, IgA 252, IgM 503  Free serum light chains kappa 181.36, lambda 57, K/L ratio 3.18    CMP 8/25/2022 - crt 1.3 with GFR 40, Ca 9.4      C - ca 9.4 and normal on 8/25/2022  R - crt 1.3 with GFR 40 on 8/25/2022- Dr. Estela Kovacs  A - HGB 13.6 today  B - no bone pain or lesions    CBC today reveals WBC of 7.15 with 57.8% PMN, 24.3% lymphocyte, 8.1% monocyte. Hgb is 13.6, ,000. Physical examination is unrevealing for any palpable peripheral adenopathy. She does not have any B symptoms. Repeat monoclonal protein serology      2. Mild thrombocytopenia        PLT stable at 181,000      3. Eosinophilia. IgE - 28 (6 - 495)        WBC 7.15, 9.4% eosinophils absolute eosinophil count is only 0.67. HEALTH MAINTENANCE    Breast cancer screening. Bilateral screening mammogram 7/22/2022 at [facility] was BI-RADS 1. Mammogram will be arranged for this year. Colon cancer screening. Her colonoscopy is up-to-date and will not be due again until 2026    Cervical cancer screening. Pap smear 7/12/2019 was negative. Immunization History   Administered Date(s) Administered    COVID-19, PFIZER PURPLE top, DILUTE for use, (age 15 y+), 30mcg/0.3mL 12/09/2021           Orders Placed This Encounter   Procedures    FELIX DIGITAL SCREEN W OR WO CAD BILATERAL    Comprehensive Metabolic Panel    Lactate Dehydrogenase    MONOCLONAL PROTEIN AND FLC, SERUM          Return in about 6 months (around 8/10/2023) for With [facility].      Mathew Nash PA-C  1:44 PM  2/10/2023

## 2023-02-10 ENCOUNTER — OFFICE VISIT (OUTPATIENT)
Dept: HEMATOLOGY | Age: 73
End: 2023-02-10
Payer: MEDICARE

## 2023-02-10 ENCOUNTER — HOSPITAL ENCOUNTER (OUTPATIENT)
Dept: INFUSION THERAPY | Age: 73
Discharge: HOME | End: 2023-02-10
Payer: MEDICARE

## 2023-02-10 ENCOUNTER — TRANSCRIBE ORDERS (OUTPATIENT)
Dept: ADMINISTRATIVE | Facility: HOSPITAL | Age: 73
End: 2023-02-10
Payer: MEDICARE

## 2023-02-10 VITALS
DIASTOLIC BLOOD PRESSURE: 64 MMHG | HEIGHT: 63 IN | SYSTOLIC BLOOD PRESSURE: 124 MMHG | OXYGEN SATURATION: 96 % | WEIGHT: 255.2 LBS | HEART RATE: 72 BPM | BODY MASS INDEX: 45.22 KG/M2

## 2023-02-10 DIAGNOSIS — Z12.31 ENCOUNTER FOR SCREENING MAMMOGRAM FOR MALIGNANT NEOPLASM OF BREAST: ICD-10-CM

## 2023-02-10 DIAGNOSIS — D69.6 THROMBOCYTOPENIA (HCC): ICD-10-CM

## 2023-02-10 DIAGNOSIS — D47.2 MGUS (MONOCLONAL GAMMOPATHY OF UNKNOWN SIGNIFICANCE): Primary | ICD-10-CM

## 2023-02-10 DIAGNOSIS — D75.89 MACROCYTOSIS: ICD-10-CM

## 2023-02-10 DIAGNOSIS — D47.2 MGUS (MONOCLONAL GAMMOPATHY OF UNKNOWN SIGNIFICANCE): ICD-10-CM

## 2023-02-10 DIAGNOSIS — Z12.31 ENCOUNTER FOR SCREENING MAMMOGRAM FOR MALIGNANT NEOPLASM OF BREAST: Primary | ICD-10-CM

## 2023-02-10 DIAGNOSIS — D72.10 EOSINOPHILIA, UNSPECIFIED TYPE: ICD-10-CM

## 2023-02-10 LAB
ALBUMIN SERPL-MCNC: 4.2 G/DL (ref 3.5–5.2)
ALP BLD-CCNC: 104 U/L (ref 35–104)
ALT SERPL-CCNC: 20 U/L (ref 9–52)
ANION GAP SERPL CALCULATED.3IONS-SCNC: 5 MMOL/L (ref 7–19)
AST SERPL-CCNC: 27 U/L (ref 14–36)
BASOPHILS ABSOLUTE: 0.02 K/UL (ref 0.01–0.08)
BASOPHILS RELATIVE PERCENT: 0.3 % (ref 0.1–1.2)
BILIRUB SERPL-MCNC: 0.6 MG/DL (ref 0.2–1.3)
BUN BLDV-MCNC: 32 MG/DL (ref 7–17)
CALCIUM SERPL-MCNC: 9.8 MG/DL (ref 8.4–10.2)
CHLORIDE BLD-SCNC: 111 MMOL/L (ref 98–111)
CO2: 27 MMOL/L (ref 22–29)
CREAT SERPL-MCNC: 1.6 MG/DL (ref 0.5–1)
EOSINOPHILS ABSOLUTE: 0.67 K/UL (ref 0.04–0.54)
EOSINOPHILS RELATIVE PERCENT: 9.4 % (ref 0.7–7)
GFR SERPL CREATININE-BSD FRML MDRD: 34 ML/MIN/{1.73_M2}
GLUCOSE BLD-MCNC: 215 MG/DL (ref 74–106)
HCT VFR BLD CALC: 43.1 % (ref 34.1–44.9)
HEMOGLOBIN: 13.6 G/DL (ref 11.2–15.7)
LACTATE DEHYDROGENASE: 164 U/L (ref 120–246)
LYMPHOCYTES ABSOLUTE: 1.74 K/UL (ref 1.18–3.74)
LYMPHOCYTES RELATIVE PERCENT: 24.3 % (ref 19.3–53.1)
MCH RBC QN AUTO: 31.7 PG (ref 25.6–32.2)
MCHC RBC AUTO-ENTMCNC: 31.6 G/DL (ref 32.3–35.5)
MCV RBC AUTO: 100.5 FL (ref 79.4–94.8)
MONOCYTES ABSOLUTE: 0.58 K/UL (ref 0.24–0.82)
MONOCYTES RELATIVE PERCENT: 8.1 % (ref 4.7–12.5)
NEUTROPHILS ABSOLUTE: 4.13 K/UL (ref 1.56–6.13)
NEUTROPHILS RELATIVE PERCENT: 57.8 % (ref 34–71.1)
PDW BLD-RTO: 12.5 % (ref 11.7–14.4)
PLATELET # BLD: 181 K/UL (ref 182–369)
PMV BLD AUTO: 10.5 FL (ref 7.4–10.4)
POTASSIUM SERPL-SCNC: 5.4 MMOL/L (ref 3.5–5.1)
RBC # BLD: 4.29 M/UL (ref 3.93–5.22)
SODIUM BLD-SCNC: 143 MMOL/L (ref 137–145)
TOTAL PROTEIN: 7 G/DL (ref 6.3–8.2)
WBC # BLD: 7.15 K/UL (ref 3.98–10.04)

## 2023-02-10 PROCEDURE — G8400 PT W/DXA NO RESULTS DOC: HCPCS | Performed by: PHYSICIAN ASSISTANT

## 2023-02-10 PROCEDURE — 36415 COLL VENOUS BLD VENIPUNCTURE: CPT

## 2023-02-10 PROCEDURE — G8417 CALC BMI ABV UP PARAM F/U: HCPCS | Performed by: PHYSICIAN ASSISTANT

## 2023-02-10 PROCEDURE — 83615 LACTATE (LD) (LDH) ENZYME: CPT

## 2023-02-10 PROCEDURE — 1090F PRES/ABSN URINE INCON ASSESS: CPT | Performed by: PHYSICIAN ASSISTANT

## 2023-02-10 PROCEDURE — G8484 FLU IMMUNIZE NO ADMIN: HCPCS | Performed by: PHYSICIAN ASSISTANT

## 2023-02-10 PROCEDURE — 1123F ACP DISCUSS/DSCN MKR DOCD: CPT | Performed by: PHYSICIAN ASSISTANT

## 2023-02-10 PROCEDURE — 1036F TOBACCO NON-USER: CPT | Performed by: PHYSICIAN ASSISTANT

## 2023-02-10 PROCEDURE — 99213 OFFICE O/P EST LOW 20 MIN: CPT | Performed by: PHYSICIAN ASSISTANT

## 2023-02-10 PROCEDURE — 85025 COMPLETE CBC W/AUTO DIFF WBC: CPT

## 2023-02-10 PROCEDURE — 3017F COLORECTAL CA SCREEN DOC REV: CPT | Performed by: PHYSICIAN ASSISTANT

## 2023-02-10 PROCEDURE — 99212 OFFICE O/P EST SF 10 MIN: CPT

## 2023-02-10 PROCEDURE — 80053 COMPREHEN METABOLIC PANEL: CPT

## 2023-02-10 PROCEDURE — G8427 DOCREV CUR MEDS BY ELIG CLIN: HCPCS | Performed by: PHYSICIAN ASSISTANT

## 2023-02-10 RX ORDER — CALCIUM CARBONATE/VITAMIN D3 500 MG-10
1 TABLET,CHEWABLE ORAL DAILY
COMMUNITY

## 2023-02-10 ASSESSMENT — ENCOUNTER SYMPTOMS
PHOTOPHOBIA: 0
CONSTIPATION: 0
VOICE CHANGE: 0
ABDOMINAL DISTENTION: 0
TROUBLE SWALLOWING: 0
WHEEZING: 0
SORE THROAT: 0
DIARRHEA: 0
SHORTNESS OF BREATH: 0
NAUSEA: 0
EYE ITCHING: 0
VOMITING: 0
ABDOMINAL PAIN: 0
COLOR CHANGE: 0
COUGH: 0
EYE DISCHARGE: 0
BACK PAIN: 0
BLOOD IN STOOL: 0

## 2023-02-15 DIAGNOSIS — E87.5 HYPERKALEMIA: Primary | ICD-10-CM

## 2023-02-21 ENCOUNTER — LAB (OUTPATIENT)
Dept: LAB | Facility: HOSPITAL | Age: 73
End: 2023-02-21
Payer: MEDICARE

## 2023-02-21 DIAGNOSIS — E87.5 HYPERKALEMIA: ICD-10-CM

## 2023-02-21 LAB
ANION GAP SERPL CALCULATED.3IONS-SCNC: 12 MMOL/L (ref 5–15)
BACTERIA: NEGATIVE /HPF
BILIRUBIN URINE: NEGATIVE
BLOOD, URINE: ABNORMAL
BUN SERPL-MCNC: 24 MG/DL (ref 8–23)
BUN/CREAT SERPL: 17.1 (ref 7–25)
CALCIUM SPEC-SCNC: 9 MG/DL (ref 8.6–10.5)
CHLORIDE SERPL-SCNC: 104 MMOL/L (ref 98–107)
CLARITY: CLEAR
CO2 SERPL-SCNC: 26 MMOL/L (ref 22–29)
COLOR: YELLOW
CREAT SERPL-MCNC: 1.4 MG/DL (ref 0.57–1)
CRYSTALS, UA: ABNORMAL /HPF
EGFRCR SERPLBLD CKD-EPI 2021: 40.1 ML/MIN/1.73
EPITHELIAL CELLS, UA: 3 /HPF (ref 0–5)
GLUCOSE SERPL-MCNC: 132 MG/DL (ref 65–99)
GLUCOSE URINE: NEGATIVE MG/DL
HYALINE CASTS: 2 /HPF (ref 0–8)
KETONES, URINE: NEGATIVE MG/DL
LEUKOCYTE ESTERASE, URINE: ABNORMAL
NITRITE, URINE: NEGATIVE
PH UA: 7 (ref 5–8)
POTASSIUM SERPL-SCNC: 4 MMOL/L (ref 3.5–5.2)
PROTEIN UA: NEGATIVE MG/DL
RBC UA: 5 /HPF (ref 0–4)
SODIUM SERPL-SCNC: 142 MMOL/L (ref 136–145)
SPECIFIC GRAVITY UA: 1.02 (ref 1–1.03)
UROBILINOGEN, URINE: 1 E.U./DL
WBC UA: 3 /HPF (ref 0–5)

## 2023-02-21 PROCEDURE — 80048 BASIC METABOLIC PNL TOTAL CA: CPT

## 2023-02-21 PROCEDURE — 36415 COLL VENOUS BLD VENIPUNCTURE: CPT

## 2023-06-14 ENCOUNTER — LAB (OUTPATIENT)
Dept: LAB | Facility: HOSPITAL | Age: 73
End: 2023-06-14
Payer: MEDICARE

## 2023-06-14 ENCOUNTER — OFFICE VISIT (OUTPATIENT)
Dept: INTERNAL MEDICINE | Facility: CLINIC | Age: 73
End: 2023-06-14
Payer: MEDICARE

## 2023-06-14 VITALS
SYSTOLIC BLOOD PRESSURE: 122 MMHG | HEIGHT: 63 IN | HEART RATE: 60 BPM | BODY MASS INDEX: 45.18 KG/M2 | OXYGEN SATURATION: 96 % | WEIGHT: 255 LBS | DIASTOLIC BLOOD PRESSURE: 68 MMHG

## 2023-06-14 DIAGNOSIS — I10 ESSENTIAL HYPERTENSION: Primary | ICD-10-CM

## 2023-06-14 DIAGNOSIS — Z00.00 PREVENTATIVE HEALTH CARE: ICD-10-CM

## 2023-06-14 DIAGNOSIS — M25.562 ACUTE PAIN OF LEFT KNEE: ICD-10-CM

## 2023-06-14 DIAGNOSIS — N18.32 STAGE 3B CHRONIC KIDNEY DISEASE: ICD-10-CM

## 2023-06-14 DIAGNOSIS — E66.01 CLASS 3 SEVERE OBESITY DUE TO EXCESS CALORIES WITH SERIOUS COMORBIDITY AND BODY MASS INDEX (BMI) OF 45.0 TO 49.9 IN ADULT: ICD-10-CM

## 2023-06-14 LAB
CHOLEST SERPL-MCNC: 154 MG/DL (ref 0–200)
HBA1C MFR BLD: 6 % (ref 4.8–5.6)
HDLC SERPL-MCNC: 46 MG/DL (ref 40–60)
LDLC SERPL CALC-MCNC: 87 MG/DL (ref 0–100)
LDLC/HDLC SERPL: 1.85 {RATIO}
TRIGL SERPL-MCNC: 115 MG/DL (ref 0–150)
VLDLC SERPL-MCNC: 21 MG/DL (ref 5–40)

## 2023-06-14 PROCEDURE — 83036 HEMOGLOBIN GLYCOSYLATED A1C: CPT

## 2023-06-14 PROCEDURE — 80061 LIPID PANEL: CPT

## 2023-06-14 PROCEDURE — 36415 COLL VENOUS BLD VENIPUNCTURE: CPT

## 2023-06-14 RX ORDER — LISINOPRIL AND HYDROCHLOROTHIAZIDE 20; 12.5 MG/1; MG/1
1 TABLET ORAL DAILY
Qty: 180 TABLET | Refills: 0
Start: 2023-06-14

## 2023-06-14 NOTE — PROGRESS NOTES
"Chief Complaint   Patient presents with    Hypertension        HPI     Sarah Littlejohn is a 73 y.o. female presents for follow up. She is doing well overall. She does mention left knee pain with swelling. Pain become severe and she went to the ER this week. X-ray showed no changes and ultrasound was negative. She says pain and swelling has improved since and is now mild. She takes tylenol as needed and uses ice.          ROS:  Review of Systems   Constitutional:  Negative for fever.   Respiratory: Negative.     Cardiovascular: Negative.    Musculoskeletal:  Positive for arthralgias.        reports that she has never smoked. She has never used smokeless tobacco. She reports that she does not drink alcohol and does not use drugs.    Current Outpatient Medications:     allopurinol (ZYLOPRIM) 100 MG tablet, Take 1 tablet by mouth Daily., Disp: , Rfl:     Calcium Carb-Cholecalciferol 600-10 MG-MCG tablet, Take 1 tablet by mouth Daily., Disp: 90 tablet, Rfl: 3    escitalopram (LEXAPRO) 10 MG tablet, Take 1 tablet by mouth Daily., Disp: 90 tablet, Rfl: 3    lisinopril-hydrochlorothiazide (PRINZIDE,ZESTORETIC) 20-12.5 MG per tablet, Take 1 tablet by mouth Daily., Disp: 180 tablet, Rfl: 0    Pseudoephedrine-Acetaminophen (SINUS PO), Take 1 tablet by mouth Daily., Disp: , Rfl:     OBJECTIVE:  /68 (BP Location: Right arm, Patient Position: Sitting, Cuff Size: Adult)   Pulse 60   Ht 160 cm (63\")   Wt 116 kg (255 lb)   SpO2 96%   BMI 45.17 kg/m²    Physical Exam  Constitutional:       General: She is not in acute distress.  Cardiovascular:      Rate and Rhythm: Normal rate and regular rhythm.      Heart sounds: Normal heart sounds.   Pulmonary:      Breath sounds: Normal breath sounds.   Abdominal:      General: Bowel sounds are normal.      Tenderness: There is no abdominal tenderness.   Musculoskeletal:         General: Swelling (mild edema to left knee) present.      Left knee: Tenderness present.        " Legs:       Comments: Tenderness localized below patella   Psychiatric:         Mood and Affect: Mood normal.         Behavior: Behavior normal.         Thought Content: Thought content normal.         Judgment: Judgment normal.       ASSESSMENT/PLAN:     Diagnoses and all orders for this visit:    1. Essential hypertension (Primary)  -     lisinopril-hydrochlorothiazide (PRINZIDE,ZESTORETIC) 20-12.5 MG per tablet; Take 1 tablet by mouth Daily.  Dispense: 180 tablet; Refill: 0  Well controlled, BP goal for age is <140/90 per JNC 8 guidelines, and continue current medications    2. Class 3 severe obesity due to excess calories with serious comorbidity and body mass index (BMI) of 45.0 to 49.9 in adult  Class 3 Severe Obesity (BMI >=40). Obesity-related health conditions include the following: obstructive sleep apnea, hypertension, coronary heart disease, diabetes mellitus, dyslipidemias, GERD, and osteoarthritis. Obesity is unchanged. BMI is is above average; BMI management plan is completed. We discussed low calorie, low carb based diet program, portion control, and increasing exercise.     3. Stage 3b chronic kidney disease  Stable on RAAS blockage, continues follow up with Nephrology.     4. Preventative health care  -     Hemoglobin A1c; Future  -     Lipid panel; Future    5. Acute pain of left knee  Continue ice and tylenol if needed. If pain persists may consider PT or additional imaging.     Other orders  -     COVID-19 (Pfizer) Bivalent 12+yrs       An After Visit Summary was printed and given to the patient at discharge.  Return in about 6 months (around 12/14/2023) for Medicare Wellness with Dr. Singleton .          ANY Robin 6/14/2023   Electronically signed.

## 2023-07-24 ENCOUNTER — HOSPITAL ENCOUNTER (OUTPATIENT)
Dept: GENERAL RADIOLOGY | Facility: HOSPITAL | Age: 73
Discharge: HOME | End: 2023-07-24
Admitting: INTERNAL MEDICINE
Payer: MEDICARE

## 2023-07-24 ENCOUNTER — OFFICE VISIT (OUTPATIENT)
Dept: INTERNAL MEDICINE | Facility: CLINIC | Age: 73
End: 2023-07-24
Payer: MEDICARE

## 2023-07-24 VITALS
RESPIRATION RATE: 16 BRPM | WEIGHT: 252.6 LBS | BODY MASS INDEX: 44.76 KG/M2 | DIASTOLIC BLOOD PRESSURE: 72 MMHG | HEIGHT: 63 IN | SYSTOLIC BLOOD PRESSURE: 142 MMHG | HEART RATE: 61 BPM | OXYGEN SATURATION: 98 %

## 2023-07-24 DIAGNOSIS — F33.1 MODERATE EPISODE OF RECURRENT MAJOR DEPRESSIVE DISORDER: Primary | ICD-10-CM

## 2023-07-24 DIAGNOSIS — R51.9 SCALP PAIN: ICD-10-CM

## 2023-07-24 PROCEDURE — 3077F SYST BP >= 140 MM HG: CPT | Performed by: INTERNAL MEDICINE

## 2023-07-24 PROCEDURE — 1160F RVW MEDS BY RX/DR IN RCRD: CPT | Performed by: INTERNAL MEDICINE

## 2023-07-24 PROCEDURE — 1159F MED LIST DOCD IN RCRD: CPT | Performed by: INTERNAL MEDICINE

## 2023-07-24 PROCEDURE — 70260 X-RAY EXAM OF SKULL: CPT

## 2023-07-24 PROCEDURE — 3078F DIAST BP <80 MM HG: CPT | Performed by: INTERNAL MEDICINE

## 2023-07-24 PROCEDURE — 99214 OFFICE O/P EST MOD 30 MIN: CPT | Performed by: INTERNAL MEDICINE

## 2023-07-24 RX ORDER — BUPROPION HYDROCHLORIDE 150 MG/1
150 TABLET ORAL DAILY
Qty: 30 TABLET | Refills: 1 | Status: SHIPPED | OUTPATIENT
Start: 2023-07-24

## 2023-07-24 RX ORDER — BUPROPION HYDROCHLORIDE 150 MG/1
150 TABLET ORAL DAILY
Qty: 30 TABLET | Refills: 1 | Status: SHIPPED | OUTPATIENT
Start: 2023-07-24 | End: 2023-07-24 | Stop reason: SDUPTHER

## 2023-07-24 NOTE — PROGRESS NOTES
"CC: Depression    History:  Sarah Littlejohn is a 73 y.o. female   She notes she has not been doing well over the last month, has noticed a good deal of discord between she and her .  She notes he is not wanting to do as much, but continues to expect a great deal out of her.  She continues on Lexapro, which had previously done well for her, but she has decreasing ability to handle what is going on at home.  She has not had any decrease in pleasure with activities, but this interpersonal situation is the most bothersome to her.  She believes if the situation was resolved, her mood would again be well controlled.      ROS:  Review of Systems   Respiratory:  Negative for shortness of breath.    Cardiovascular:  Negative for chest pain.   Psychiatric/Behavioral:  Positive for dysphoric mood. Negative for behavioral problems and suicidal ideas. The patient is nervous/anxious.       reports that she has never smoked. She has never been exposed to tobacco smoke. She has never used smokeless tobacco. She reports that she does not drink alcohol and does not use drugs.      Current Outpatient Medications:     allopurinol (ZYLOPRIM) 100 MG tablet, Take 1 tablet by mouth Daily., Disp: , Rfl:     buPROPion XL (Wellbutrin XL) 150 MG 24 hr tablet, Take 1 tablet by mouth Daily., Disp: 30 tablet, Rfl: 1    Calcium Carb-Cholecalciferol 600-10 MG-MCG tablet, Take 1 tablet by mouth Daily., Disp: 90 tablet, Rfl: 3    escitalopram (LEXAPRO) 10 MG tablet, Take 1 tablet by mouth Daily., Disp: 90 tablet, Rfl: 3    lisinopril-hydrochlorothiazide (PRINZIDE,ZESTORETIC) 20-12.5 MG per tablet, Take 1 tablet by mouth Daily., Disp: 180 tablet, Rfl: 0    Pseudoephedrine-Acetaminophen (SINUS PO), Take 1 tablet by mouth Daily., Disp: , Rfl:     OBJECTIVE:  /72 (BP Location: Left arm, Patient Position: Sitting, Cuff Size: Adult)   Pulse 61   Resp 16   Ht 160 cm (63\")   Wt 115 kg (252 lb 9.6 oz)   SpO2 98%   BMI 44.75 kg/m²  "   Physical Exam  Constitutional:       General: She is not in acute distress.  Pulmonary:      Effort: Pulmonary effort is normal. No respiratory distress.   Musculoskeletal:      Comments: She has mild tenderness about the frontal aspect of the scalp.  The midline of the frontal bone is appropriately ossified, but she has an area on bilateral aspects of the midline with a step off that extends from nearly the hairline toward the coronal suture.   Neurological:      Mental Status: She is alert and oriented to person, place, and time.   Psychiatric:         Mood and Affect: Mood is anxious and depressed.       Assessment/Plan     Diagnoses and all orders for this visit:    1. Moderate episode of recurrent major depressive disorder (Primary)  -     buPROPion XL (Wellbutrin XL) 150 MG 24 hr tablet; Take 1 tablet by mouth Daily.  Dispense: 30 tablet; Refill: 1  Continue Lexapro and add wellbutrin as adjunctive therapy for depression. Consider counseling if not improving given the highly situational component.     2. Scalp pain  -     XR skull complete 4+ vw; Future  Differential is wide, but we will order roentgenograms for evaluation of ossification of bone of the scalp.      An After Visit Summary was printed and given to the patient at discharge.  Return for Next scheduled follow up.      Michael Singleton D.O. 7/24/2023   Electronically signed.

## 2023-08-03 ENCOUNTER — HOSPITAL ENCOUNTER (OUTPATIENT)
Dept: MAMMOGRAPHY | Facility: HOSPITAL | Age: 73
Discharge: HOME | End: 2023-08-03
Payer: MEDICARE

## 2023-08-07 NOTE — PROGRESS NOTES
Progress Note      Pt Name: Brodie Dumont  YOB: 1950  MRN: [id number]    Date of evaluation: 9/29/2023  History Obtained From:  patient, electronic medical record    CHIEF COMPLAINT:    Chief Complaint   Patient presents with    Follow-up     MGUS (monoclonal gammopathy of unknown significance)       Current active problems  IgM MGUS    HISTORY OF PRESENT ILLNESS:    Brodie Dumont is a 68 y.o.  female with IgM MGUS followed in the office since 12/15/2016. She continues to be followed with observation only. She is doing very well at this time. She denies any new medical issues. Prior evaluation by Dr. Halima Conroy revealed that her GFR had improved but she does not remember the percentage. She follows up with him and her PCP Dr. Daxa Self in December. She denies any night sweats, weight loss, extreme fatigue or pruritus. She continues on escitalopram 10 mg daily for anxiety which is stable. She continues taking allopurinol 100 mg daily for hyperuricemia. She does take melatonin for her sleep at night. Blood pressure is stable on her lisinopril 20 daily. HEMATOLOGY HISTORY: IgM-MGUS 1/23/2017  Soledad Daley was seen in initial hematology consultation on 12/15/2016 referred by Dr. Halima Conroy for an abnormal SPEP. SEROLOGY 7/21/2016  SPEP - M spike of 0.4 g/dL   QI - IgM of 331 ()    SEROLOGY 11/7/2016  SPEP - M spike of 0.6 g/dL    Her initial CBC in the office reveals a WBC of 6.05, Hgb 13.1 with .4 and PLT of 196,000.      BASELINE SEROLOGY 12/15/2016  B12 - 701   Folate - 12   QI - IgM 285 () with normal IgA and IgG   Free serum light chains - kappa 77.62, lambda 33.92, K/L ratio 2.29   CMP - creatinine 1.2 with GFR 47.9 otherwise normal    BASELINE URINE STUDIES:  24-hour urine for immunoelectrophoresis and immunofixation returned on 12/19/2016 revealed no monoclonal protein    BONE SURVEY AT [facility] ON 12/15/2016 was negative for any lytic lesions    Bone marrow

## 2023-08-22 DIAGNOSIS — F33.1 MODERATE EPISODE OF RECURRENT MAJOR DEPRESSIVE DISORDER: ICD-10-CM

## 2023-08-22 RX ORDER — BUPROPION HYDROCHLORIDE 150 MG/1
150 TABLET ORAL DAILY
Qty: 30 TABLET | Refills: 2 | Status: SHIPPED | OUTPATIENT
Start: 2023-08-22

## 2023-08-22 NOTE — TELEPHONE ENCOUNTER
Rx Refill Note  Requested Prescriptions     Pending Prescriptions Disp Refills    buPROPion XL (WELLBUTRIN XL) 150 MG 24 hr tablet [Pharmacy Med Name: BUPROPION HCL ER (XL) 150 M 150 Tablet] 30 tablet 1     Sig: TAKE ONE TABLET BY MOUTH DAILY      Last office visit with prescribing clinician: 7/24/2023   Last telemedicine visit with prescribing clinician: Visit date not found   Next office visit with prescribing clinician: 12/19/2023                         Would you like a call back once the refill request has been completed: [] Yes [] No    If the office needs to give you a call back, can they leave a voicemail: [] Yes [] No    Luis A Cleary MA  08/22/23, 14:55 CDT

## 2023-09-28 ENCOUNTER — TELEPHONE (OUTPATIENT)
Dept: HEMATOLOGY | Age: 73
End: 2023-09-28

## 2023-09-29 ENCOUNTER — TELEPHONE (OUTPATIENT)
Dept: INTERNAL MEDICINE | Facility: CLINIC | Age: 73
End: 2023-09-29
Payer: MEDICARE

## 2023-09-29 ENCOUNTER — TELEPHONE (OUTPATIENT)
Dept: HEMATOLOGY | Age: 73
End: 2023-09-29

## 2023-09-29 ENCOUNTER — OFFICE VISIT (OUTPATIENT)
Dept: HEMATOLOGY | Age: 73
End: 2023-09-29
Payer: MEDICARE

## 2023-09-29 ENCOUNTER — HOSPITAL ENCOUNTER (OUTPATIENT)
Dept: INFUSION THERAPY | Age: 73
Discharge: HOME | End: 2023-09-29
Payer: MEDICARE

## 2023-09-29 VITALS
WEIGHT: 243.8 LBS | HEIGHT: 63 IN | SYSTOLIC BLOOD PRESSURE: 122 MMHG | HEART RATE: 66 BPM | DIASTOLIC BLOOD PRESSURE: 84 MMHG | TEMPERATURE: 98.1 F | BODY MASS INDEX: 43.2 KG/M2 | OXYGEN SATURATION: 96 %

## 2023-09-29 DIAGNOSIS — Z12.31 ENCOUNTER FOR SCREENING MAMMOGRAM FOR MALIGNANT NEOPLASM OF BREAST: ICD-10-CM

## 2023-09-29 DIAGNOSIS — D69.6 THROMBOCYTOPENIA (HCC): ICD-10-CM

## 2023-09-29 DIAGNOSIS — D47.2 MGUS (MONOCLONAL GAMMOPATHY OF UNKNOWN SIGNIFICANCE): ICD-10-CM

## 2023-09-29 DIAGNOSIS — D75.89 MACROCYTOSIS: ICD-10-CM

## 2023-09-29 DIAGNOSIS — D47.2 MGUS (MONOCLONAL GAMMOPATHY OF UNKNOWN SIGNIFICANCE): Primary | ICD-10-CM

## 2023-09-29 DIAGNOSIS — D72.10 EOSINOPHILIA, UNSPECIFIED TYPE: ICD-10-CM

## 2023-09-29 LAB
ALBUMIN SERPL-MCNC: 4.2 G/DL (ref 3.5–5.2)
ALP SERPL-CCNC: 113 U/L (ref 35–104)
ALT SERPL-CCNC: 190 U/L (ref 9–52)
ANION GAP SERPL CALCULATED.3IONS-SCNC: 6 MMOL/L (ref 7–19)
AST SERPL-CCNC: 142 U/L (ref 14–36)
BASOPHILS # BLD: 0.03 K/UL (ref 0.01–0.08)
BASOPHILS NFR BLD: 0.3 % (ref 0.1–1.2)
BILIRUB SERPL-MCNC: 0.7 MG/DL (ref 0.2–1.3)
BUN SERPL-MCNC: 32 MG/DL (ref 7–17)
CALCIUM SERPL-MCNC: 10 MG/DL (ref 8.4–10.2)
CHLORIDE SERPL-SCNC: 108 MMOL/L (ref 98–111)
CO2 SERPL-SCNC: 28 MMOL/L (ref 22–29)
CREAT SERPL-MCNC: 1.7 MG/DL (ref 0.5–1)
EOSINOPHIL # BLD: 0.46 K/UL (ref 0.04–0.54)
EOSINOPHIL NFR BLD: 5.1 % (ref 0.7–7)
ERYTHROCYTE [DISTWIDTH] IN BLOOD BY AUTOMATED COUNT: 13.2 % (ref 11.7–14.4)
GLOBULIN: 3.8 G/DL
GLUCOSE SERPL-MCNC: 100 MG/DL (ref 74–106)
HCT VFR BLD AUTO: 37.2 % (ref 34.1–44.9)
HGB BLD-MCNC: 12.5 G/DL (ref 11.2–15.7)
LYMPHOCYTES # BLD: 1.87 K/UL (ref 1.18–3.74)
LYMPHOCYTES NFR BLD: 20.6 % (ref 19.3–53.1)
MCH RBC QN AUTO: 31.7 PG (ref 25.6–32.2)
MCHC RBC AUTO-ENTMCNC: 33.6 G/DL (ref 32.3–35.5)
MCV RBC AUTO: 94.4 FL (ref 79.4–94.8)
MONOCYTES # BLD: 0.84 K/UL (ref 0.24–0.82)
MONOCYTES NFR BLD: 9.3 % (ref 4.7–12.5)
NEUTROPHILS # BLD: 5.83 K/UL (ref 1.56–6.13)
NEUTS SEG NFR BLD: 64.3 % (ref 34–71.1)
PLATELET # BLD AUTO: 219 K/UL (ref 182–369)
PMV BLD AUTO: 11.2 FL (ref 7.4–10.4)
POTASSIUM SERPL-SCNC: 4.6 MMOL/L (ref 3.5–5.1)
PROT SERPL-MCNC: 8 G/DL (ref 6.3–8.2)
RBC # BLD AUTO: 3.94 M/UL (ref 3.93–5.22)
SODIUM SERPL-SCNC: 142 MMOL/L (ref 137–145)
WBC # BLD AUTO: 9.07 K/UL (ref 3.98–10.04)

## 2023-09-29 PROCEDURE — 36415 COLL VENOUS BLD VENIPUNCTURE: CPT

## 2023-09-29 PROCEDURE — 99212 OFFICE O/P EST SF 10 MIN: CPT

## 2023-09-29 PROCEDURE — 99213 OFFICE O/P EST LOW 20 MIN: CPT | Performed by: PHYSICIAN ASSISTANT

## 2023-09-29 PROCEDURE — 85025 COMPLETE CBC W/AUTO DIFF WBC: CPT

## 2023-09-29 PROCEDURE — 3017F COLORECTAL CA SCREEN DOC REV: CPT | Performed by: PHYSICIAN ASSISTANT

## 2023-09-29 PROCEDURE — 1036F TOBACCO NON-USER: CPT | Performed by: PHYSICIAN ASSISTANT

## 2023-09-29 PROCEDURE — 1090F PRES/ABSN URINE INCON ASSESS: CPT | Performed by: PHYSICIAN ASSISTANT

## 2023-09-29 PROCEDURE — G8417 CALC BMI ABV UP PARAM F/U: HCPCS | Performed by: PHYSICIAN ASSISTANT

## 2023-09-29 PROCEDURE — 80053 COMPREHEN METABOLIC PANEL: CPT

## 2023-09-29 PROCEDURE — 1123F ACP DISCUSS/DSCN MKR DOCD: CPT | Performed by: PHYSICIAN ASSISTANT

## 2023-09-29 PROCEDURE — G8427 DOCREV CUR MEDS BY ELIG CLIN: HCPCS | Performed by: PHYSICIAN ASSISTANT

## 2023-09-29 PROCEDURE — G8400 PT W/DXA NO RESULTS DOC: HCPCS | Performed by: PHYSICIAN ASSISTANT

## 2023-09-29 ASSESSMENT — ENCOUNTER SYMPTOMS
PHOTOPHOBIA: 0
SHORTNESS OF BREATH: 0
SORE THROAT: 0
NAUSEA: 0
COUGH: 0
TROUBLE SWALLOWING: 0
COLOR CHANGE: 0
DIARRHEA: 0
EYE ITCHING: 0
CONSTIPATION: 0
VOMITING: 0
ABDOMINAL DISTENTION: 0
BLOOD IN STOOL: 0
WHEEZING: 0
EYE DISCHARGE: 0
BACK PAIN: 0
VOICE CHANGE: 0
ABDOMINAL PAIN: 0

## 2023-09-29 NOTE — TELEPHONE ENCOUNTER
----- Message from Mani Gao PA-C sent at 9/29/2023 11:38 AM CDT -----  Transaminases significantly abnormal-this appears to be new.  -Send to Dr. Dominique Grace to evaluate further

## 2023-09-29 NOTE — TELEPHONE ENCOUNTER
Any plans for followup from the ordering physician? We are happy to review, but will need to know what, if anything, they plan to do to avoid duplication.

## 2023-09-29 NOTE — TELEPHONE ENCOUNTER
"Radha with [facility] called to inform Dr. Singleton patient had elevated \"LFT's\" on the CMP labs ordered by their office today.  She stated a copy of the labs have been faxed to our office and they should be available to view through Epic.  If there are any questions, Radha may be reached at [phone].  "

## 2023-10-02 ENCOUNTER — TELEPHONE (OUTPATIENT)
Dept: HEMATOLOGY | Age: 73
End: 2023-10-02

## 2023-10-02 NOTE — TELEPHONE ENCOUNTER
I tried calling Radha and she was not available.  I left a message with Arley for Radha to return my call.

## 2023-10-02 NOTE — TELEPHONE ENCOUNTER
Spoke with Hodan Tom at Dr. Negra Whyte regarding elevated LFTs. She stated that she would left NETTIE Whyte know that we faxed to him to review and treat.

## 2023-10-02 NOTE — TELEPHONE ENCOUNTER
Radha returned my call and was informed of Dr. Singleton's message.  When asked about their plans to follow-up concerning lab results, she stated that is why it was deferred to .  She said their next scheduled follow-up appointment with patient is in March.    Dr. Singleton informed.     I tried calling Radha again to ask her what specifically has been done by their office to follow-up on lab results since the labs were ordered by their office, per Dr. Singleton.  I was transferred to Radha's voicemail and left her a message to return my call.   There are no Wet Read(s) to document.

## 2023-10-04 ENCOUNTER — TELEPHONE (OUTPATIENT)
Dept: HEMATOLOGY | Age: 73
End: 2023-10-04

## 2023-10-04 ENCOUNTER — TELEPHONE (OUTPATIENT)
Dept: INFUSION THERAPY | Age: 73
End: 2023-10-04

## 2023-10-04 DIAGNOSIS — R79.89 ELEVATED LFTS: Primary | ICD-10-CM

## 2023-10-04 DIAGNOSIS — D47.2 MGUS (MONOCLONAL GAMMOPATHY OF UNKNOWN SIGNIFICANCE): ICD-10-CM

## 2023-10-04 LAB
COLLECT DURATION TIME SPEC: 24 H
SPECIMEN VOL ?TM UR: 1000 ML

## 2023-10-04 NOTE — TELEPHONE ENCOUNTER
Spoke with [facility] regarding elevated LFTs. Explained labs and US. Instructed to hold statins she stated she is not taking.

## 2023-10-04 NOTE — TELEPHONE ENCOUNTER
Received a call from FIDEL ANNE with Dr. Chay Lantigua office regarding CMP. She stated that he wanted the ordering provider to address issues. I spoke with Betsey Rojas and elvi.

## 2023-10-04 NOTE — TELEPHONE ENCOUNTER
Left Message regarding US Liver scheduled for October 18 at 9:00 with 8:45 arrival, nothing to eat or drink after midnight. Her labs are scheduled prior to scan here in the office at 8:15.

## 2023-10-06 LAB
ALBUMIN SERPL-MCNC: 3.75 G/DL (ref 3.75–5.01)
ALPHA1 GLOB SERPL ELPH-MCNC: 0.4 G/DL (ref 0.19–0.46)
ALPHA2 GLOB SERPL ELPH-MCNC: 0.9 G/DL (ref 0.48–1.05)
B-GLOBULIN SERPL ELPH-MCNC: 0.97 G/DL (ref 0.48–1.1)
DEPRECATED KAPPA LC FREE/LAMBDA SER: 3.83 {RATIO} (ref 0.26–1.65)
EER MONOCLONAL PROTEIN AND FLC, SERUM: ABNORMAL
GAMMA GLOB SERPL ELPH-MCNC: 1.49 G/DL (ref 0.62–1.51)
IGA SERPL-MCNC: 304 MG/DL (ref 68–408)
IGG SERPL-MCNC: 1321 MG/DL (ref 768–1632)
IGM SERPL-MCNC: 512 MG/DL (ref 35–263)
INTERPRETATION SERPL IFE-IMP: ABNORMAL
INTERPRETATION SERPL IFE-IMP: ABNORMAL
KAPPA LC FREE SER-MCNC: 254.5 MG/L (ref 3.3–19.4)
LAMBDA LC FREE SERPL-MCNC: 66.37 MG/L (ref 5.71–26.3)
MONOCLONAL PROTEIN, SERUM: 0.36 G/DL
PROT SERPL-MCNC: 7.5 G/DL (ref 6.3–8.2)

## 2023-10-09 LAB
ALBUMIN ?TM MFR UR ELPH: 80.1 %
ALPHA1 GLOB ?TM MFR UR ELPH: 14.7 %
ALPHA2 GLOB ?TM MFR UR ELPH: 5.2 %
B-GLOBULIN ?TM MFR UR ELPH: 0 %
COLLECT DURATION TIME SPEC: 24 H
GAMMA GLOB ?TM MFR UR ELPH: 0 %
INTERPRETATION UR IFE-IMP: NORMAL
M PROTEIN 24H MFR UR ELPH: 0 %
M PROTEIN 24H UR ELPH-MRATE: 0 MG/24 HRS
PATHOLOGY STUDY: NORMAL
PROT 24H UR-MRATE: 90 MG/D (ref 40–150)
PROT UR-MCNC: 9 MG/DL
SPECIMEN VOL ?TM UR: 1000 ML

## 2023-10-18 ENCOUNTER — HOSPITAL ENCOUNTER (OUTPATIENT)
Dept: ULTRASOUND IMAGING | Age: 73
Discharge: HOME | End: 2023-10-18
Payer: MEDICARE

## 2023-10-18 ENCOUNTER — TELEPHONE (OUTPATIENT)
Dept: HEMATOLOGY | Age: 73
End: 2023-10-18

## 2023-10-18 ENCOUNTER — HOSPITAL ENCOUNTER (OUTPATIENT)
Dept: INFUSION THERAPY | Age: 73
Discharge: HOME | End: 2023-10-18
Payer: MEDICARE

## 2023-10-18 DIAGNOSIS — R79.89 ELEVATED LFTS: ICD-10-CM

## 2023-10-18 LAB
ALBUMIN SERPL-MCNC: 4.2 G/DL (ref 3.5–5.2)
ALP SERPL-CCNC: 123 U/L (ref 35–104)
ALT SERPL-CCNC: 40 U/L (ref 9–52)
ANION GAP SERPL CALCULATED.3IONS-SCNC: 10 MMOL/L (ref 7–19)
AST SERPL-CCNC: 41 U/L (ref 14–36)
BILIRUB SERPL-MCNC: 0.5 MG/DL (ref 0.2–1.3)
BUN SERPL-MCNC: 35 MG/DL (ref 7–17)
CALCIUM SERPL-MCNC: 10.2 MG/DL (ref 8.4–10.2)
CHLORIDE SERPL-SCNC: 106 MMOL/L (ref 98–111)
CO2 SERPL-SCNC: 27 MMOL/L (ref 22–29)
CREAT SERPL-MCNC: 1.8 MG/DL (ref 0.5–1)
GLOBULIN: 3.9 G/DL
GLUCOSE SERPL-MCNC: 118 MG/DL (ref 74–106)
POTASSIUM SERPL-SCNC: 4.8 MMOL/L (ref 3.5–5.1)
PROT SERPL-MCNC: 8.2 G/DL (ref 6.3–8.2)
SODIUM SERPL-SCNC: 143 MMOL/L (ref 137–145)

## 2023-10-18 PROCEDURE — 80053 COMPREHEN METABOLIC PANEL: CPT

## 2023-10-18 PROCEDURE — 76705 ECHO EXAM OF ABDOMEN: CPT

## 2023-10-18 PROCEDURE — 36415 COLL VENOUS BLD VENIPUNCTURE: CPT

## 2023-10-18 NOTE — TELEPHONE ENCOUNTER
----- Message from Jamila Macedo PA-C sent at 10/18/2023 10:26 AM CDT -----  Force more fluids, liver functions much better

## 2023-10-18 NOTE — TELEPHONE ENCOUNTER
----- Message from Jaylon Bailey PA-C sent at 10/18/2023 10:26 AM CDT -----  Force more fluids, liver functions much better

## 2023-11-13 DIAGNOSIS — F33.1 MODERATE EPISODE OF RECURRENT MAJOR DEPRESSIVE DISORDER: ICD-10-CM

## 2023-11-13 RX ORDER — BUPROPION HYDROCHLORIDE 150 MG/1
150 TABLET ORAL DAILY
Qty: 90 TABLET | Refills: 1 | Status: SHIPPED | OUTPATIENT
Start: 2023-11-13

## 2023-11-13 NOTE — TELEPHONE ENCOUNTER
Rx Refill Note  Requested Prescriptions     Pending Prescriptions Disp Refills    buPROPion XL (WELLBUTRIN XL) 150 MG 24 hr tablet [Pharmacy Med Name: BUPROPION HCL  MG TABLET] 30 tablet 1     Sig: TAKE 1 TABLET BY MOUTH EVERY DAY      Last office visit with prescribing clinician: 7/24/2023   Last telemedicine visit with prescribing clinician: Visit date not found   Next office visit with prescribing clinician: 12/19/2023                         Would you like a call back once the refill request has been completed: [] Yes [] No    If the office needs to give you a call back, can they leave a voicemail: [] Yes [] No    Luis A Cleary MA  11/13/23, 10:00 CST

## 2023-12-01 ENCOUNTER — CLINICAL SUPPORT (OUTPATIENT)
Dept: INTERNAL MEDICINE | Facility: CLINIC | Age: 73
End: 2023-12-01
Payer: MEDICARE

## 2023-12-01 ENCOUNTER — TELEPHONE (OUTPATIENT)
Dept: INTERNAL MEDICINE | Facility: CLINIC | Age: 73
End: 2023-12-01
Payer: MEDICARE

## 2023-12-01 DIAGNOSIS — Z23 NEED FOR INFLUENZA VACCINATION: Primary | ICD-10-CM

## 2023-12-01 DIAGNOSIS — F33.1 MODERATE EPISODE OF RECURRENT MAJOR DEPRESSIVE DISORDER: Primary | ICD-10-CM

## 2023-12-01 PROCEDURE — 90662 IIV NO PRSV INCREASED AG IM: CPT | Performed by: NURSE PRACTITIONER

## 2023-12-01 PROCEDURE — G0008 ADMIN INFLUENZA VIRUS VAC: HCPCS | Performed by: NURSE PRACTITIONER

## 2023-12-01 NOTE — TELEPHONE ENCOUNTER
Patient stated she does not feel the Lexapro is working well enough alone.  She asked if another medication can be prescribed for her to take in addition to Lexapro for her nerves.  Patient has stopped Wellbutrin because it caused constipation.

## 2023-12-01 NOTE — TELEPHONE ENCOUNTER
Sent Rx for Vraylar that we will add to Lexapro. This is a once daily therapy to help with depression and mood.

## 2023-12-19 ENCOUNTER — OFFICE VISIT (OUTPATIENT)
Dept: INTERNAL MEDICINE | Facility: CLINIC | Age: 73
End: 2023-12-19
Payer: MEDICARE

## 2023-12-19 VITALS
DIASTOLIC BLOOD PRESSURE: 71 MMHG | BODY MASS INDEX: 44.47 KG/M2 | OXYGEN SATURATION: 95 % | WEIGHT: 251 LBS | HEART RATE: 62 BPM | SYSTOLIC BLOOD PRESSURE: 137 MMHG | HEIGHT: 63 IN

## 2023-12-19 DIAGNOSIS — J01.10 ACUTE NON-RECURRENT FRONTAL SINUSITIS: ICD-10-CM

## 2023-12-19 DIAGNOSIS — Z00.00 MEDICARE ANNUAL WELLNESS VISIT, SUBSEQUENT: Primary | ICD-10-CM

## 2023-12-19 DIAGNOSIS — F33.41 RECURRENT MAJOR DEPRESSIVE DISORDER, IN PARTIAL REMISSION: ICD-10-CM

## 2023-12-19 RX ORDER — ESCITALOPRAM OXALATE 10 MG/1
20 TABLET ORAL DAILY
Qty: 90 TABLET | Refills: 3 | Status: SHIPPED | OUTPATIENT
Start: 2023-12-19

## 2023-12-19 RX ORDER — FLUTICASONE PROPIONATE 50 MCG
2 SPRAY, SUSPENSION (ML) NASAL DAILY
Qty: 15.8 ML | Refills: 0 | Status: SHIPPED | OUTPATIENT
Start: 2023-12-19

## 2023-12-19 RX ORDER — DOXYCYCLINE HYCLATE 100 MG/1
100 CAPSULE ORAL 2 TIMES DAILY
Qty: 14 CAPSULE | Refills: 0 | Status: SHIPPED | OUTPATIENT
Start: 2023-12-19 | End: 2023-12-26

## 2023-12-19 RX ORDER — METHYLPREDNISOLONE 4 MG/1
TABLET ORAL
Qty: 21 TABLET | Refills: 0 | Status: SHIPPED | OUTPATIENT
Start: 2023-12-19

## 2023-12-19 NOTE — PATIENT INSTRUCTIONS
Medicare Wellness  Personal Prevention Plan of Service     Date of Office Visit:    Encounter Provider:  ANY Tristan  Place of Service:  [facility] INTER[facility]  Patient Name: Sarah Littlejohn  :  1950    As part of the Medicare Wellness portion of your visit today, we are providing you with this personalized preventive plan of services (PPPS). This plan is based upon recommendations of the [place] Preventive Services Task Force (USPSTF) and the Advisory Committee on Immunization Practices (ACIP).    This lists the preventive care services that should be considered, and provides dates of when you are due. Items listed as completed are up-to-date and do not require any further intervention.    Health Maintenance   Topic Date Due    ANNUAL WELLNESS VISIT  2023    ZOSTER VACCINE (1 of 2) 2023 (Originally 2/10/2001)    COVID-19 Vaccine (5 - -24 season) 2023 (Originally 2023)    TDAP/TD VACCINES (1 - Tdap) 2024 (Originally 2/10/1970)    BMI FOLLOWUP  2024    MAMMOGRAM  2024    DXA SCAN  2024    COLORECTAL CANCER SCREENING  2026    HEPATITIS C SCREENING  Completed    INFLUENZA VACCINE  Completed    Pneumococcal Vaccine 65+  Completed    PAP SMEAR  Discontinued    HEMOGLOBIN A1C  Discontinued       No orders of the defined types were placed in this encounter.      No follow-ups on file.

## 2023-12-19 NOTE — ASSESSMENT & PLAN NOTE
Increase lexapro   Re-eval in 4 weeks or sooner prn   Should SI/HI occur seek immediate emergency care

## 2023-12-19 NOTE — PROGRESS NOTES
The ABCs of the Annual Wellness Visit  Subsequent Medicare Wellness Visit    Subjective    Sarah Littlejohn is a 73 y.o. female who presents for a Subsequent Medicare Wellness Visit.    The following portions of the patient's history were reviewed and   updated as appropriate: allergies, current medications, past family history, past medical history, past social history, past surgical history, and problem list.    Compared to one year ago, the patient feels her physical   health is the same.    Compared to one year ago, the patient feels her mental   health is worse.    Recent Hospitalizations:  She was not admitted to the hospital during the last year.       Current Medical Providers:  Patient Care Team:  Michael Singleton DO as PCP - General (Internal Medicine)  Dewayne Santiago MD as Consulting Physician (Nephrology)  Jose Zeng MD as Consulting Physician (Oncology)  Tone, Marek Bravo MD as Consulting Physician (Dermatology)  Hannah Marvin MD as Consulting Physician (Ophthalmology)  Nikki Martin RN as Ambulatory  ([facility])    Outpatient Medications Prior to Visit   Medication Sig Dispense Refill    allopurinol (ZYLOPRIM) 100 MG tablet Take 1 tablet by mouth Daily.      Calcium Carb-Cholecalciferol 600-10 MG-MCG tablet Take 1 tablet by mouth Daily. 90 tablet 3    lisinopril-hydrochlorothiazide (PRINZIDE,ZESTORETIC) 20-12.5 MG per tablet Take 1 tablet by mouth Daily. 180 tablet 0    Pseudoephedrine-Acetaminophen (SINUS PO) Take 1 tablet by mouth Daily.      escitalopram (LEXAPRO) 10 MG tablet Take 1 tablet by mouth Daily. 90 tablet 3    Cariprazine HCl (Vraylar) 1.5 MG capsule capsule Take 1 capsule by mouth Daily. 30 capsule 1     No facility-administered medications prior to visit.       No opioid medication identified on active medication list. I have reviewed chart for other potential  high risk medication/s and harmful drug interactions in the  "elderly.        Aspirin is not on active medication list.  Aspirin use is not indicated based on review of current medical condition/s. Risk of harm outweighs potential benefits.  .    Patient Active Problem List   Diagnosis    Essential hypertension    Class 3 severe obesity due to excess calories with serious comorbidity and body mass index (BMI) of 40.0 to 44.9 in adult    Stage 3b chronic kidney disease    Recurrent major depressive disorder, in full remission    MGUS (monoclonal gammopathy of unknown significance)    Impaired glucose tolerance    Urge incontinence    Asymptomatic hyperuricemia    SOB (shortness of breath) on exertion    Pleural effusion on right     Advance Care Planning   Advance Care Planning     Advance Directive is not on file.  ACP discussion was held with the patient during this visit. Patient has an advance directive (not in EMR), copy requested.     Objective    Vitals:    23 1301   BP: 137/71   BP Location: Right arm   Patient Position: Sitting   Cuff Size: Adult   Pulse: 62   SpO2: 95%   Weight: 114 kg (251 lb)   Height: 160 cm (63\")     Estimated body mass index is 44.46 kg/m² as calculated from the following:    Height as of this encounter: 160 cm (63\").    Weight as of this encounter: 114 kg (251 lb).           Does the patient have evidence of cognitive impairment? No          HEALTH RISK ASSESSMENT    Smoking Status:  Social History     Tobacco Use   Smoking Status Never    Passive exposure: Never   Smokeless Tobacco Never     Alcohol Consumption:  Social History     Substance and Sexual Activity   Alcohol Use No     Fall Risk Screen:    STEADI Fall Risk Assessment was completed, and patient is at LOW risk for falls.Assessment completed on:2023    Depression Screenin/19/2023     1:04 PM   PHQ-2/PHQ-9 Depression Screening   Little Interest or Pleasure in Doing Things 0-->not at all   Feeling Down, Depressed or Hopeless 1-->several days   PHQ-9: Brief " Depression Severity Measure Score 1       Health Habits and Functional and Cognitive Screenin/19/2023     1:05 PM   Functional & Cognitive Status   Do you have difficulty preparing food and eating? No   Do you have difficulty bathing yourself, getting dressed or grooming yourself? No   Do you have difficulty using the toilet? No   Do you have difficulty moving around from place to place? No   Do you have trouble with steps or getting out of a bed or a chair? No   Current Diet Diabetic Diet   Dental Exam Not up to date   Eye Exam Up to date   Exercise (times per week) 7 times per week   Current Exercises Include Walking;Yard Work;House Cleaning   Do you need help using the phone?  No   Are you deaf or do you have serious difficulty hearing?  No   Do you need help to go to places out of walking distance? No   Do you need help shopping? No   Do you need help preparing meals?  No   Do you need help with housework?  No   Do you need help with laundry? No   Do you need help taking your medications? No   Do you need help managing money? No   Do you ever drive or ride in a car without wearing a seat belt? No   Have you felt unusual stress, anger or loneliness in the last month? Yes   Who do you live with? Spouse   If you need help, do you have trouble finding someone available to you? No   Do you have difficulty concentrating, remembering or making decisions? No       Age-appropriate Screening Schedule:  Refer to the list below for future screening recommendations based on patient's age, sex and/or medical conditions. Orders for these recommended tests are listed in the plan section. The patient has been provided with a written plan.    Health Maintenance   Topic Date Due    ANNUAL WELLNESS VISIT  2023    ZOSTER VACCINE (1 of 2) 2023 (Originally 2/10/2001)    COVID-19 Vaccine (5 - -24 season) 2023 (Originally 2023)    TDAP/TD VACCINES (1 - Tdap) 2024 (Originally 2/10/1970)    BMI  FOLLOWUP  06/14/2024    MAMMOGRAM  07/22/2024    DXA SCAN  07/22/2024    COLORECTAL CANCER SCREENING  06/08/2026    HEPATITIS C SCREENING  Completed    INFLUENZA VACCINE  Completed    Pneumococcal Vaccine 65+  Completed    PAP SMEAR  Discontinued    HEMOGLOBIN A1C  Discontinued     Discussed RSV vaccine. Is going to have vaccine after Cee.              CMS Preventative Services Quick Reference  Risk Factors Identified During Encounter  Depression/Dysphoria: Current medication adjusted.  Follow up visit planned.  Dental Screening Recommended  Vision Screening Recommended  The above risks/problems have been discussed with the patient.  Pertinent information has been shared with the patient in the After Visit Summary.  An After Visit Summary and PPPS were made available to the patient.    Follow Up:   Next Medicare Wellness visit to be scheduled in 1 year.       Additional E&M Note during same encounter follows:  Patient has multiple medical problems which are significant and separately identifiable that require additional work above and beyond the Medicare Wellness Visit.      Chief Complaint  Worsening depression    Subjective        HPI  Sarah Littlejohn is also being seen today for worsening depression and sinus pressure and drainage.     Vraylar was sent in for adjunct with lexapro but insurance did not approve. Sarah reports initial improvement with lexapro but now feels it is not working. Describes depression symptoms as feeling down and not enjoying things she used to. Denies problems with sleep. Denies problems with panic/anxiety. Denies SI/HI.     Reports sinus symptoms for at least 7 days which consist of drainage and sore throat. Denies constitutional symptoms. Has been taking claritin with no relief of symptoms.     Is up to date on labs and screenings for now.      Objective   Vital Signs:  /71 (BP Location: Right arm, Patient Position: Sitting, Cuff Size: Adult)   Pulse 62   Ht  "160 cm (63\")   Wt 114 kg (251 lb)   SpO2 95%   BMI 44.46 kg/m²     Physical Exam  Vitals reviewed.   Constitutional:       General: She is not in acute distress.     Appearance: Normal appearance. She is obese. She is not ill-appearing.   HENT:      Head: Normocephalic and atraumatic.      Right Ear: Tympanic membrane is bulging. Tympanic membrane is not erythematous.      Left Ear: Tympanic membrane is bulging. Tympanic membrane is not erythematous.      Nose: Congestion present. No rhinorrhea.      Mouth/Throat:      Mouth: Mucous membranes are moist.      Pharynx: Posterior oropharyngeal erythema present. No pharyngeal swelling, oropharyngeal exudate or uvula swelling.   Cardiovascular:      Rate and Rhythm: Normal rate and regular rhythm.      Heart sounds: Normal heart sounds. No murmur heard.     No friction rub. No gallop.   Pulmonary:      Effort: Pulmonary effort is normal. No respiratory distress.      Breath sounds: Normal breath sounds. No wheezing.   Musculoskeletal:      Cervical back: Normal range of motion and neck supple.   Skin:     General: Skin is warm and dry.      Capillary Refill: Capillary refill takes less than 2 seconds.   Neurological:      Mental Status: She is alert.   Psychiatric:         Mood and Affect: Mood normal.         Behavior: Behavior normal.         Thought Content: Thought content normal.         Judgment: Judgment normal.          The following data was reviewed by: ANY Tristan on 12/19/2023:  CMP          2/21/2023    13:27   CMP   Glucose 132    BUN 24    Creatinine 1.40    EGFR 40.1    Sodium 142    Potassium 4.0    Chloride 104    Calcium 9.0    BUN/Creatinine Ratio 17.1    Anion Gap 12.0      CBC          2/10/2023    10:21 9/29/2023    10:05   CBC   WBC 7.15     9.07       RBC 4.29     3.94       Hemoglobin 13.6     12.5       Hematocrit 43.1     37.2       .5     94.4       MCH 31.7     31.7       MCHC 31.6     33.6       RDW 12.5     13.2     "   Platelets 181     219          Details          This result is from an external source.             Lipid Panel          6/14/2023    11:46   Lipid Panel   Total Cholesterol 154    Triglycerides 115    HDL Cholesterol 46    VLDL Cholesterol 21    LDL Cholesterol  87    LDL/HDL Ratio 1.85      HgB          2/10/2023    10:21 9/29/2023    10:05   HGB   Hemoglobin 13.6     12.5          Details          This result is from an external source.                        Assessment and Plan   Diagnoses and all orders for this visit:    1. Medicare annual wellness visit, subsequent (Primary)    2. Recurrent major depressive disorder, in partial remission  -     escitalopram (LEXAPRO) 10 MG tablet; Take 2 tablets by mouth Daily.  Dispense: 90 tablet; Refill: 3    3. Acute non-recurrent frontal sinusitis  Comments:  Treat with antibiotics due to 7 or greater   Continue OTC claritin  Flonase and steroids also    Other orders  -     methylPREDNISolone (MEDROL) 4 MG dose pack; Take as directed on package instructions.  Dispense: 21 tablet; Refill: 0  -     doxycycline (VIBRAMYCIN) 100 MG capsule; Take 1 capsule by mouth 2 (Two) Times a Day for 7 days.  Dispense: 14 capsule; Refill: 0  -     fluticasone (FLONASE) 50 MCG/ACT nasal spray; 2 sprays into the nostril(s) as directed by provider Daily.  Dispense: 15.8 mL; Refill: 0           I spent 30 minutes caring for Sarah on this date of service. This time includes time spent by me in the following activities:preparing for the visit, reviewing tests, performing a medically appropriate examination and/or evaluation , counseling and educating the patient/family/caregiver, ordering medications, tests, or procedures, and documenting information in the medical record    Follow Up   Return in about 4 weeks (around 1/16/2024), or depression, for Recheck. Of depression.     Patient was given instructions and counseling regarding her condition or for health maintenance advice. Please see  specific information pulled into the AVS if appropriate.     Sarah verbalizes understanding and agreement with the plan of care.

## 2024-01-26 ENCOUNTER — OFFICE VISIT (OUTPATIENT)
Dept: INTERNAL MEDICINE | Facility: CLINIC | Age: 74
End: 2024-01-26
Payer: MEDICARE

## 2024-01-26 VITALS
HEART RATE: 65 BPM | HEIGHT: 63 IN | DIASTOLIC BLOOD PRESSURE: 74 MMHG | BODY MASS INDEX: 44.72 KG/M2 | OXYGEN SATURATION: 95 % | WEIGHT: 252.4 LBS | RESPIRATION RATE: 16 BRPM | SYSTOLIC BLOOD PRESSURE: 131 MMHG

## 2024-01-26 DIAGNOSIS — I10 PRIMARY HYPERTENSION: Primary | ICD-10-CM

## 2024-01-26 DIAGNOSIS — F33.41 RECURRENT MAJOR DEPRESSIVE DISORDER, IN PARTIAL REMISSION: ICD-10-CM

## 2024-01-26 DIAGNOSIS — Z78.0 ENCOUNTER FOR OSTEOPOROSIS SCREENING IN ASYMPTOMATIC POSTMENOPAUSAL PATIENT: ICD-10-CM

## 2024-01-26 DIAGNOSIS — Z12.31 ENCOUNTER FOR SCREENING MAMMOGRAM FOR MALIGNANT NEOPLASM OF BREAST: ICD-10-CM

## 2024-01-26 DIAGNOSIS — R74.8 ELEVATED LIVER ENZYMES: ICD-10-CM

## 2024-01-26 DIAGNOSIS — F33.42 RECURRENT MAJOR DEPRESSIVE DISORDER, IN FULL REMISSION: ICD-10-CM

## 2024-01-26 DIAGNOSIS — N18.32 STAGE 3B CHRONIC KIDNEY DISEASE: ICD-10-CM

## 2024-01-26 DIAGNOSIS — E66.01 CLASS 3 SEVERE OBESITY DUE TO EXCESS CALORIES WITH SERIOUS COMORBIDITY AND BODY MASS INDEX (BMI) OF 40.0 TO 44.9 IN ADULT: ICD-10-CM

## 2024-01-26 DIAGNOSIS — Z13.820 ENCOUNTER FOR OSTEOPOROSIS SCREENING IN ASYMPTOMATIC POSTMENOPAUSAL PATIENT: ICD-10-CM

## 2024-01-26 RX ORDER — ESCITALOPRAM OXALATE 20 MG/1
20 TABLET ORAL DAILY
Qty: 90 TABLET | Refills: 3 | Status: SHIPPED | OUTPATIENT
Start: 2024-01-26

## 2024-01-26 RX ORDER — LISINOPRIL AND HYDROCHLOROTHIAZIDE 20; 12.5 MG/1; MG/1
1 TABLET ORAL DAILY
Qty: 90 TABLET | Refills: 3 | Status: SHIPPED | OUTPATIENT
Start: 2024-01-26

## 2024-01-26 NOTE — PROGRESS NOTES
"CC: f/u hypertension    History:  Sarah Littlejohn is a 73 y.o. female   She notes she has been doing well without any acute illness.  She continues her current medications without side effects.      ROS:  Review of Systems   Constitutional:  Negative for chills and fever.   Respiratory:  Negative for cough and shortness of breath.    Cardiovascular:  Negative for chest pain and palpitations.   Gastrointestinal:  Negative for abdominal pain and constipation.        reports that she has never smoked. She has never been exposed to tobacco smoke. She has never used smokeless tobacco. She reports that she does not drink alcohol and does not use drugs.      Current Outpatient Medications:     allopurinol (ZYLOPRIM) 100 MG tablet, Take 1 tablet by mouth Daily., Disp: , Rfl:     Calcium Carb-Cholecalciferol 600-10 MG-MCG tablet, Take 1 tablet by mouth Daily., Disp: 90 tablet, Rfl: 3    escitalopram (LEXAPRO) 20 MG tablet, Take 1 tablet by mouth Daily., Disp: 90 tablet, Rfl: 3    lisinopril-hydrochlorothiazide (PRINZIDE,ZESTORETIC) 20-12.5 MG per tablet, Take 1 tablet by mouth Daily., Disp: 90 tablet, Rfl: 3    Magnesium 200 MG chewable tablet, Chew 2 each Daily. 250, Disp: , Rfl:     Pseudoephedrine-Acetaminophen (SINUS PO), Take 1 tablet by mouth Daily., Disp: , Rfl:     OBJECTIVE:  /74 (BP Location: Left arm, Patient Position: Sitting, Cuff Size: Adult)   Pulse 65   Resp 16   Ht 160 cm (63\")   Wt 114 kg (252 lb 6.4 oz)   SpO2 95%   BMI 44.71 kg/m²    Physical Exam  Constitutional:       General: She is not in acute distress.  Cardiovascular:      Rate and Rhythm: Normal rate and regular rhythm.      Heart sounds: Normal heart sounds. No murmur heard.  Pulmonary:      Effort: Pulmonary effort is normal.      Breath sounds: Normal breath sounds. No wheezing.   Neurological:      Mental Status: She is alert and oriented to person, place, and time.      Gait: Gait normal.   Psychiatric:         Mood and " Affect: Mood normal.         Behavior: Behavior normal.         Assessment/Plan     Diagnoses and all orders for this visit:    1. Primary hypertension (Primary)  -     lisinopril-hydrochlorothiazide (PRINZIDE,ZESTORETIC) 20-12.5 MG per tablet; Take 1 tablet by mouth Daily.  Dispense: 90 tablet; Refill: 3  Well controlled, BP goal for age is <140/90 per JNC 8 guidelines, and continue current medications    2. Stage 3b chronic kidney disease  Stable on RAAS blockade and following with [facility]. Consider SGLT2-I.     3. Class 3 severe obesity due to excess calories with serious comorbidity and body mass index (BMI) of 40.0 to 44.9 in adult     4. Elevated liver enzymes  Resolved on most recent labs in 10/2023. Will monitor serially. These were Sept and Oct labs reviewed in [facility].     5. Recurrent major depressive disorder, in full remission  -     escitalopram (LEXAPRO) 20 MG tablet; Take 1 tablet by mouth Daily.  Dispense: 90 tablet; Refill: 3  Stable on SSRI.     7. Encounter for screening mammogram for malignant neoplasm of breast  -     Mammo Screening Digital Tomosynthesis Bilateral With CAD; Future    8. Encounter for osteoporosis screening in asymptomatic postmenopausal patient  -     DEXA Bone Density Axial; Future        An After Visit Summary was printed and given to the patient at discharge.  Return in about 6 months (around 7/26/2024) for Recheck.      Michael Singleton D.O. 1/26/2024   Electronically signed.

## 2024-02-12 RX ORDER — FLUTICASONE PROPIONATE 50 MCG
2 SPRAY, SUSPENSION (ML) NASAL DAILY
Qty: 15.8 ML | Refills: 0 | Status: SHIPPED | OUTPATIENT
Start: 2024-02-12

## 2024-03-02 DIAGNOSIS — M85.852 OSTEOPENIA OF LEFT FEMORAL NECK: ICD-10-CM

## 2024-03-04 RX ORDER — CALCIUM CARBONATE/VITAMIN D3 600 MG-10
1 TABLET ORAL DAILY
Qty: 90 TABLET | Refills: 3 | Status: SHIPPED | OUTPATIENT
Start: 2024-03-04

## 2024-03-04 NOTE — TELEPHONE ENCOUNTER
Rx Refill Note  Requested Prescriptions     Pending Prescriptions Disp Refills    calcium carb-cholecalciferol 600-10 MG-MCG tablet per tablet [Pharmacy Med Name: CALCIUM 600 MG-VIT D3 10MCG TB] 90 tablet 3     Sig: TAKE 1 TABLET BY MOUTH EVERY DAY      Last office visit with prescribing clinician: 1/26/2024   Last telemedicine visit with prescribing clinician: Visit date not found   Next office visit with prescribing clinician: Visit date not found                         Would you like a call back once the refill request has been completed: [] Yes [] No    If the office needs to give you a call back, can they leave a voicemail: [] Yes [] No    Gina Aguilar, RMA  03/04/24, 11:47 CST    Medication last filled 12/13/22, qty 90, 3 refills.

## 2024-03-12 ENCOUNTER — TRANSCRIBE ORDERS (OUTPATIENT)
Dept: ADMINISTRATIVE | Facility: HOSPITAL | Age: 74
End: 2024-03-12
Payer: MEDICARE

## 2024-03-12 ENCOUNTER — HOSPITAL ENCOUNTER (OUTPATIENT)
Dept: GENERAL RADIOLOGY | Facility: HOSPITAL | Age: 74
Discharge: HOME | End: 2024-03-12
Admitting: NURSE PRACTITIONER
Payer: MEDICARE

## 2024-03-12 DIAGNOSIS — R05.9 COUGH, UNSPECIFIED TYPE: Primary | ICD-10-CM

## 2024-03-12 DIAGNOSIS — R05.9 COUGH, UNSPECIFIED TYPE: ICD-10-CM

## 2024-03-12 PROCEDURE — 71046 X-RAY EXAM CHEST 2 VIEWS: CPT

## 2024-03-17 ENCOUNTER — APPOINTMENT (OUTPATIENT)
Dept: GENERAL RADIOLOGY | Facility: HOSPITAL | Age: 74
DRG: 153 | End: 2024-03-17
Payer: MEDICARE

## 2024-03-17 ENCOUNTER — HOSPITAL ENCOUNTER (INPATIENT)
Facility: HOSPITAL | Age: 74
LOS: 1 days | Discharge: HOME | DRG: 153 | End: 2024-03-18
Attending: EMERGENCY MEDICINE | Admitting: HOSPITALIST
Payer: MEDICARE

## 2024-03-17 ENCOUNTER — APPOINTMENT (OUTPATIENT)
Dept: CT IMAGING | Facility: HOSPITAL | Age: 74
DRG: 153 | End: 2024-03-17
Payer: MEDICARE

## 2024-03-17 DIAGNOSIS — N18.9 CHRONIC KIDNEY DISEASE, UNSPECIFIED CKD STAGE: Primary | ICD-10-CM

## 2024-03-17 DIAGNOSIS — J18.9 PNEUMONIA DUE TO INFECTIOUS ORGANISM, UNSPECIFIED LATERALITY, UNSPECIFIED PART OF LUNG: ICD-10-CM

## 2024-03-17 LAB
ANION GAP SERPL CALCULATED.3IONS-SCNC: 13 MMOL/L (ref 5–15)
B PARAPERT DNA SPEC QL NAA+PROBE: NOT DETECTED
B PERT DNA SPEC QL NAA+PROBE: NOT DETECTED
BASOPHILS # BLD AUTO: 0.03 10*3/MM3 (ref 0–0.2)
BASOPHILS NFR BLD AUTO: 0.3 % (ref 0–1.5)
BUN SERPL-MCNC: 54 MG/DL (ref 8–23)
BUN/CREAT SERPL: 39.4 (ref 7–25)
C PNEUM DNA NPH QL NAA+NON-PROBE: NOT DETECTED
CALCIUM SPEC-SCNC: 9.9 MG/DL (ref 8.6–10.5)
CHLORIDE SERPL-SCNC: 104 MMOL/L (ref 98–107)
CO2 SERPL-SCNC: 22 MMOL/L (ref 22–29)
CREAT SERPL-MCNC: 1.37 MG/DL (ref 0.57–1)
CRP SERPL-MCNC: <0.3 MG/DL (ref 0–0.5)
D DIMER PPP FEU-MCNC: 1.14 MCGFEU/ML (ref 0–0.73)
D-LACTATE SERPL-SCNC: 2 MMOL/L (ref 0.5–2)
DEPRECATED RDW RBC AUTO: 42.8 FL (ref 37–54)
EGFRCR SERPLBLD CKD-EPI 2021: 40.9 ML/MIN/1.73
EOSINOPHIL # BLD AUTO: 0 10*3/MM3 (ref 0–0.4)
EOSINOPHIL NFR BLD AUTO: 0 % (ref 0.3–6.2)
ERYTHROCYTE [DISTWIDTH] IN BLOOD BY AUTOMATED COUNT: 12.3 % (ref 12.3–15.4)
FLUAV RNA RESP QL NAA+PROBE: NOT DETECTED
FLUAV SUBTYP SPEC NAA+PROBE: NOT DETECTED
FLUBV RNA ISLT QL NAA+PROBE: NOT DETECTED
FLUBV RNA RESP QL NAA+PROBE: NOT DETECTED
GLUCOSE SERPL-MCNC: 120 MG/DL (ref 65–99)
HADV DNA SPEC NAA+PROBE: NOT DETECTED
HCOV 229E RNA SPEC QL NAA+PROBE: NOT DETECTED
HCOV HKU1 RNA SPEC QL NAA+PROBE: NOT DETECTED
HCOV NL63 RNA SPEC QL NAA+PROBE: NOT DETECTED
HCOV OC43 RNA SPEC QL NAA+PROBE: NOT DETECTED
HCT VFR BLD AUTO: 41.1 % (ref 34–46.6)
HGB BLD-MCNC: 13.8 G/DL (ref 12–15.9)
HMPV RNA NPH QL NAA+NON-PROBE: NOT DETECTED
HPIV1 RNA ISLT QL NAA+PROBE: NOT DETECTED
HPIV2 RNA SPEC QL NAA+PROBE: NOT DETECTED
HPIV3 RNA NPH QL NAA+PROBE: NOT DETECTED
HPIV4 P GENE NPH QL NAA+PROBE: NOT DETECTED
IMM GRANULOCYTES # BLD AUTO: 0.11 10*3/MM3 (ref 0–0.05)
IMM GRANULOCYTES NFR BLD AUTO: 1 % (ref 0–0.5)
LYMPHOCYTES # BLD AUTO: 1.67 10*3/MM3 (ref 0.7–3.1)
LYMPHOCYTES NFR BLD AUTO: 15.2 % (ref 19.6–45.3)
M PNEUMO IGG SER IA-ACNC: NOT DETECTED
MCH RBC QN AUTO: 31.9 PG (ref 26.6–33)
MCHC RBC AUTO-ENTMCNC: 33.6 G/DL (ref 31.5–35.7)
MCV RBC AUTO: 95.1 FL (ref 79–97)
MONOCYTES # BLD AUTO: 0.67 10*3/MM3 (ref 0.1–0.9)
MONOCYTES NFR BLD AUTO: 6.1 % (ref 5–12)
NEUTROPHILS NFR BLD AUTO: 77.4 % (ref 42.7–76)
NEUTROPHILS NFR BLD AUTO: 8.53 10*3/MM3 (ref 1.7–7)
NRBC BLD AUTO-RTO: 0 /100 WBC (ref 0–0.2)
NT-PROBNP SERPL-MCNC: 364.9 PG/ML (ref 0–900)
PLATELET # BLD AUTO: 155 10*3/MM3 (ref 140–450)
PMV BLD AUTO: 11.7 FL (ref 6–12)
POTASSIUM SERPL-SCNC: 4.4 MMOL/L (ref 3.5–5.2)
PROCALCITONIN SERPL-MCNC: 0.07 NG/ML (ref 0–0.25)
RBC # BLD AUTO: 4.32 10*6/MM3 (ref 3.77–5.28)
RHINOVIRUS RNA SPEC NAA+PROBE: DETECTED
RSV RNA NPH QL NAA+NON-PROBE: NOT DETECTED
SARS-COV-2 RNA NPH QL NAA+NON-PROBE: NOT DETECTED
SARS-COV-2 RNA RESP QL NAA+PROBE: NOT DETECTED
SODIUM SERPL-SCNC: 139 MMOL/L (ref 136–145)
TROPONIN T SERPL HS-MCNC: 11 NG/L
WBC NRBC COR # BLD AUTO: 11.01 10*3/MM3 (ref 3.4–10.8)

## 2024-03-17 PROCEDURE — 25510000001 IOPAMIDOL PER 1 ML: Performed by: EMERGENCY MEDICINE

## 2024-03-17 PROCEDURE — 25010000002 METHYLPREDNISOLONE PER 125 MG: Performed by: EMERGENCY MEDICINE

## 2024-03-17 PROCEDURE — 0202U NFCT DS 22 TRGT SARS-COV-2: CPT | Performed by: INTERNAL MEDICINE

## 2024-03-17 PROCEDURE — 94640 AIRWAY INHALATION TREATMENT: CPT

## 2024-03-17 PROCEDURE — 85025 COMPLETE CBC W/AUTO DIFF WBC: CPT | Performed by: EMERGENCY MEDICINE

## 2024-03-17 PROCEDURE — 87254 VIRUS INOCULATION SHELL VIA: CPT | Performed by: INTERNAL MEDICINE

## 2024-03-17 PROCEDURE — 87636 SARSCOV2 & INF A&B AMP PRB: CPT | Performed by: EMERGENCY MEDICINE

## 2024-03-17 PROCEDURE — 94799 UNLISTED PULMONARY SVC/PX: CPT

## 2024-03-17 PROCEDURE — 86140 C-REACTIVE PROTEIN: CPT | Performed by: EMERGENCY MEDICINE

## 2024-03-17 PROCEDURE — 85379 FIBRIN DEGRADATION QUANT: CPT | Performed by: EMERGENCY MEDICINE

## 2024-03-17 PROCEDURE — 80048 BASIC METABOLIC PNL TOTAL CA: CPT | Performed by: EMERGENCY MEDICINE

## 2024-03-17 PROCEDURE — 84145 PROCALCITONIN (PCT): CPT | Performed by: EMERGENCY MEDICINE

## 2024-03-17 PROCEDURE — 83605 ASSAY OF LACTIC ACID: CPT | Performed by: EMERGENCY MEDICINE

## 2024-03-17 PROCEDURE — 87040 BLOOD CULTURE FOR BACTERIA: CPT | Performed by: EMERGENCY MEDICINE

## 2024-03-17 PROCEDURE — 71275 CT ANGIOGRAPHY CHEST: CPT

## 2024-03-17 PROCEDURE — 83880 ASSAY OF NATRIURETIC PEPTIDE: CPT | Performed by: EMERGENCY MEDICINE

## 2024-03-17 PROCEDURE — 71045 X-RAY EXAM CHEST 1 VIEW: CPT

## 2024-03-17 PROCEDURE — 84484 ASSAY OF TROPONIN QUANT: CPT | Performed by: EMERGENCY MEDICINE

## 2024-03-17 PROCEDURE — 99285 EMERGENCY DEPT VISIT HI MDM: CPT

## 2024-03-17 PROCEDURE — 25010000002 LEVOFLOXACIN PER 250 MG: Performed by: EMERGENCY MEDICINE

## 2024-03-17 PROCEDURE — 93005 ELECTROCARDIOGRAM TRACING: CPT | Performed by: EMERGENCY MEDICINE

## 2024-03-17 PROCEDURE — 36415 COLL VENOUS BLD VENIPUNCTURE: CPT

## 2024-03-17 RX ORDER — HYDROCHLOROTHIAZIDE 25 MG/1
12.5 TABLET ORAL
Status: DISCONTINUED | OUTPATIENT
Start: 2024-03-18 | End: 2024-03-18 | Stop reason: HOSPADM

## 2024-03-17 RX ORDER — SODIUM CHLORIDE 0.9 % (FLUSH) 0.9 %
10 SYRINGE (ML) INJECTION AS NEEDED
Status: DISCONTINUED | OUTPATIENT
Start: 2024-03-17 | End: 2024-03-18 | Stop reason: HOSPADM

## 2024-03-17 RX ORDER — BUDESONIDE AND FORMOTEROL FUMARATE DIHYDRATE 160; 4.5 UG/1; UG/1
2 AEROSOL RESPIRATORY (INHALATION)
Status: DISCONTINUED | OUTPATIENT
Start: 2024-03-17 | End: 2024-03-17

## 2024-03-17 RX ORDER — LEVOFLOXACIN 5 MG/ML
750 INJECTION, SOLUTION INTRAVENOUS ONCE
Status: COMPLETED | OUTPATIENT
Start: 2024-03-17 | End: 2024-03-17

## 2024-03-17 RX ORDER — IPRATROPIUM BROMIDE AND ALBUTEROL SULFATE 2.5; .5 MG/3ML; MG/3ML
3 SOLUTION RESPIRATORY (INHALATION) EVERY 4 HOURS PRN
Status: DISCONTINUED | OUTPATIENT
Start: 2024-03-17 | End: 2024-03-18 | Stop reason: HOSPADM

## 2024-03-17 RX ORDER — FLUTICASONE PROPIONATE 50 MCG
2 SPRAY, SUSPENSION (ML) NASAL DAILY
Status: DISCONTINUED | OUTPATIENT
Start: 2024-03-17 | End: 2024-03-18 | Stop reason: HOSPADM

## 2024-03-17 RX ORDER — METHYLPREDNISOLONE SODIUM SUCCINATE 125 MG/2ML
125 INJECTION, POWDER, LYOPHILIZED, FOR SOLUTION INTRAMUSCULAR; INTRAVENOUS ONCE
Status: COMPLETED | OUTPATIENT
Start: 2024-03-17 | End: 2024-03-17

## 2024-03-17 RX ORDER — SODIUM CHLORIDE 0.9 % (FLUSH) 0.9 %
10 SYRINGE (ML) INJECTION AS NEEDED
Status: DISCONTINUED | OUTPATIENT
Start: 2024-03-17 | End: 2024-03-17

## 2024-03-17 RX ORDER — IPRATROPIUM BROMIDE AND ALBUTEROL SULFATE 2.5; .5 MG/3ML; MG/3ML
3 SOLUTION RESPIRATORY (INHALATION) ONCE
Status: COMPLETED | OUTPATIENT
Start: 2024-03-17 | End: 2024-03-17

## 2024-03-17 RX ORDER — AMOXICILLIN 250 MG
2 CAPSULE ORAL 2 TIMES DAILY PRN
Status: DISCONTINUED | OUTPATIENT
Start: 2024-03-17 | End: 2024-03-18 | Stop reason: HOSPADM

## 2024-03-17 RX ORDER — ESCITALOPRAM OXALATE 10 MG/1
20 TABLET ORAL DAILY
Status: DISCONTINUED | OUTPATIENT
Start: 2024-03-18 | End: 2024-03-18 | Stop reason: HOSPADM

## 2024-03-17 RX ORDER — LISINOPRIL 10 MG/1
10 TABLET ORAL
Status: DISCONTINUED | OUTPATIENT
Start: 2024-03-18 | End: 2024-03-18 | Stop reason: HOSPADM

## 2024-03-17 RX ORDER — IPRATROPIUM BROMIDE AND ALBUTEROL SULFATE 2.5; .5 MG/3ML; MG/3ML
3 SOLUTION RESPIRATORY (INHALATION)
Status: DISCONTINUED | OUTPATIENT
Start: 2024-03-17 | End: 2024-03-17

## 2024-03-17 RX ORDER — SODIUM CHLORIDE 0.9 % (FLUSH) 0.9 %
10 SYRINGE (ML) INJECTION EVERY 12 HOURS SCHEDULED
Status: DISCONTINUED | OUTPATIENT
Start: 2024-03-17 | End: 2024-03-18 | Stop reason: HOSPADM

## 2024-03-17 RX ORDER — POLYETHYLENE GLYCOL 3350 17 G/17G
17 POWDER, FOR SOLUTION ORAL DAILY PRN
Status: DISCONTINUED | OUTPATIENT
Start: 2024-03-17 | End: 2024-03-18 | Stop reason: HOSPADM

## 2024-03-17 RX ORDER — BISACODYL 10 MG
10 SUPPOSITORY, RECTAL RECTAL DAILY PRN
Status: DISCONTINUED | OUTPATIENT
Start: 2024-03-17 | End: 2024-03-18 | Stop reason: HOSPADM

## 2024-03-17 RX ORDER — ALBUTEROL SULFATE 2.5 MG/3ML
2.5 SOLUTION RESPIRATORY (INHALATION) ONCE
Status: COMPLETED | OUTPATIENT
Start: 2024-03-17 | End: 2024-03-17

## 2024-03-17 RX ORDER — ALLOPURINOL 100 MG/1
100 TABLET ORAL DAILY
Status: DISCONTINUED | OUTPATIENT
Start: 2024-03-18 | End: 2024-03-18 | Stop reason: HOSPADM

## 2024-03-17 RX ORDER — SODIUM CHLORIDE 9 MG/ML
40 INJECTION, SOLUTION INTRAVENOUS AS NEEDED
Status: DISCONTINUED | OUTPATIENT
Start: 2024-03-17 | End: 2024-03-18 | Stop reason: HOSPADM

## 2024-03-17 RX ORDER — CETIRIZINE HYDROCHLORIDE 10 MG/1
10 TABLET ORAL DAILY
Status: DISCONTINUED | OUTPATIENT
Start: 2024-03-17 | End: 2024-03-18 | Stop reason: HOSPADM

## 2024-03-17 RX ORDER — ONDANSETRON 2 MG/ML
4 INJECTION INTRAMUSCULAR; INTRAVENOUS EVERY 6 HOURS PRN
Status: DISCONTINUED | OUTPATIENT
Start: 2024-03-17 | End: 2024-03-18 | Stop reason: HOSPADM

## 2024-03-17 RX ORDER — BISACODYL 5 MG/1
5 TABLET, DELAYED RELEASE ORAL DAILY PRN
Status: DISCONTINUED | OUTPATIENT
Start: 2024-03-17 | End: 2024-03-18 | Stop reason: HOSPADM

## 2024-03-17 RX ADMIN — LEVOFLOXACIN 750 MG: 750 INJECTION, SOLUTION INTRAVENOUS at 17:24

## 2024-03-17 RX ADMIN — Medication 10 ML: at 20:39

## 2024-03-17 RX ADMIN — IPRATROPIUM BROMIDE AND ALBUTEROL SULFATE 3 ML: .5; 3 SOLUTION RESPIRATORY (INHALATION) at 16:04

## 2024-03-17 RX ADMIN — ALBUTEROL SULFATE 2.5 MG: 2.5 SOLUTION RESPIRATORY (INHALATION) at 16:04

## 2024-03-17 RX ADMIN — FLUTICASONE PROPIONATE 2 SPRAY: 50 SPRAY, METERED NASAL at 20:38

## 2024-03-17 RX ADMIN — CETIRIZINE HYDROCHLORIDE 10 MG: 10 TABLET ORAL at 20:38

## 2024-03-17 RX ADMIN — METHYLPREDNISOLONE SODIUM SUCCINATE 125 MG: 125 INJECTION, POWDER, FOR SOLUTION INTRAMUSCULAR; INTRAVENOUS at 16:30

## 2024-03-17 RX ADMIN — IOPAMIDOL 74 ML: 755 INJECTION, SOLUTION INTRAVENOUS at 17:42

## 2024-03-17 NOTE — H&P
[facility] Medicine Services  HISTORY AND PHYSICAL    Date of Admission: 3/17/2024  Primary Care Physician: Michael Singleton,     Subjective   Primary Historian: patient     Chief Complaint: cough and sob     History of Present Illness  I am asked to  admit this 74-year-old woman who carries history of depression, hypertension, CKD stage III for concern of right lower lobe pneumonia and reportedly failed outpatient treatment    Patient presented with cough and shortness of breath  Patient came in hemodynamically stable and did not appear to be septic based on vital signs, patient has minimal elevation of white count at 11,000 with no left shift.  D-dimer is elevated at 1.14 but no evidence of PE  There was initial concern of pneumonia on chest x-ray however on CT scan of the chest with angiogram this was described to have bilateral low lung volumes.  Bilateral groundglass opacities which is suspected largely related to underexpansion/low lung volumes.  The radiologist indicates viral pneumonitis could have similar appearance.  No consolidation to suggest bacterial pneumonia    Her COVID-19, flu a and B are both negative    Patient told me in her words that about a week ago she felt sick (Saturday).  It got worse.  She went to [address].  She was given Z-Dandre and a shot of methylprednisolone.  She felt better but it hit to her again.  She said she was wheezing, coughing and blowing her nose.  Symptoms suggested that she could have reactive airway disease and/or allergy season.  She reports that she does not get this bad on change of season.    She has not endorse any fever    She further told me she had chest x-ray done.  I reviewed the chest x-ray done on March 12.  This was interpreted as chronic opacification of the right medial lung base secondary to chronic atelectasis.  No definite superimposed infiltrate.  Patient had x-ray imaging once again here followed by  CT angiogram of the chest.  Details as mentioned above.    Patient is currently receiving Levaquin.     Procalcitonin, CRP are both normal limits.  Her white count is slightly elevated with no left shift.  She did not have any eosinophilia.  She has chronic kidney disease stage IIIb with baseline creatinine about 1.4    Review of Systems   Otherwise complete ROS reviewed and negative except as mentioned in the HPI.    Past Medical History:   Past Medical History:   Diagnosis Date    Anxiety     CKD (chronic kidney disease) stage 3, GFR 30-59 ml/min     Depression     Hypertension     Seasonal allergies      Past Surgical History:  Past Surgical History:   Procedure Laterality Date    APPENDECTOMY       SECTION      CHOLECYSTECTOMY      KNEE SURGERY       Social History:  reports that she has never smoked. She has never been exposed to tobacco smoke. She has never used smokeless tobacco. She reports that she does not drink alcohol and does not use drugs.    Family History: family history includes Cancer in her father and mother; Heart disease in her father; No Known Problems in her brother, daughter, maternal aunt, maternal grandmother, paternal aunt, paternal grandmother, sister, and son.       Allergies:  Allergies   Allergen Reactions    Wellbutrin [Bupropion] GI Intolerance     constipation    Neosporin [Neomycin-Bacitracin Zn-Polymyx] Rash    Penicillins Rash       Medications:  Prior to Admission medications    Medication Sig Start Date End Date Taking? Authorizing Provider   allopurinol (ZYLOPRIM) 100 MG tablet Take 1 tablet by mouth Daily. 20   Provider, MD Petey   calcium carb-cholecalciferol 600-10 MG-MCG tablet per tablet TAKE 1 TABLET BY MOUTH EVERY DAY 3/4/24   Oly Fishman APRN   escitalopram (LEXAPRO) 20 MG tablet Take 1 tablet by mouth Daily. 24   Michael Singleton DO   fluticasone (FLONASE) 50 MCG/ACT nasal spray 2 sprays into the nostril(s) as directed by provider  "Daily. 2/12/24   Oly Fishman APRN   lisinopril-hydrochlorothiazide (PRINZIDE,ZESTORETIC) 20-12.5 MG per tablet Take 1 tablet by mouth Daily. 1/26/24   Michael Singleton,    Magnesium 200 MG chewable tablet Chew 2 each Daily. 250    Provider, MD Petey   Pseudoephedrine-Acetaminophen (SINUS PO) Take 1 tablet by mouth Daily.    Provider, MD Petey     I have utilized all available immediate resources to obtain, update, or review the patient's current medications (including all prescriptions, over-the-counter products, herbals, cannabis/cannabidiol products, and vitamin/mineral/dietary (nutritional) supplements).    Objective     Vital Signs: /61   Pulse 65   Temp 97.7 °F (36.5 °C)   Resp 22   Ht 160 cm (63\")   Wt 115 kg (254 lb)   SpO2 98%   BMI 44.99 kg/m²   Physical Exam   Morbid obesity with BMI of 45  Nontoxic pleasant woman  Not requiring any oxygen  GEN: Awake, alert, interactive, in NAD  HEENT: Atraumatic, PERRLA, EOMI, Anicteric, Trachea midline  Lungs: CTAB, no wheezing/rales/rhonchi, diminished breath sounds  Heart: RRR, +S1/s2, no rub  ABD: soft, nt/nd, +BS, no guarding/rebound truncal obesity  Extremities: atraumatic, no cyanosis, no edema  Skin: no rashes or lesions  Neuro: AAOx3, no focal deficits  Psych: normal mood & affect        Results Reviewed:  Lab Results (last 24 hours)       Procedure Component Value Units Date/Time    Blood Culture - Blood, Arm, Right [[id number]] Collected: 03/17/24 1722    Specimen: Blood from Arm, Right Updated: 03/17/24 1737    Blood Culture - Blood, Arm, Left [[id number]] Collected: 03/17/24 1715    Specimen: Blood from Arm, Left Updated: 03/17/24 1736    Procalcitonin [[id number]]  (Normal) Collected: 03/17/24 1625    Specimen: Blood Updated: 03/17/24 1703     Procalcitonin 0.07 ng/mL     Narrative:      As a Marker for Sepsis (Non-Neonates):    1. <0.5 ng/mL represents a low risk of severe sepsis and/or septic shock.  2. >2 ng/mL " "represents a high risk of severe sepsis and/or septic shock.    As a Marker for Lower Respiratory Tract Infections that require antibiotic therapy:    PCT on Admission    Antibiotic Therapy       6-12 Hrs later    >0.5                Strongly Recommended  >0.25 - <0.5        Recommended   0.1 - 0.25          Discouraged              Remeasure/reassess PCT  <0.1                Strongly Discouraged     Remeasure/reassess PCT    As 28 day mortality risk marker: \"Change in Procalcitonin Result\" (>80% or <=80%) if Day 0 (or Day 1) and Day 4 values are available. Refer to [email or web address]-[email or web address]-[email or web address]    Change in PCT <=80%  A decrease of PCT levels below or equal to 80% defines a positive change in PCT test result representing a higher risk for 28-day all-cause mortality of patients diagnosed with severe sepsis for septic shock.    Change in PCT >80%  A decrease of PCT levels of more than 80% defines a negative change in PCT result representing a lower risk for 28-day all-cause mortality of patients diagnosed with severe sepsis or septic shock.       C-reactive Protein [[id number]]  (Normal) Collected: 03/17/24 1625    Specimen: Blood Updated: 03/17/24 1701     C-Reactive Protein <0.30 mg/dL     Basic Metabolic Panel [[id number]]  (Abnormal) Collected: 03/17/24 1625    Specimen: Blood Updated: 03/17/24 1658     Glucose 120 mg/dL      BUN 54 mg/dL      Creatinine 1.37 mg/dL      Sodium 139 mmol/L      Potassium 4.4 mmol/L      Chloride 104 mmol/L      CO2 22.0 mmol/L      Calcium 9.9 mg/dL      BUN/Creatinine Ratio 39.4     Anion Gap 13.0 mmol/L      eGFR 40.9 mL/min/1.73     Narrative:      GFR Normal >60  Chronic Kidney Disease <60  Kidney Failure <15    The GFR formula is only valid for adults with stable renal function between ages 18 and 70.    BNP [[id number]]  (Normal) Collected: 03/17/24 1625    Specimen: Blood Updated: 03/17/24 1657     proBNP 364.9 pg/mL     Narrative:      This assay is used as an aid in " the diagnosis of individuals suspected of having heart failure. It can be used as an aid in the diagnosis of acute decompensated heart failure (ADHF) in patients presenting with signs and symptoms of ADHF to the emergency department (ED). In addition, NT-proBNP of <300 pg/mL indicates ADHF is not likely.    Age Range Result Interpretation  NT-proBNP Concentration (pg/mL:      <50             Positive            >450                   Gray                 300-450                    Negative             <300    50-75           Positive            >900                  Gray                300-900                  Negative            <300      >75             Positive            >1800                  Gray                300-1800                  Negative            <300    COVID PRE-OP / PRE-PROCEDURE SCREENING ORDER (NO ISOLATION) - Swab, Nasal Cavity [[id number]]  (Normal) Collected: 03/17/24 1626    Specimen: Swab from Nasal Cavity Updated: 03/17/24 1657    Narrative:      The following orders were created for panel order COVID PRE-OP / PRE-PROCEDURE SCREENING ORDER (NO ISOLATION) - Swab, Nasal Cavity.  Procedure                               Abnormality         Status                     ---------                               -----------         ------                     COVID-19 and FLU A/B PCR...[[id number]]  Normal              Final result                 Please view results for these tests on the individual orders.    COVID-19 and FLU A/B PCR, 1 HR TAT - Swab, Nasopharynx [[id number]]  (Normal) Collected: 03/17/24 1626    Specimen: Swab from Nasopharynx Updated: 03/17/24 1657     COVID19 Not Detected     Influenza A PCR Not Detected     Influenza B PCR Not Detected    Narrative:      Fact sheet for providers: https://www.fda.gov/media/880474/download    Fact sheet for patients: https://www.fda.gov/media/933147/download    Test performed by PCR.    Single High Sensitivity Troponin T [[id number]]  (Normal)  "Collected: 03/17/24 1625    Specimen: Blood Updated: 03/17/24 1656     HS Troponin T 11 ng/L     Narrative:      High Sensitive Troponin T Reference Range:  <14.0 ng/L- Negative Female for AMI  <22.0 ng/L- Negative Male for AMI  >=14 - Abnormal Female indicating possible myocardial injury.  >=22 - Abnormal Male indicating possible myocardial injury.   Clinicians would have to utilize clinical acumen, EKG, Troponin, and serial changes to determine if it is an Acute Myocardial Infarction or myocardial injury due to an underlying chronic condition.         Lactic Acid, Plasma [[id number]]  (Normal) Collected: 03/17/24 1625    Specimen: Blood Updated: 03/17/24 1656     Lactate 2.0 mmol/L     D-dimer, Quantitative [[id number]]  (Abnormal) Collected: 03/17/24 1625    Specimen: Blood Updated: 03/17/24 1647     D-Dimer, Quantitative 1.14 MCGFEU/mL     Narrative:      According to the assay 's published package insert, a normal (<0.50 MCGFEU/mL) D-dimer result in conjunction with a non-high clinical probability assessment, excludes deep vein thrombosis (DVT) and pulmonary embolism (PE) with high sensitivity.    D-dimer values increase with age and this can make VTE exclusion of an older population difficult. To address this, the [employer], based on best available evidence and recent guidelines, recommends that clinicians use age-adjusted D-dimer thresholds in patients greater than 50 years of age with: a) a low probability of PE who do not meet all Pulmonary Embolism Rule Out Criteria, or b) in those with intermediate probability of PE.   The formula for an age-adjusted D-dimer cut-off is \"age/100\".  For example, a 60 year old patient would have an age-adjusted cut-off of 0.60 MCGFEU/mL and an 80 year old 0.80 MCGFEU/mL.    CBC & Differential [[id number]]  (Abnormal) Collected: 03/17/24 1625    Specimen: Blood Updated: 03/17/24 1637    Narrative:      The following orders were created for panel " order CBC & Differential.  Procedure                               Abnormality         Status                     ---------                               -----------         ------                     CBC Auto Differential[[id number]]        Abnormal            Final result                 Please view results for these tests on the individual orders.    CBC Auto Differential [[id number]]  (Abnormal) Collected: 03/17/24 1625    Specimen: Blood Updated: 03/17/24 1637     WBC 11.01 10*3/mm3      RBC 4.32 10*6/mm3      Hemoglobin 13.8 g/dL      Hematocrit 41.1 %      MCV 95.1 fL      MCH 31.9 pg      MCHC 33.6 g/dL      RDW 12.3 %      RDW-SD 42.8 fl      MPV 11.7 fL      Platelets 155 10*3/mm3      Neutrophil % 77.4 %      Lymphocyte % 15.2 %      Monocyte % 6.1 %      Eosinophil % 0.0 %      Basophil % 0.3 %      Immature Grans % 1.0 %      Neutrophils, Absolute 8.53 10*3/mm3      Lymphocytes, Absolute 1.67 10*3/mm3      Monocytes, Absolute 0.67 10*3/mm3      Eosinophils, Absolute 0.00 10*3/mm3      Basophils, Absolute 0.03 10*3/mm3      Immature Grans, Absolute 0.11 10*3/mm3      nRBC 0.0 /100 WBC           Imaging Results (Last 24 Hours)       Procedure Component Value Units Date/Time    CT Angiogram Chest [[id number]] Collected: 03/17/24 1745     Updated: 03/17/24 1755    Narrative:      CT ANGIOGRAM CHEST- 3/17/2024 4:29 PM      HISTORY: Pulmonary embolism (PE) suspected, unknown D-dimer;  N18.9-Chronic kidney disease, unspecified; J18.9-Pneumonia, unspecified  organism      COMPARISON: CT scan dated 12/13/2021     DOSE LENGTH PRODUCT: 222.21 mGy.cm. Automated exposure control was also  utilized to decrease patient radiation dose.     TECHNIQUE: Helical tomographic images of the chest were obtained after  the administration of intravenous contrast following angiogram protocol.  Additionally, 3D and multiplanar reformatted images were provided.       FINDINGS:    Pulmonary arteries: There is adequate enhancement of  the pulmonary  arteries to evaluate for central and segmental pulmonary emboli. There  are no filling defects within the main, lobar, segmental or visualized  subsegmental pulmonary arteries. The pulmonary arteries are within  normal limits for size.     AORTA AND GREAT VESSELS: Thoracic aorta is normal in caliber. No  dissection identified. Minimal atheromatous plaque in the arch.     VISUALIZED NECK BASE: The imaged portion of the base of the neck appears  unremarkable.     LUNGS: Lung volumes are somewhat low. Multifocal bilateral geographic  groundglass opacities. No consolidation or pleural effusion. The airways  are clear.     HEART: The heart is normal in size. There is no pericardial effusion.  Coronary atheromatous calcification.     MEDIASTINUM AND LYMPH NODEs: No suspicious hilar or mediastinal  adenopathy..     SKELETAL AND SOFT TISSUES: Chest wall soft tissues are unremarkable. No  acute bony abnormality. Bridging anterior spinal osteophytes.     UPPER ABDOMEN: The imaged portion of the upper abdomen demonstrates no  acute process.       Impression:      1.  No evidence of pulmonary embolus.  2.  Bilateral low lung volumes. There are bilateral groundglass  opacities which I suspect is largely related to the under expansion/low  lung volumes. A viral pneumonitis could have a similar appearance. There  is no consolidation to suggest bacterial pneumonia.     This report was signed and finalized on 3/17/2024 5:52 PM by Dr Kamron Dyer.       XR Chest 1 View [[id number]] Collected: 03/17/24 1644     Updated: 03/17/24 1648    Narrative:      XR CHEST 1 VW- 3/17/2024 3:04 PM     HISTORY: soa       COMPARISON: Chest x-ray dated 3/12/2024     FINDINGS:  Upright frontal radiograph of the chest was obtained     New right infrahilar airspace disease with obscuration of the right  hemidiaphragm. The left lung is clear and well expanded. No pleural  effusion or pneumothorax. The cardiomediastinal silhouette and  pulmonary  vascularity are within normal limits. The osseous structures and  surrounding soft tissues demonstrate no acute abnormality.       Impression:      1.  New right infrahilar airspace disease concerning for a developing  pneumonia in the right lung base. Lungs are otherwise clear. No  parapneumonic effusion.     This report was signed and finalized on 3/17/2024 4:45 PM by Dr Kamron Dyer.             I have personally reviewed and interpreted the radiology studies and ECG obtained at time of admission.     Assessment / Plan   Assessment:   Active Hospital Problems    Diagnosis     **Pneumonia            Medical Decision Making  Number and Complexity of problems:   Sneezing, cough, wheezing possibly reactive airway disease, allergic rhinitis, possibly asthma  Hypertension  CKD stage IIIb  Anxiety disorder  Her past medical history indicates seasonal allergies.  Morbid obesity    Treatment Plan  The patient will be admitted to my service here at [facility].  She is currently not wheezing.  Patient may have reactive airway disease, allergic rhinitis, possibly asthma.  I do not think the patient has pneumonia.  Her procalcitonin, CRP and imaging study does not suggest so.  Her clinical symptoms suggest what has been stated above.  Will start on cetirizine  Continue on fluticasone  Consider steroid with bronchodilator combination (Symbicort)  If remain hemodynamically stable and possibly can be discharged home tomorrow  Patient received a dose of Levaquin which should be adequate for the next 24 hours  Admission observation offered to see course of condition.  They are agreeable to this.  Will check respiratory viral PCR  Conditions and Status  Fair, stable     MDM Data  External documents reviewed: Reviewed  Cardiac tracing (EKG, telemetry) interpretation: Sinus rhythm telemetry  Radiology interpretation: Reviewed chest x-ray from outside hospital as well as x-ray here including CT angiogram  Labs  reviewed: y  Any tests that were considered but not ordered: None     Decision rules/scores evaluated (example [id number], [place], etc): None     Discussed with: Patient and daughter  Care Planning  Shared decision making: Patient and daughter  Code status and discussions: Full code    Disposition  Social Determinants of Health that impact treatment or disposition: None identified at this time  Estimated length of stay is probably in the next 24 to 48 hours depending on clinical progress    I confirmed that the patient's advanced care plan is present, code status is documented, and a surrogate decision maker is listed in the patient's medical record.     The patient's surrogate decision maker is daughter    The patient was seen and examined by me on .    Electronically signed by Ricardo Buitrago MD, 03/17/24, 17:57 CDT.

## 2024-03-17 NOTE — ED PROVIDER NOTES
Subjective   History of Present Illness  Patient is a 74-year-old who came to the ER complaining of cough and congestion diagnosed as possible infection URI 7 days ago but antibiotics not getting any better    Cough  Cough characteristics:  Hacking  Sputum characteristics:  Nondescript  Onset quality:  Gradual  Duration:  7 days  Timing:  Intermittent  Chronicity:  New  Context: not animal exposure, not exposure to allergens, not sick contacts, not smoke exposure, not upper respiratory infection and not weather changes    Relieved by:  Nothing  Worsened by:  Nothing  Ineffective treatments:  None tried  Associated symptoms: shortness of breath    Associated symptoms: no chest pain, no chills, no diaphoresis, no ear pain, no eye discharge, no fever, no headaches, no myalgias, no rash, no sore throat, no weight loss and no wheezing    Risk factors: no chemical exposure    Shortness of Breath  Associated symptoms: cough    Associated symptoms: no abdominal pain, no chest pain, no diaphoresis, no ear pain, no fever, no headaches, no rash, no sore throat, no vomiting and no wheezing        Review of Systems   Constitutional: Negative.  Negative for activity change, appetite change, chills, diaphoresis, fatigue, fever and weight loss.   HENT:  Negative for congestion, drooling, ear pain, facial swelling, hearing loss, sinus pressure and sore throat.    Eyes: Negative.  Negative for discharge.   Respiratory:  Positive for cough and shortness of breath. Negative for wheezing.    Cardiovascular:  Negative for chest pain.   Gastrointestinal:  Negative for abdominal distention, abdominal pain, blood in stool, diarrhea, nausea and vomiting.   Endocrine: Negative.  Negative for cold intolerance, heat intolerance, polydipsia, polyphagia and polyuria.   Genitourinary: Negative.  Negative for dysuria, flank pain and urgency.   Musculoskeletal: Negative.  Negative for arthralgias, back pain, myalgias and neck stiffness.   Skin:  Negative.  Negative for color change, pallor and rash.   Allergic/Immunologic: Negative.    Neurological: Negative.  Negative for dizziness, seizures, speech difficulty, weakness, numbness and headaches.   Hematological: Negative.  Negative for adenopathy.   All other systems reviewed and are negative.      Past Medical History:   Diagnosis Date    Anxiety     CKD (chronic kidney disease) stage 3, GFR 30-59 ml/min     Depression     Hypertension     Seasonal allergies        Allergies   Allergen Reactions    Wellbutrin [Bupropion] GI Intolerance     constipation    Neosporin [Neomycin-Bacitracin Zn-Polymyx] Rash    Penicillins Rash       Past Surgical History:   Procedure Laterality Date    APPENDECTOMY       SECTION      CHOLECYSTECTOMY      KNEE SURGERY         Family History   Problem Relation Age of Onset    Cancer Mother         non-hodgkins lymphoma    Heart disease Father     Cancer Father         leukemia    No Known Problems Brother     No Known Problems Sister     No Known Problems Daughter     No Known Problems Son     No Known Problems Maternal Grandmother     No Known Problems Paternal Grandmother     No Known Problems Maternal Aunt     No Known Problems Paternal Aunt     BRCA 1/2 Neg Hx     Breast cancer Neg Hx     Colon cancer Neg Hx     Endometrial cancer Neg Hx     Ovarian cancer Neg Hx     Uterine cancer Neg Hx        Social History     Socioeconomic History    Marital status:    Tobacco Use    Smoking status: Never     Passive exposure: Never    Smokeless tobacco: Never   Vaping Use    Vaping status: Never Used   Substance and Sexual Activity    Alcohol use: No    Drug use: No    Sexual activity: Never           Objective   Physical Exam  Vitals and nursing note reviewed. Exam conducted with a chaperone present.   Constitutional:       General: She is not in acute distress.     Appearance: Normal appearance. She is well-developed. She is not toxic-appearing or diaphoretic.   HENT:       Head: Normocephalic and atraumatic.      Right Ear: External ear normal.      Nose: Nose normal.      Mouth/Throat:      Mouth: Mucous membranes are moist.   Eyes:      Conjunctiva/sclera: Conjunctivae normal.      Pupils: Pupils are equal, round, and reactive to light.   Cardiovascular:      Rate and Rhythm: Normal rate and regular rhythm.      Chest Wall: PMI is not displaced.      Pulses: Normal pulses. No decreased pulses.      Heart sounds: Normal heart sounds. No murmur heard.  Pulmonary:      Effort: Pulmonary effort is normal. Tachypnea present. No accessory muscle usage or respiratory distress.      Breath sounds: No stridor. Examination of the right-lower field reveals rhonchi and rales. Examination of the left-lower field reveals rhonchi and rales. Rhonchi and rales present. No decreased breath sounds or wheezing.   Chest:      Chest wall: No tenderness or crepitus.   Abdominal:      General: Bowel sounds are normal. There is no distension.      Palpations: Abdomen is soft.      Tenderness: There is no abdominal tenderness.   Musculoskeletal:         General: No swelling or tenderness. Normal range of motion.      Cervical back: Normal range of motion and neck supple. No rigidity.      Right lower leg: No edema.      Left lower leg: No edema.      Comments: Lower extremity exam bilaterally is unremarkable.  There is no right or left calf tenderness .  There is no palpable venous cord.  No obvious difference in the size of the legs.  No pitting edema.  The dorsalis pedis and posterior tibial femoral and popliteal pulses are palpable and +2 bilaterally.  Homans sign is negative   Skin:     General: Skin is warm.      Capillary Refill: Capillary refill takes less than 2 seconds.      Coloration: Skin is not jaundiced.      Findings: No erythema or rash.   Neurological:      General: No focal deficit present.      Mental Status: She is alert and oriented to person, place, and time. Mental status is at  baseline.      Cranial Nerves: No cranial nerve deficit.      Motor: No weakness.      Coordination: Coordination normal.      Deep Tendon Reflexes: Reflexes are normal and symmetric. Reflexes normal.   Psychiatric:         Mood and Affect: Mood normal.         Procedures           ED Course  ED Course as of 03/17/24 1729   Sun Mar 17, 2024   1606 Absent P waves.  Regular rate [TS]   1650 Well score 3 [TS]   1713 She has been read with antibiotics for 7 days not getting any relief has tried Zithromax at home had a chest x-ray done yesterday and chest x-ray done today which show significant worsening of patchy infiltrate. [TS]   1714 Curb 65 score will be 2 [TS]   1727 Patient came into the ED with shortness of breath cough and congestion worsening pneumonia will need to be admitted to medicine service.  Case turned with Dr. Pierre pending CT report. [TS]      ED Course User Index  [TS] Link Mathur MD                                             Medical Decision Making  Differential Diagnosis:  I considered pulmonary etiology, asthma, chronic obstructive pulmonary disease, pneumonia, pulmonary embolism, adult respiratory distress syndrome, pneumothorax, pleural effusion, pulmonary fibrosis, cardiac etiology, congestive heart failure, myocardial infarction, metabolic etiology, diabetic ketoacidosis, uremia, acidosis, sepsis, anemia, drug related etiology, hyperventilation and CNS disease as a possible cause of dyspnea in this patient. This is a partial list of diagnoses considered.           Problems Addressed:  Chronic kidney disease, unspecified CKD stage: complicated acute illness or injury  Pneumonia due to infectious organism, unspecified laterality, unspecified part of lung: complicated acute illness or injury    Amount and/or Complexity of Data Reviewed  Labs: ordered.  Radiology: ordered.  ECG/medicine tests: ordered.    Risk  Prescription drug management.        Final diagnoses:   Chronic kidney disease,  unspecified CKD stage   Pneumonia due to infectious organism, unspecified laterality, unspecified part of lung       ED Disposition  ED Disposition       ED Disposition   Intended Admit    Condition   --    Comment   --               No follow-up provider specified.       Medication List      No changes were made to your prescriptions during this visit.            Link Mathur MD  03/17/24 5841       Link Mathur MD  03/17/24 1228

## 2024-03-18 ENCOUNTER — READMISSION MANAGEMENT (OUTPATIENT)
Dept: CALL CENTER | Facility: HOSPITAL | Age: 74
End: 2024-03-18
Payer: MEDICARE

## 2024-03-18 VITALS
SYSTOLIC BLOOD PRESSURE: 156 MMHG | RESPIRATION RATE: 16 BRPM | WEIGHT: 257.28 LBS | OXYGEN SATURATION: 98 % | TEMPERATURE: 97.9 F | DIASTOLIC BLOOD PRESSURE: 61 MMHG | HEART RATE: 55 BPM | HEIGHT: 63 IN | BODY MASS INDEX: 45.59 KG/M2

## 2024-03-18 PROBLEM — B34.8 RHINOVIRUS INFECTION: Status: ACTIVE | Noted: 2024-03-18

## 2024-03-18 LAB
BASOPHILS # BLD AUTO: 0.02 10*3/MM3 (ref 0–0.2)
BASOPHILS NFR BLD AUTO: 0.2 % (ref 0–1.5)
DEPRECATED RDW RBC AUTO: 42.5 FL (ref 37–54)
EOSINOPHIL # BLD AUTO: 0 10*3/MM3 (ref 0–0.4)
EOSINOPHIL NFR BLD AUTO: 0 % (ref 0.3–6.2)
ERYTHROCYTE [DISTWIDTH] IN BLOOD BY AUTOMATED COUNT: 12.2 % (ref 12.3–15.4)
HCT VFR BLD AUTO: 37.4 % (ref 34–46.6)
HGB BLD-MCNC: 12.2 G/DL (ref 12–15.9)
LYMPHOCYTES # BLD AUTO: 0.57 10*3/MM3 (ref 0.7–3.1)
LYMPHOCYTES NFR BLD AUTO: 5.7 % (ref 19.6–45.3)
MCH RBC QN AUTO: 31.3 PG (ref 26.6–33)
MCHC RBC AUTO-ENTMCNC: 32.6 G/DL (ref 31.5–35.7)
MCV RBC AUTO: 95.9 FL (ref 79–97)
MONOCYTES # BLD AUTO: 0.16 10*3/MM3 (ref 0.1–0.9)
MONOCYTES NFR BLD AUTO: 1.6 % (ref 5–12)
NEUTROPHILS NFR BLD AUTO: 9.21 10*3/MM3 (ref 1.7–7)
NEUTROPHILS NFR BLD AUTO: 91.3 % (ref 42.7–76)
PLATELET # BLD AUTO: 226 10*3/MM3 (ref 140–450)
PMV BLD AUTO: 11.6 FL (ref 6–12)
PROCALCITONIN SERPL-MCNC: 0.08 NG/ML (ref 0–0.25)
RBC # BLD AUTO: 3.9 10*6/MM3 (ref 3.77–5.28)
WBC NRBC COR # BLD AUTO: 10.08 10*3/MM3 (ref 3.4–10.8)

## 2024-03-18 PROCEDURE — 85025 COMPLETE CBC W/AUTO DIFF WBC: CPT | Performed by: INTERNAL MEDICINE

## 2024-03-18 PROCEDURE — 84145 PROCALCITONIN (PCT): CPT | Performed by: INTERNAL MEDICINE

## 2024-03-18 PROCEDURE — 36415 COLL VENOUS BLD VENIPUNCTURE: CPT | Performed by: INTERNAL MEDICINE

## 2024-03-18 RX ORDER — CALCIUM CARBONATE/VITAMIN D3 600 MG-10
1 TABLET ORAL DAILY
COMMUNITY

## 2024-03-18 RX ORDER — CETIRIZINE HYDROCHLORIDE 10 MG/1
10 TABLET ORAL DAILY
Start: 2024-03-19

## 2024-03-18 RX ADMIN — CETIRIZINE HYDROCHLORIDE 10 MG: 10 TABLET ORAL at 08:28

## 2024-03-18 RX ADMIN — FLUTICASONE PROPIONATE 2 SPRAY: 50 SPRAY, METERED NASAL at 08:29

## 2024-03-18 RX ADMIN — Medication 10 ML: at 08:29

## 2024-03-18 RX ADMIN — LISINOPRIL 10 MG: 10 TABLET ORAL at 08:28

## 2024-03-18 RX ADMIN — HYDROCHLOROTHIAZIDE 12.5 MG: 25 TABLET ORAL at 08:28

## 2024-03-18 RX ADMIN — ALLOPURINOL 100 MG: 100 TABLET ORAL at 08:28

## 2024-03-18 RX ADMIN — ESCITALOPRAM OXALATE 20 MG: 10 TABLET ORAL at 08:28

## 2024-03-18 NOTE — OUTREACH NOTE
Prep Survey      Flowsheet Row Responses   [facility] patient discharged from? [place]   Is LACE score < 7 ? Yes   Eligibility [facility]   Date of Admission 03/17/24   Date of Discharge 03/18/24   Discharge Disposition Home or Self Care   Discharge diagnosis Rhinovirus infection   Does the patient have one of the following disease processes/diagnoses(primary or secondary)? Other   Does the patient have Home health ordered? No   Is there a DME ordered? No   Prep survey completed? Yes            Gabi ROBLES - Registered Nurse

## 2024-03-18 NOTE — DISCHARGE SUMMARY
[facility] Medicine Services  DISCHARGE SUMMARY       Date of Admission: 3/17/2024  Date of Discharge:  3/18/2024  Primary Care Physician: Michael Singleton DO    Presenting Problem/History of Present Illness:  Shortness of breath    Final Discharge Diagnoses:  Active Hospital Problems    Diagnosis     **Rhinovirus infection     Class 3 severe obesity with body mass index (BMI) of 45.0 to 49.9 in adult     Stage 3b chronic kidney disease     Primary hypertension        Consults: none.    Procedures Performed: none.    Pertinent Test Results:   Results for orders placed during the hospital encounter of 12/13/21    Adult Transthoracic Echo Complete W/ Cont if Necessary Per Protocol    Interpretation Summary  · Left ventricular diastolic function is consistent with age.  · Left ventricular ejection fraction appears to be 61 - 65%. Left ventricular systolic function is normal.  · Normal right ventricular cavity size and systolic function noted.  · There were no significant (greater than mild) valvular dysfunction.      Imaging Results (All)       Procedure Component Value Units Date/Time    CT Angiogram Chest [[id number]] Collected: 03/17/24 1745     Updated: 03/17/24 1755    Narrative:      CT ANGIOGRAM CHEST- 3/17/2024 4:29 PM      HISTORY: Pulmonary embolism (PE) suspected, unknown D-dimer;  N18.9-Chronic kidney disease, unspecified; J18.9-Pneumonia, unspecified  organism      COMPARISON: CT scan dated 12/13/2021     DOSE LENGTH PRODUCT: 222.21 mGy.cm. Automated exposure control was also  utilized to decrease patient radiation dose.     TECHNIQUE: Helical tomographic images of the chest were obtained after  the administration of intravenous contrast following angiogram protocol.  Additionally, 3D and multiplanar reformatted images were provided.       FINDINGS:    Pulmonary arteries: There is adequate enhancement of the pulmonary  arteries to evaluate for central and  segmental pulmonary emboli. There  are no filling defects within the main, lobar, segmental or visualized  subsegmental pulmonary arteries. The pulmonary arteries are within  normal limits for size.     AORTA AND GREAT VESSELS: Thoracic aorta is normal in caliber. No  dissection identified. Minimal atheromatous plaque in the arch.     VISUALIZED NECK BASE: The imaged portion of the base of the neck appears  unremarkable.     LUNGS: Lung volumes are somewhat low. Multifocal bilateral geographic  groundglass opacities. No consolidation or pleural effusion. The airways  are clear.     HEART: The heart is normal in size. There is no pericardial effusion.  Coronary atheromatous calcification.     MEDIASTINUM AND LYMPH NODEs: No suspicious hilar or mediastinal  adenopathy..     SKELETAL AND SOFT TISSUES: Chest wall soft tissues are unremarkable. No  acute bony abnormality. Bridging anterior spinal osteophytes.     UPPER ABDOMEN: The imaged portion of the upper abdomen demonstrates no  acute process.       Impression:      1.  No evidence of pulmonary embolus.  2.  Bilateral low lung volumes. There are bilateral groundglass  opacities which I suspect is largely related to the under expansion/low  lung volumes. A viral pneumonitis could have a similar appearance. There  is no consolidation to suggest bacterial pneumonia.     This report was signed and finalized on 3/17/2024 5:52 PM by Dr Kamron Dyer.       XR Chest 1 View [[id number]] Collected: 03/17/24 1644     Updated: 03/17/24 1648    Narrative:      XR CHEST 1 VW- 3/17/2024 3:04 PM     HISTORY: soa       COMPARISON: Chest x-ray dated 3/12/2024     FINDINGS:  Upright frontal radiograph of the chest was obtained     New right infrahilar airspace disease with obscuration of the right  hemidiaphragm. The left lung is clear and well expanded. No pleural  effusion or pneumothorax. The cardiomediastinal silhouette and pulmonary  vascularity are within normal limits. The  osseous structures and  surrounding soft tissues demonstrate no acute abnormality.       Impression:      1.  New right infrahilar airspace disease concerning for a developing  pneumonia in the right lung base. Lungs are otherwise clear. No  parapneumonic effusion.     This report was signed and finalized on 3/17/2024 4:45 PM by Dr Kamron Dyer.             LAB RESULTS:      Lab 03/18/24  0502 03/17/24  1625   WBC 10.08 11.01*   HEMOGLOBIN 12.2 13.8   HEMATOCRIT 37.4 41.1   PLATELETS 226 155   NEUTROS ABS 9.21* 8.53*   IMMATURE GRANS (ABS)  --  0.11*   LYMPHS ABS 0.57* 1.67   MONOS ABS 0.16 0.67   EOS ABS 0.00 0.00   MCV 95.9 95.1   CRP  --  <0.30   PROCALCITONIN 0.08 0.07   LACTATE  --  2.0   D DIMER QUANT  --  1.14*         Lab 03/17/24  1625   SODIUM 139   POTASSIUM 4.4   CHLORIDE 104   CO2 22.0   ANION GAP 13.0   BUN 54*   CREATININE 1.37*   EGFR 40.9*   GLUCOSE 120*   CALCIUM 9.9             Lab 03/17/24  1625   PROBNP 364.9   HSTROP T 11                 Brief Urine Lab Results       None          Microbiology Results (last 10 days)       Procedure Component Value - Date/Time    Respiratory Panel PCR w/COVID-19(SARS-CoV-2) LEANDRO/JOE/LINA/PAD/COR/BERNA In-House, NP Swab in UTM/VTM, 2 HR TAT - Swab, Nasopharynx [[id number]]  (Abnormal) Collected: 03/17/24 2039    Lab Status: Final result Specimen: Swab from Nasopharynx Updated: 03/17/24 2206     ADENOVIRUS, PCR Not Detected     Coronavirus 229E Not Detected     Coronavirus HKU1 Not Detected     Coronavirus NL63 Not Detected     Coronavirus OC43 Not Detected     COVID19 Not Detected     Human Metapneumovirus Not Detected     Human Rhinovirus/Enterovirus Detected     Influenza A PCR Not Detected     Influenza B PCR Not Detected     Parainfluenza Virus 1 Not Detected     Parainfluenza Virus 2 Not Detected     Parainfluenza Virus 3 Not Detected     Parainfluenza Virus 4 Not Detected     RSV, PCR Not Detected     Bordetella pertussis pcr Not Detected     Bordetella  parapertussis PCR Not Detected     Chlamydophila pneumoniae PCR Not Detected     Mycoplasma pneumo by PCR Not Detected    Narrative:      In the setting of a positive respiratory panel with a viral infection PLUS a negative procalcitonin without other underlying concern for bacterial infection, consider observing off antibiotics or discontinuation of antibiotics and continue supportive care. If the respiratory panel is positive for atypical bacterial infection (Bordetella pertussis, Chlamydophila pneumoniae, or Mycoplasma pneumoniae), consider antibiotic de-escalation to target atypical bacterial infection.    COVID PRE-OP / PRE-PROCEDURE SCREENING ORDER (NO ISOLATION) - Swab, Nasal Cavity [[id number]]  (Normal) Collected: 03/17/24 1626    Lab Status: Final result Specimen: Swab from Nasal Cavity Updated: 03/17/24 1657    Narrative:      The following orders were created for panel order COVID PRE-OP / PRE-PROCEDURE SCREENING ORDER (NO ISOLATION) - Swab, Nasal Cavity.  Procedure                               Abnormality         Status                     ---------                               -----------         ------                     COVID-19 and FLU A/B PCR...[[id number]]  Normal              Final result                 Please view results for these tests on the individual orders.    COVID-19 and FLU A/B PCR, 1 HR TAT - Swab, Nasopharynx [[id number]]  (Normal) Collected: 03/17/24 1626    Lab Status: Final result Specimen: Swab from Nasopharynx Updated: 03/17/24 1657     COVID19 Not Detected     Influenza A PCR Not Detected     Influenza B PCR Not Detected    Narrative:      Fact sheet for providers: https://www.fda.gov/media/600787/download    Fact sheet for patients: https://www.fda.gov/media/871479/download    Test performed by PCR.            Hospital Course:   Ms. Littlejohn is a 74-year-old female who presented to [facility] on 3/17 for shortness of breath and cough.  Patient presented with 1  "week long history of worsening shortness of breath and cough.  She went to [address] [place] [address] and was given a Z-Dandre and a shot of methylprednisone.  She had a chest x-ray on 3/12 that shows no acute findings.  In the ED, COVID/flu negative.  WBC, CRP and procalcitonin normal.      Respiratory panel positive for human rhinovirus/enterovirus.  CT angiogram showed no pulmonary embolus.  Bilateral low lung volumes, underexpansion, low lung volumes.  Viral pneumonitis could have a similar appearance.  No consolidation. No fever.  WBC, CRP and procalcitonin normal.  In the ED, she was given a one-time dose of Levaquin and Solu-Medrol 125 mg x 1.  She is not wheezing on exam today and feels much better overall.  On room air.  No cough.  She has been up walking to the bathroom without difficulty.  She feels ready for discharge.  She has reached maximum benefit of hospitalization.  She is medically stable and appropriate for discharge today.    Physical Exam on Discharge:  /61 (BP Location: Right arm, Patient Position: Lying)   Pulse 55   Temp 97.9 °F (36.6 °C) (Oral)   Resp 16   Ht 160 cm (63\")   Wt 117 kg (257 lb 4.4 oz)   SpO2 98%   BMI 45.57 kg/m²   Physical Exam  Vitals reviewed.   Constitutional:       General: She is not in acute distress.     Appearance: She is morbidly obese. She is not toxic-appearing.      Comments: Sitting up in bed.  No acute distress.  On room air.  No family bedside.  Discussed with her nurse Pearl.   HENT:      Head: Normocephalic and atraumatic.      Mouth/Throat:      Mouth: Mucous membranes are moist.      Pharynx: Oropharynx is clear.   Eyes:      Extraocular Movements: Extraocular movements intact.      Conjunctiva/sclera: Conjunctivae normal.      Pupils: Pupils are equal, round, and reactive to light.   Cardiovascular:      Rate and Rhythm: Normal rate and regular rhythm.      Pulses: Normal pulses.   Pulmonary:      Effort: Pulmonary effort is normal. No respiratory distress.      " Breath sounds: Normal breath sounds. No wheezing or rhonchi.   Abdominal:      General: Bowel sounds are normal. There is no distension.      Palpations: Abdomen is soft.      Tenderness: There is no abdominal tenderness.   Musculoskeletal:         General: No swelling or tenderness. Normal range of motion.      Cervical back: Normal range of motion and neck supple. No muscular tenderness.   Skin:     General: Skin is warm and dry.      Findings: No erythema or rash.   Neurological:      General: No focal deficit present.      Mental Status: She is alert and oriented to person, place, and time.      Cranial Nerves: No cranial nerve deficit.      Motor: No weakness.   Psychiatric:         Mood and Affect: Mood normal.         Behavior: Behavior normal.       Condition on Discharge: medically stable.    Discharge Disposition:  Home or Self Care    Discharge Medications:     Discharge Medications        New Medications        Instructions Start Date   cetirizine 10 MG tablet  Commonly known as: zyrTEC   10 mg, Oral, Daily, Obtain OTC   Start Date: March 19, 2024            Continue These Medications        Instructions Start Date   allopurinol 100 MG tablet  Commonly known as: ZYLOPRIM   100 mg, Oral, Daily      Calcium 600+D3 600-10 MG-MCG tablet per tablet  Generic drug: calcium carb-cholecalciferol   1 tablet, Oral, Daily      escitalopram 20 MG tablet  Commonly known as: LEXAPRO   20 mg, Oral, Daily      fluticasone 50 MCG/ACT nasal spray  Commonly known as: FLONASE   2 sprays, Nasal, Daily      lisinopril-hydrochlorothiazide 20-12.5 MG per tablet  Commonly known as: PRINZIDE,ZESTORETIC   1 tablet, Oral, Daily      Magnesium 250 MG tablet   2 each, Oral, Daily      SINUS PO   1 tablet, Oral, Daily             Discharge Diet:   Diet Instructions       Diet: Regular/House Diet; Regular (IDDSI 7); Thin (IDDSI 0)      Discharge Diet: Regular/House Diet    Texture: Regular (IDDSI 7)    Fluid Consistency: Thin (IDDSI 0)             Activity at Discharge:   Activity Instructions       Activity as Tolerated              Follow-up Appointments:   1.  Follow-up with primary care provider in 1 week.  Future Appointments   Date Time Provider Department Center   3/25/2024  2:00 PM Oly Fishman APRN [facility]   7/23/2024 10:00 AM PAD BIC MAMM 2 BH PAD MA [facility]   7/23/2024 10:20 AM PAD [facility] DEXA 1 BH PAD DX [facility]   7/26/2024 10:30 AM Oly Fishman APRN [facility]       Test Results Pending at Discharge: none.    Electronically signed by ANY Marley, 03/18/24, 11:05 CDT.    Time: 35 minutes.

## 2024-03-18 NOTE — PLAN OF CARE
Goal Outcome Evaluation:      Patient received from ED in no distress. Up adlib. Room air. Receiving Levaquin on arrival. No complaints of pain or SOA. This nurse collected a respiratory panel. Patient positive for Rhinovirus. Safety maintained.

## 2024-03-18 NOTE — PLAN OF CARE
Problem: Adult Inpatient Plan of Care  Goal: Plan of Care Review  Outcome: Met  Flowsheets (Taken 3/18/2024 6407)  Outcome Evaluation: Patient RA. IV CDI, IID. Up adlib. SCD's for DVT. Tolerating reg diet. VSS, safety maintained.

## 2024-03-18 NOTE — PROGRESS NOTES
Assessment tool to be used for patients with existing breathing treatments ordered by hospitalist                               Respiratory Therapist Driven Protocol - RT to Assess and Treat Algorithm    Item 0 Points 1 Point 2 Points 3 Points 4 Points Subtotal   Mental Status Alert, orientated, cooperative Lethargic, follows commands Confused, not following commands Obtunded or Somnolent Comatose 0   Respiratory Pattern Regular RR  8-16 breaths/minute Increased RR  18-25 breaths/minute Dyspnea on exertion, irregular RR  26-30/minute Shortness of breath,  RR 31-35/minute Accessory muscle use, severe SOB  RR > 35/minute 1   Breath Sounds Clear Decreased unilaterally Decreased bilaterally Basilar crackles Wheezing and/or rhonchi 0     Cough Strong, spontaneous non-productive Strong productive Weak, non-productive Weak productive or weak with rhonchi Absent or may require suctioning 0   Pulmonary Status Nonsmoker or no previous history > 1 year quit < 1 PPD  < 1 year quit >  or = 1 PPD Diagnosed pulmonary disease (severe or chronic) Severe or chronic pulmonary disease with exacerbation 0   Surgical Status None General surgery (non-abdominal or non-thoracic) Lower abdominal Thoracic or upper abdominal Thoracic with pulmonary disease 0   Chest X-ray Clear Chronic changes Infiltrates, atelectasis or pleural effusion Infiltrates > 1 lobe Diffuse infiltrates and atelectasis and/or effusions 1   Activity level Ambulatory Ambulatory with assistance Non-ambulatory Paraplegic Quadriplegic 0                     Total Score 2   Score    Drug Therapy Frequency  20 or >    Q4 Duoneb & Q3 Albuterol PRN 15 - 19     Q6 Duoneb & Q4 Albuterol PRN 10 - 14    QID Duoneb & Q4 Albuterol PRN 5 - 9    TID Duoneb & Q6 Albuterol PRN 0 - 4    Q4 PRN Duoneb or Q4 PRN Albuterol    Incentive Spirometry - Initial RT instruct    Lung Expansion Therapy (PEP) Bronchopulmonary Hygiene (CPT)   Q4 & PRN - Severe atelectasis, poor  oxygenation Q4 - copious secretions, dyspnea, unable to sleep, mucus plugging   QID - High risk for persistent atelectasis, existence of atelectasis QID & Q4 PRN - Moderate secretion production   TID - At risk for developing atelectasis TID - small amounts of secretions with poor cough   BID - prevention of atelectasis BID - unable to breathe deeply and cough spontaneously   *RT Protocol patients will be re-assessed/re-evaluated every 48 hours.    *Patients who are home nebulizer treatments will be protocoled to no less than their home regimen and will remain     on their home regimen with re-evaluations as needed with changes in patient condition.    RT Comments/Recommendations: Q4 PRN Duoneb    Xray:  New right infrahilar airspace disease concerning for a developing  pneumonia in the right lung base. Lungs are otherwise clear. No  parapneumonic effusion.  CT:kailey volumes are somewhat low. Multifocal bilateral geographic  groundglass opacities. No consolidation or pleural effusion. The airways  are clear.

## 2024-03-19 ENCOUNTER — TRANSITIONAL CARE MANAGEMENT TELEPHONE ENCOUNTER (OUTPATIENT)
Dept: CALL CENTER | Facility: HOSPITAL | Age: 74
End: 2024-03-19
Payer: MEDICARE

## 2024-03-19 NOTE — OUTREACH NOTE
Call Center TCM Note      Flowsheet Row Responses   [facility] patient discharged from? [place]   Does the patient have one of the following disease processes/diagnoses(primary or secondary)? Other   TCM attempt successful? No   Unsuccessful attempts Attempt 2            Ashli Leal RN    3/19/2024, 15:00 EDT

## 2024-03-19 NOTE — OUTREACH NOTE
Call Center TCM Note      Flowsheet Row Responses   [facility] [place] patient discharged from? [place]   Does the patient have one of the following disease processes/diagnoses(primary or secondary)? Other   TCM attempt successful? No   Unsuccessful attempts Attempt 1            Ashli Leal RN    3/19/2024, 09:17 EDT

## 2024-03-20 ENCOUNTER — TRANSITIONAL CARE MANAGEMENT TELEPHONE ENCOUNTER (OUTPATIENT)
Dept: CALL CENTER | Facility: HOSPITAL | Age: 74
End: 2024-03-20
Payer: MEDICARE

## 2024-03-20 LAB
QT INTERVAL: 430 MS
QTC INTERVAL: 392 MS

## 2024-03-20 NOTE — OUTREACH NOTE
Call Center TCM Note      Flowsheet Row Responses   [facility] patient discharged from? [place]   Does the patient have one of the following disease processes/diagnoses(primary or secondary)? Other   TCM attempt successful? Yes   Call start time 1400   Call end time 1403   Discharge diagnosis Rhinovirus infection   Person spoke with today (if not patient) and relationship Patient   Meds reviewed with patient/caregiver? Yes  [New: zyrtec]   Is the patient having any side effects they believe may be caused by any medication additions or changes? Yes   Side effects comments  Makes her drowsy. We discussed her taking at bedtime instead of in the morning.   Does the patient have all medications ordered at discharge? Yes   Is the patient taking all medications as directed (includes completed medication regime)? Yes   Comments PCP Dr Singleton. Hospital Follow Up with Oly Fishman 3/25/24  2pm   Does the patient have an appointment with their PCP within 7-14 days of discharge? Yes   Has home health visited the patient within 72 hours of discharge? N/A   Psychosocial issues? No   Did the patient receive a copy of their discharge instructions? Yes   Nursing interventions Reviewed instructions with patient   What is the patient's perception of their health status since discharge? Improving   Is the patient/caregiver able to teach back signs and symptoms related to disease process for when to call PCP? Yes   Is the patient/caregiver able to teach back the hierarchy of who to call/visit for symptoms/problems? PCP, Specialist, Home health nurse, Urgent Care, ED, 911 Yes   If the patient is a current smoker, are they able to teach back resources for cessation? Not a smoker   TCM call completed? Yes   Call end time 1403   Would this patient benefit from a Referral to Amb Social Work? No   Is the patient interested in additional calls from an ambulatory ? No            Charo Mckeon, RN    3/20/2024, 14:04  CDT

## 2024-03-22 LAB
BACTERIA SPEC AEROBE CULT: NORMAL
BACTERIA SPEC AEROBE CULT: NORMAL
FLUAV SPEC QL CULT: NEGATIVE
FLUBV SPEC QL CULT: NEGATIVE
HADV SPEC QL CULT: NEGATIVE
HPIV1 SPEC QL CULT: NEGATIVE
HPIV2 SPEC QL CULT: NEGATIVE
HPIV3 SPEC QL CULT: NEGATIVE
RSV SPEC QL CULT: NEGATIVE

## 2024-03-25 ENCOUNTER — OFFICE VISIT (OUTPATIENT)
Dept: INTERNAL MEDICINE | Facility: CLINIC | Age: 74
End: 2024-03-25
Payer: MEDICARE

## 2024-03-25 VITALS
DIASTOLIC BLOOD PRESSURE: 70 MMHG | TEMPERATURE: 98 F | OXYGEN SATURATION: 99 % | HEIGHT: 63 IN | BODY MASS INDEX: 44.47 KG/M2 | HEART RATE: 51 BPM | WEIGHT: 251 LBS | RESPIRATION RATE: 16 BRPM | SYSTOLIC BLOOD PRESSURE: 104 MMHG

## 2024-03-25 DIAGNOSIS — Z09 HOSPITAL DISCHARGE FOLLOW-UP: Primary | ICD-10-CM

## 2024-03-25 NOTE — PROGRESS NOTES
Transitional Care Follow Up Visit  Subjective     Sarah Ericka Littlejohn is a 74 y.o. female who presents for a transitional care management visit.    Within 48 business hours after discharge our office contacted her via telephone to coordinate her care and needs.  I reviewed the notes from this call which occurred on 3-.     I reviewed and discussed the details of that call along with the discharge summary, hospital problems, inpatient lab results, inpatient diagnostic studies, and consultation reports with Sarah.     Current outpatient and discharge medications have been reconciled for the patient.  Reviewed by: ANY Tristan          3/18/2024     4:21 PM   Date of TCM Phone Call   [facility]   Date of Admission 3/17/2024   Date of Discharge 3/18/2024   Discharge Disposition Home or Self Care     Risk for Readmission (LACE) Score: 6 (3/18/2024  5:00 AM)      History of Present Illness   Says she is overall is doing better. Was discharged on zyrtec which has helped. Woke up with sore throat this morning and increased drainage. Denies fever or chills but is still fatigued so she has not been doing as much around the house. Has shortness of breath with moderate activity which she has had for the last month. Seems to be slowly improving.     Course During Hospital Stay:    Initially presented to the emergency department for evaluation of shortness of breath and cough.  Had had symptoms for a week in had failed outpatient antibiotics and steroids.  She was given one-time dose of IV antibiotics and steroid and much improved.  Was discharged the following day.  Did test positive for human rhinovirus.  Labs overall unremarkable.  CTA of the chest no acute findings.     The following portions of the patient's history were reviewed and updated as appropriate: allergies, current medications, past family history, past medical history, past social history, past surgical history, and  problem list.  XR CHEST 1 VW- 3/17/2024 3:04 PM     HISTORY: soa       COMPARISON: Chest x-ray dated 3/12/2024     FINDINGS:  Upright frontal radiograph of the chest was obtained     New right infrahilar airspace disease with obscuration of the right  hemidiaphragm. The left lung is clear and well expanded. No pleural  effusion or pneumothorax. The cardiomediastinal silhouette and pulmonary  vascularity are within normal limits. The osseous structures and  surrounding soft tissues demonstrate no acute abnormality.     IMPRESSION:  1.  New right infrahilar airspace disease concerning for a developing  pneumonia in the right lung base. Lungs are otherwise clear. No  parapneumonic effusion.       CTA chest  IMPRESSION:  1.  No evidence of pulmonary embolus.  2.  Bilateral low lung volumes. There are bilateral groundglass  opacities which I suspect is largely related to the under expansion/low  lung volumes. A viral pneumonitis could have a similar appearance. There  is no consolidation to suggest bacterial pneumonia.       Review of Systems  Review of Systems - History obtained from the patient  General ROS: positive for  - fatigue  negative for - chills or fever  ENT ROS: positive for - nasal congestion and sore throat  negative for - epistaxis, sinus pain, tinnitus, or vertigo  Allergy and Immunology ROS: positive for - nasal congestion  negative for - hives  Respiratory ROS: no cough, no wheezing  Positive for shortness of breath with exertion  Cardiovascular ROS: no chest pain or dyspnea on exertion  Gastrointestinal ROS: no abdominal pain, change in bowel habits, or black or bloody stools    Objective     Vitals:    03/25/24 1404   BP: 104/70   Pulse: 51   Resp: 16   Temp: 98 °F (36.7 °C)   SpO2: 99%           Physical Exam  Vitals reviewed.   Constitutional:       General: She is not in acute distress.     Appearance: Normal appearance. She is not ill-appearing.   HENT:      Right Ear: Tympanic membrane, ear  canal and external ear normal.      Left Ear: Tympanic membrane, ear canal and external ear normal.      Nose: Nose normal.      Mouth/Throat:      Mouth: Mucous membranes are moist.      Pharynx: No oropharyngeal exudate or posterior oropharyngeal erythema.   Eyes:      Conjunctiva/sclera: Conjunctivae normal.   Cardiovascular:      Rate and Rhythm: Normal rate and regular rhythm.      Pulses: Normal pulses.      Heart sounds: Normal heart sounds. No murmur heard.     No friction rub. No gallop.   Pulmonary:      Effort: Pulmonary effort is normal. No respiratory distress.      Breath sounds: Normal breath sounds. No wheezing.   Musculoskeletal:         General: Normal range of motion.      Cervical back: Normal range of motion and neck supple.   Skin:     General: Skin is warm and dry.      Capillary Refill: Capillary refill takes less than 2 seconds.   Neurological:      General: No focal deficit present.      Mental Status: She is alert and oriented to person, place, and time.   Psychiatric:         Mood and Affect: Mood normal.         Behavior: Behavior normal.         Thought Content: Thought content normal.         Judgment: Judgment normal.         Assessment & Plan   Diagnoses and all orders for this visit:    1. Hospital discharge follow-up (Primary)      Discussed often viral symptoms persist for 4 to 6 weeks   Continue zyrtec daily and flonase   Rest, push fluids  Return for no improvement or worsening   Ericka verbalized understanding and agreement with the plan of care.

## 2024-03-26 ENCOUNTER — TELEPHONE (OUTPATIENT)
Dept: HEMATOLOGY | Age: 74
End: 2024-03-26

## 2024-03-28 NOTE — PROGRESS NOTES
Progress Note      Pt Name: Nathaly Sheppard  YOB: 1950  MRN: [id number]    Date of evaluation: 3/29/2024  History Obtained From:  patient, electronic medical record    CHIEF COMPLAINT:    Chief Complaint   Patient presents with    Follow-up     MGUS (monoclonal gammopathy of unknown significance)           Current active problems  IgM MGUS    HISTORY OF PRESENT ILLNESS:    Nathaly Sheppard is a 74 y.o.  female with IgM MGUS followed in the office since 12/15/2016.  She continues to be followed with observation only.  She had been doing well until earlier this month she was hospitalized at [facility] with pneumonia both rhinovirus and bacterial pneumonia.  She is slowly recovering from that illness.      She continues on escitalopram 10 mg daily for anxiety which is stable.  She continues taking allopurinol 100 mg daily for hyperuricemia.  She does take melatonin for her sleep at night.  Blood pressure is stable on her lisinopril 20 daily.    HEMATOLOGY HISTORY: IgM-MGUS 1/23/2017  Karla was seen in initial hematology consultation on 12/15/2016 referred by Dr. Espinoza for an abnormal SPEP.     SEROLOGY 7/21/2016  SPEP - M spike of 0.4 g/dL   QI - IgM of 331 ()    SEROLOGY 11/7/2016  SPEP - M spike of 0.6 g/dL    Her initial CBC in the office reveals a WBC of 6.05, Hgb 13.1 with .4 and PLT of 196,000.     BASELINE SEROLOGY 12/15/2016  B12 - 701   Folate - 12   QI - IgM 285 () with normal IgA and IgG   Free serum light chains - kappa 77.62, lambda 33.92, K/L ratio 2.29   CMP - creatinine 1.2 with GFR 47.9 otherwise normal    BASELINE URINE STUDIES:  24-hour urine for immunoelectrophoresis and immunofixation returned on 12/19/2016 revealed no monoclonal protein    BONE SURVEY AT [facility] ON 12/15/2016 was negative for any lytic lesions    Bone marrow aspiration and biopsy performed in [facility] on 1/23/2017   Normocellular (40-50%) with trilineage hematopoiesis and no evidence of

## 2024-03-29 ENCOUNTER — HOSPITAL ENCOUNTER (OUTPATIENT)
Dept: INFUSION THERAPY | Age: 74
Discharge: HOME | End: 2024-03-29
Payer: MEDICARE

## 2024-03-29 ENCOUNTER — OFFICE VISIT (OUTPATIENT)
Dept: HEMATOLOGY | Age: 74
End: 2024-03-29
Payer: MEDICARE

## 2024-03-29 VITALS
HEART RATE: 71 BPM | WEIGHT: 253.8 LBS | DIASTOLIC BLOOD PRESSURE: 60 MMHG | BODY MASS INDEX: 44.97 KG/M2 | TEMPERATURE: 98.2 F | OXYGEN SATURATION: 99 % | HEIGHT: 63 IN | SYSTOLIC BLOOD PRESSURE: 110 MMHG

## 2024-03-29 DIAGNOSIS — D47.2 MGUS (MONOCLONAL GAMMOPATHY OF UNKNOWN SIGNIFICANCE): ICD-10-CM

## 2024-03-29 DIAGNOSIS — D47.2 MGUS (MONOCLONAL GAMMOPATHY OF UNKNOWN SIGNIFICANCE): Primary | ICD-10-CM

## 2024-03-29 DIAGNOSIS — D69.6 THROMBOCYTOPENIA (HCC): ICD-10-CM

## 2024-03-29 DIAGNOSIS — D72.10 EOSINOPHILIA, UNSPECIFIED TYPE: ICD-10-CM

## 2024-03-29 LAB
BASOPHILS # BLD: 0.02 K/UL (ref 0.01–0.08)
BASOPHILS NFR BLD: 0.2 % (ref 0.1–1.2)
EOSINOPHIL # BLD: 0.57 K/UL (ref 0.04–0.54)
EOSINOPHIL NFR BLD: 6.6 % (ref 0.7–7)
ERYTHROCYTE [DISTWIDTH] IN BLOOD BY AUTOMATED COUNT: 12.1 % (ref 11.7–14.4)
HCT VFR BLD AUTO: 35.7 % (ref 34.1–44.9)
HGB BLD-MCNC: 11.5 G/DL (ref 11.2–15.7)
LYMPHOCYTES # BLD: 2.27 K/UL (ref 1.18–3.74)
LYMPHOCYTES NFR BLD: 26.1 % (ref 19.3–53.1)
MCH RBC QN AUTO: 31.8 PG (ref 25.6–32.2)
MCHC RBC AUTO-ENTMCNC: 32.2 G/DL (ref 32.3–35.5)
MCV RBC AUTO: 98.6 FL (ref 79.4–94.8)
MONOCYTES # BLD: 0.62 K/UL (ref 0.24–0.82)
MONOCYTES NFR BLD: 7.1 % (ref 4.7–12.5)
NEUTROPHILS # BLD: 5.19 K/UL (ref 1.56–6.13)
NEUTS SEG NFR BLD: 59.7 % (ref 34–71.1)
PLATELET # BLD AUTO: 231 K/UL (ref 182–369)
PMV BLD AUTO: 9.7 FL (ref 7.4–10.4)
RBC # BLD AUTO: 3.62 M/UL (ref 3.93–5.22)
WBC # BLD AUTO: 8.7 K/UL (ref 3.98–10.04)

## 2024-03-29 PROCEDURE — G8399 PT W/DXA RESULTS DOCUMENT: HCPCS | Performed by: PHYSICIAN ASSISTANT

## 2024-03-29 PROCEDURE — G8427 DOCREV CUR MEDS BY ELIG CLIN: HCPCS | Performed by: PHYSICIAN ASSISTANT

## 2024-03-29 PROCEDURE — 36415 COLL VENOUS BLD VENIPUNCTURE: CPT

## 2024-03-29 PROCEDURE — G8484 FLU IMMUNIZE NO ADMIN: HCPCS | Performed by: PHYSICIAN ASSISTANT

## 2024-03-29 PROCEDURE — G8417 CALC BMI ABV UP PARAM F/U: HCPCS | Performed by: PHYSICIAN ASSISTANT

## 2024-03-29 PROCEDURE — 1123F ACP DISCUSS/DSCN MKR DOCD: CPT | Performed by: PHYSICIAN ASSISTANT

## 2024-03-29 PROCEDURE — 3017F COLORECTAL CA SCREEN DOC REV: CPT | Performed by: PHYSICIAN ASSISTANT

## 2024-03-29 PROCEDURE — 99213 OFFICE O/P EST LOW 20 MIN: CPT | Performed by: PHYSICIAN ASSISTANT

## 2024-03-29 PROCEDURE — 85025 COMPLETE CBC W/AUTO DIFF WBC: CPT

## 2024-03-29 PROCEDURE — 99212 OFFICE O/P EST SF 10 MIN: CPT

## 2024-03-29 PROCEDURE — 1036F TOBACCO NON-USER: CPT | Performed by: PHYSICIAN ASSISTANT

## 2024-03-29 PROCEDURE — 1090F PRES/ABSN URINE INCON ASSESS: CPT | Performed by: PHYSICIAN ASSISTANT

## 2024-03-29 RX ORDER — CETIRIZINE HYDROCHLORIDE 10 MG/1
10 TABLET ORAL DAILY
COMMUNITY

## 2024-03-29 ASSESSMENT — ENCOUNTER SYMPTOMS
EYE DISCHARGE: 0
EYE ITCHING: 0
VOICE CHANGE: 0
TROUBLE SWALLOWING: 0
DIARRHEA: 0
ABDOMINAL PAIN: 0
BLOOD IN STOOL: 0
SHORTNESS OF BREATH: 1
NAUSEA: 0
PHOTOPHOBIA: 0
ABDOMINAL DISTENTION: 0
CONSTIPATION: 0
BACK PAIN: 0
VOMITING: 0
COUGH: 0
WHEEZING: 0
COLOR CHANGE: 0
SORE THROAT: 0

## 2024-03-31 LAB
ALBUMIN SERPL-MCNC: 3.37 G/DL (ref 3.75–5.01)
ALPHA1 GLOB SERPL ELPH-MCNC: 0.33 G/DL (ref 0.19–0.46)
ALPHA2 GLOB SERPL ELPH-MCNC: 0.89 G/DL (ref 0.48–1.05)
B-GLOBULIN SERPL ELPH-MCNC: 0.89 G/DL (ref 0.48–1.1)
DEPRECATED KAPPA LC FREE/LAMBDA SER: 3.42 {RATIO} (ref 0.26–1.65)
EER MONOCLONAL PROTEIN AND FLC, SERUM: ABNORMAL
GAMMA GLOB SERPL ELPH-MCNC: 1.22 G/DL (ref 0.62–1.51)
IGA SERPL-MCNC: 310 MG/DL (ref 68–408)
IGG SERPL-MCNC: 1039 MG/DL (ref 768–1632)
IGM SERPL-MCNC: 449 MG/DL (ref 35–263)
INTERPRETATION SERPL IFE-IMP: ABNORMAL
INTERPRETATION SERPL IFE-IMP: ABNORMAL
KAPPA LC FREE SER-MCNC: 153.5 MG/L (ref 3.3–19.4)
LAMBDA LC FREE SERPL-MCNC: 44.88 MG/L (ref 5.71–26.3)
MONOCLONAL PROTEIN, SERUM: 0.32 G/DL
PROT SERPL-MCNC: 6.7 G/DL (ref 6.3–8.2)

## 2024-06-03 RX ORDER — ALLOPURINOL 100 MG/1
100 TABLET ORAL DAILY
Qty: 90 TABLET | Refills: 1 | Status: SHIPPED | OUTPATIENT
Start: 2024-06-03

## 2024-06-19 DIAGNOSIS — F33.42 RECURRENT MAJOR DEPRESSIVE DISORDER, IN FULL REMISSION: ICD-10-CM

## 2024-06-19 DIAGNOSIS — F33.41 RECURRENT MAJOR DEPRESSIVE DISORDER, IN PARTIAL REMISSION: ICD-10-CM

## 2024-06-19 RX ORDER — ESCITALOPRAM OXALATE 10 MG/1
20 TABLET ORAL DAILY
Qty: 90 TABLET | Refills: 3 | OUTPATIENT
Start: 2024-06-19

## 2024-06-19 NOTE — TELEPHONE ENCOUNTER
Rx Refill Note  Requested Prescriptions     Pending Prescriptions Disp Refills    escitalopram (LEXAPRO) 10 MG tablet [Pharmacy Med Name: ESCITALOPRAM 10 MG TABLET] 90 tablet 3     Sig: TAKE 2 TABLETS BY MOUTH EVERY DAY      Last office visit with prescribing clinician: 3/25/2024   Last telemedicine visit with prescribing clinician: Visit date not found   Next office visit with prescribing clinician: 7/26/2024                         Would you like a call back once the refill request has been completed: [] Yes [] No    If the office needs to give you a call back, can they leave a voicemail: [] Yes [] No    Luis A Cleary MA  06/19/24, 09:00 CDT

## 2024-06-20 RX ORDER — ESCITALOPRAM OXALATE 20 MG/1
20 TABLET ORAL DAILY
Qty: 90 TABLET | Refills: 3 | OUTPATIENT
Start: 2024-06-20

## 2024-07-23 ENCOUNTER — HOSPITAL ENCOUNTER (OUTPATIENT)
Dept: BONE DENSITY | Facility: HOSPITAL | Age: 74
Discharge: HOME | End: 2024-07-23
Payer: MEDICARE

## 2024-07-23 ENCOUNTER — HOSPITAL ENCOUNTER (OUTPATIENT)
Dept: MAMMOGRAPHY | Facility: HOSPITAL | Age: 74
Discharge: HOME | End: 2024-07-23
Payer: MEDICARE

## 2024-07-23 DIAGNOSIS — Z78.0 ENCOUNTER FOR OSTEOPOROSIS SCREENING IN ASYMPTOMATIC POSTMENOPAUSAL PATIENT: ICD-10-CM

## 2024-07-23 DIAGNOSIS — Z13.820 ENCOUNTER FOR OSTEOPOROSIS SCREENING IN ASYMPTOMATIC POSTMENOPAUSAL PATIENT: ICD-10-CM

## 2024-07-23 DIAGNOSIS — Z12.31 ENCOUNTER FOR SCREENING MAMMOGRAM FOR MALIGNANT NEOPLASM OF BREAST: ICD-10-CM

## 2024-07-23 PROCEDURE — 77063 BREAST TOMOSYNTHESIS BI: CPT

## 2024-07-23 PROCEDURE — 77067 SCR MAMMO BI INCL CAD: CPT

## 2024-07-23 PROCEDURE — 77080 DXA BONE DENSITY AXIAL: CPT

## 2024-07-24 NOTE — PROGRESS NOTES
"Chief Complaint  Follow-up (No problems reported)    Subjective        Sarah Littlejohn is a 74 y.o. female who presents today for evaluation of the above problems.      History of Present Illness  History of Present Illness  The patient presents for six month general medical follow-up.    The patient reports no adverse effects from her current medication regimen. Her depressive symptoms are well-managed with Lexapro, and she is seeking a 90-day refill of this medication. She denies the need for additional medication refills. She reports no alterations in respiratory function or chest discomfort. She has not observed any home-related issues. She is under the care of a nephrologist and is scheduled for a follow-up on the upcoming Monday. She suspects the onset of ankle edema, which typically resolves upon elevation. Additionally, she reports hip pain, which initiated a few weeks ago. A bone density scan was conducted, and she was advised to increase her physical activity as it showed osteopenia. She denies any recent falls or injuries.    Review of Systems - History obtained from the patient  General ROS: negative  Respiratory ROS: no cough, shortness of breath, or wheezing  Cardiovascular ROS: no chest pain or dyspnea on exertion  Gastrointestinal ROS: no abdominal pain, change in bowel habits, or black or bloody stools  Neurological ROS: no TIA or stroke symptoms    Objective   Vital Signs:  /79 (BP Location: Left arm, Patient Position: Sitting, Cuff Size: Other (Comment))   Pulse 59   Temp 97.5 °F (36.4 °C) (Temporal)   Resp 16   Ht 160 cm (63\")   Wt 118 kg (261 lb)   SpO2 96%   BMI 46.23 kg/m²   Estimated body mass index is 46.23 kg/m² as calculated from the following:    Height as of this encounter: 160 cm (63\").    Weight as of this encounter: 118 kg (261 lb).           Physical Exam  Vitals reviewed.   Constitutional:       General: She is not in acute distress.     Appearance: Normal " appearance. She is not ill-appearing.   Neck:      Thyroid: No thyroid mass, thyromegaly or thyroid tenderness.      Trachea: Trachea and phonation normal.   Cardiovascular:      Rate and Rhythm: Normal rate and regular rhythm.      Pulses: Normal pulses.      Heart sounds: Normal heart sounds. No murmur heard.     No friction rub. No gallop.   Pulmonary:      Effort: Pulmonary effort is normal. No respiratory distress.      Breath sounds: Normal breath sounds. No wheezing.   Musculoskeletal:      Cervical back: Normal range of motion and neck supple.      Right lower leg: No edema.      Left lower leg: No edema.   Lymphadenopathy:      Cervical: No cervical adenopathy.   Skin:     General: Skin is warm and dry.      Capillary Refill: Capillary refill takes less than 2 seconds.   Neurological:      General: No focal deficit present.      Mental Status: She is alert and oriented to person, place, and time.   Psychiatric:         Mood and Affect: Mood normal.         Behavior: Behavior normal.         Thought Content: Thought content normal.         Judgment: Judgment normal.        Physical Exam      Result Review :  The following data was reviewed by: ANY Tristan on 07/26/2024:  CMP          3/17/2024    16:25   CMP   Glucose 120    BUN 54    Creatinine 1.37    EGFR 40.9    Sodium 139    Potassium 4.4    Chloride 104    Calcium 9.9    BUN/Creatinine Ratio 39.4    Anion Gap 13.0      CBC          3/17/2024    16:25 3/18/2024    05:02 3/29/2024    09:40   CBC   WBC 11.01  10.08  8.70       RBC 4.32  3.90  3.62       Hemoglobin 13.8  12.2  11.5       Hematocrit 41.1  37.4  35.7       MCV 95.1  95.9  98.6       MCH 31.9  31.3  31.8       MCHC 33.6  32.6  32.2       RDW 12.3  12.2  12.1       Platelets 155  226  231          Details          This result is from an external source.                     Results  Imaging  Bone density test shows osteopenia.           Assessment and Plan   Diagnoses and all orders  for this visit:    1. Primary hypertension (Primary)  Assessment & Plan:  Hypertension is stable and controlled  Continue current treatment regimen.  Blood pressure will be reassessed in 6 months  Meets blood pressure goal of less than 150/90 per JNC 8 guidelines.  Continue current medication regimen..      2. Stage 3b chronic kidney disease  Assessment & Plan:  Stable.  Continue to follow with nephrology.  Plan to reassess at next appointment in 6 months.      3. Recurrent major depressive disorder, in full remission  Assessment & Plan:  Patient's depression is a single episode that is mild without psychosis. Depression is in full remission and stable.    Plan:   Continue current medication therapy     Followup in 6 months  .     Orders:  -     escitalopram (LEXAPRO) 20 MG tablet; Take 1 tablet by mouth Daily.  Dispense: 90 tablet; Refill: 3    4. Localized edema  Comments:  If edema worsens or does not subside with elevation contact office for further eval.    5. Bilateral hip pain  Comments:  Declines additional imaging.  Wants to see if it will subside on its own.  If not she has been instructed to contact the office for further workup and eval.      I will be providing care over continum for this patient including management of both chronic and acute medical conditions     Assessment & Plan       I spent 30 minutes caring for Sarah on this date of service. This time includes time spent by me in the following activities:preparing for the visit, reviewing tests, obtaining and/or reviewing a separately obtained history, performing a medically appropriate examination and/or evaluation , counseling and educating the patient/family/caregiver, ordering medications, tests, or procedures, documenting information in the medical record, and independently interpreting results and communicating that information with the patient/family/caregiver  Follow Up   Return in about 6 months (around 1/26/2025) for Medicare Wellness  with Dr. Singleton .  Patient was given instructions and counseling regarding her condition or for health maintenance advice. Please see specific information pulled into the AVS if appropriate.       Patient or patient representative verbalized consent for the use of Ambient Listening during the visit with  ANY Tristan for chart documentation. 7/26/2024  11:04 CDT

## 2024-07-26 ENCOUNTER — OFFICE VISIT (OUTPATIENT)
Dept: INTERNAL MEDICINE | Facility: CLINIC | Age: 74
End: 2024-07-26
Payer: MEDICARE

## 2024-07-26 VITALS
BODY MASS INDEX: 46.25 KG/M2 | WEIGHT: 261 LBS | SYSTOLIC BLOOD PRESSURE: 144 MMHG | HEIGHT: 63 IN | TEMPERATURE: 97.5 F | OXYGEN SATURATION: 96 % | RESPIRATION RATE: 16 BRPM | DIASTOLIC BLOOD PRESSURE: 79 MMHG | HEART RATE: 59 BPM

## 2024-07-26 DIAGNOSIS — M25.551 BILATERAL HIP PAIN: ICD-10-CM

## 2024-07-26 DIAGNOSIS — F33.42 RECURRENT MAJOR DEPRESSIVE DISORDER, IN FULL REMISSION: ICD-10-CM

## 2024-07-26 DIAGNOSIS — N18.32 STAGE 3B CHRONIC KIDNEY DISEASE: ICD-10-CM

## 2024-07-26 DIAGNOSIS — I10 PRIMARY HYPERTENSION: Primary | ICD-10-CM

## 2024-07-26 DIAGNOSIS — M25.552 BILATERAL HIP PAIN: ICD-10-CM

## 2024-07-26 DIAGNOSIS — R60.0 LOCALIZED EDEMA: ICD-10-CM

## 2024-07-26 PROCEDURE — 3077F SYST BP >= 140 MM HG: CPT | Performed by: NURSE PRACTITIONER

## 2024-07-26 PROCEDURE — G2211 COMPLEX E/M VISIT ADD ON: HCPCS | Performed by: NURSE PRACTITIONER

## 2024-07-26 PROCEDURE — 99214 OFFICE O/P EST MOD 30 MIN: CPT | Performed by: NURSE PRACTITIONER

## 2024-07-26 PROCEDURE — 1126F AMNT PAIN NOTED NONE PRSNT: CPT | Performed by: NURSE PRACTITIONER

## 2024-07-26 PROCEDURE — 3078F DIAST BP <80 MM HG: CPT | Performed by: NURSE PRACTITIONER

## 2024-07-26 RX ORDER — ESCITALOPRAM OXALATE 20 MG/1
20 TABLET ORAL DAILY
Qty: 90 TABLET | Refills: 3 | Status: SHIPPED | OUTPATIENT
Start: 2024-07-26

## 2024-07-26 NOTE — ASSESSMENT & PLAN NOTE
Patient's depression is a single episode that is mild without psychosis. Depression is in full remission and stable.    Plan:   Continue current medication therapy     Followup in 6 months  .

## 2024-07-26 NOTE — ASSESSMENT & PLAN NOTE
Hypertension is stable and controlled  Continue current treatment regimen.  Blood pressure will be reassessed in 6 months  Meets blood pressure goal of less than 150/90 per JNC 8 guidelines.  Continue current medication regimen..

## 2024-09-06 RX ORDER — ALLOPURINOL 100 MG/1
100 TABLET ORAL DAILY
Qty: 90 TABLET | Refills: 1 | OUTPATIENT
Start: 2024-09-06

## 2024-09-06 NOTE — TELEPHONE ENCOUNTER
Rx Refill Note  Requested Prescriptions     Pending Prescriptions Disp Refills    allopurinol (ZYLOPRIM) 100 MG tablet [Pharmacy Med Name: ALLOPURINOL 100 MG TABLET] 90 tablet 1     Sig: TAKE 1 TABLET BY MOUTH EVERY DAY      Last office visit with prescribing clinician: 7/26/2024   Last telemedicine visit with prescribing clinician: Visit date not found   Next office visit with prescribing clinician: Visit date not found                         Would you like a call back once the refill request has been completed: [] Yes [] No    If the office needs to give you a call back, can they leave a voicemail: [] Yes [] No    Luis A Cleary MA  09/06/24, 08:18 CDT

## 2024-10-28 NOTE — PROGRESS NOTES
kappa  QI IgG 1364, IgA 252, IgM 503  Free serum light chains kappa 181.36, lambda 57, K/L ratio 3.18    CMP 8/25/2022 - crt 1.3 with GFR 40, Ca 9.4    Serology 2/10/2023  SPEP-M spike 0.41 g/dL IgM kappa  QI-IgG 1290, IgA 234. IgM 472  Free serum light chains- Kappa 197.48, Lambda 54.12, K/L 3.65  CMP-crt 1.6 with GFR 34, Ca 9.8    Serology 9/29/2023  SPEP-M spike 0.36 g/dl, IgM Kappa   QI-IgG 1321, IgA 304, IgM 512  Free serum light chains- Kappa 254.50, Lambda 66.37, K/L 3.83  CMP-crt 1.7 with GFR 32, Ca 10.0    AMOL+Protein Electrophoresis, 24 Hour Urine 10/4/2023  No monoclonal protein detected on UPEP.  Urine AMOL is negative for monoclonal free light chains (Bence Heart Protein). Total protein 90.    Serology 3/29/2024  SPEP-M spike 0.32 g/dl, IgM Kappa   QI-IgG 1039, IgA 310, IgM 449  Free serum light chains- Kappa 153.50, Lambda 44.88, K/L 3.42      C - ca 9.9 and normal on 3/17/2024 [facility] epic  R - crt 1.37 with GFR 40.9 on 3/17/2024 [facility] epic  A - HGB 11.5 today  B - no bone pain or lesions      Serum M spike, serum IgM, serum K/L ratio all stable.  Stage IIIb chronic renal failure has been stable.  No hypercalcemia.    No significant lymphadenopathy on previous CT imaging.  No palpable peripheral lymphadenopathy noted.    PLAN  Continue monitoring monoclonal protein serology, CMP      2.  Elevated LFTs -hepatic steatosis not at goal    Serology 9/29/2023  CMP-crt 1.7 with GFR 32, Ca 10.0. alk phos 113,       Serology 10/18/2023  GGT-30  Hepatitis panel-Non reactive  CMP-crt 1.8 with GFR 29, Ca 10.2, Alk phos 123, Alt 40, AST 41         US Liver at [facility] on 10/18/2023. Comparison none  Pancreas, abdominal aorta, inferior vena cava, and spleen not visualized due to overlying bowel gas.  Status post cholecystectomy.  Fatty metamorphosis of the liver.  Benign right renal cysts.  Otherwise unremarkable right upper quadrant abdomen ultrasound.       Workup revealed elevated LFTs, hepatitis panel was

## 2024-10-29 ENCOUNTER — TELEPHONE (OUTPATIENT)
Dept: HEMATOLOGY | Age: 74
End: 2024-10-29

## 2024-10-29 NOTE — TELEPHONE ENCOUNTER
Called Patient and reminded patient of their appointment on 10/31/2024 and patient confirmed they would be here. Reminded patient to just come at appointment time, and to not come at the lab appointment time. Reminded patient that we will not check them in any more than 30 minutes before appointment time.  We have now moved to the [facility] cancer center that is located between our old office and the ER at the [facility]. Letting the Pt know that our front entrance faces the  [employer].

## 2024-10-31 ENCOUNTER — HOSPITAL ENCOUNTER (OUTPATIENT)
Dept: INFUSION THERAPY | Age: 74
Discharge: HOME | End: 2024-10-31
Payer: MEDICARE

## 2024-10-31 ENCOUNTER — OFFICE VISIT (OUTPATIENT)
Dept: HEMATOLOGY | Age: 74
End: 2024-10-31
Payer: MEDICARE

## 2024-10-31 VITALS
BODY MASS INDEX: 45.71 KG/M2 | WEIGHT: 258 LBS | TEMPERATURE: 97.8 F | OXYGEN SATURATION: 96 % | DIASTOLIC BLOOD PRESSURE: 88 MMHG | HEART RATE: 62 BPM | HEIGHT: 63 IN | SYSTOLIC BLOOD PRESSURE: 134 MMHG

## 2024-10-31 DIAGNOSIS — D72.10 EOSINOPHILIA, UNSPECIFIED TYPE: ICD-10-CM

## 2024-10-31 DIAGNOSIS — D69.6 THROMBOCYTOPENIA (HCC): ICD-10-CM

## 2024-10-31 DIAGNOSIS — D47.2 MGUS (MONOCLONAL GAMMOPATHY OF UNKNOWN SIGNIFICANCE): Primary | ICD-10-CM

## 2024-10-31 DIAGNOSIS — D47.2 MGUS (MONOCLONAL GAMMOPATHY OF UNKNOWN SIGNIFICANCE): ICD-10-CM

## 2024-10-31 LAB
ALBUMIN SERPL-MCNC: 3.7 G/DL (ref 3.5–5.2)
ALP SERPL-CCNC: 80 U/L (ref 35–104)
ALT SERPL-CCNC: 14 U/L (ref 5–33)
ANION GAP SERPL CALCULATED.3IONS-SCNC: 12 MMOL/L (ref 7–19)
AST SERPL-CCNC: 24 U/L (ref 5–32)
BASOPHILS # BLD: 0.02 K/UL (ref 0.01–0.08)
BASOPHILS NFR BLD: 0.3 % (ref 0.1–1.2)
BILIRUB SERPL-MCNC: 0.5 MG/DL (ref 0–1.2)
BUN SERPL-MCNC: 33 MG/DL (ref 8–23)
CALCIUM SERPL-MCNC: 9.5 MG/DL (ref 8.8–10.2)
CHLORIDE SERPL-SCNC: 105 MMOL/L (ref 98–107)
CO2 SERPL-SCNC: 25 MMOL/L (ref 22–29)
CREAT SERPL-MCNC: 1.7 MG/DL (ref 0.5–0.9)
EOSINOPHIL # BLD: 0.64 K/UL (ref 0.04–0.54)
EOSINOPHIL NFR BLD: 9.7 % (ref 0.7–7)
ERYTHROCYTE [DISTWIDTH] IN BLOOD BY AUTOMATED COUNT: 12.2 % (ref 11.7–14.4)
GLUCOSE SERPL-MCNC: 148 MG/DL (ref 70–99)
HCT VFR BLD AUTO: 35.9 % (ref 34.1–44.9)
HGB BLD-MCNC: 11.9 G/DL (ref 11.2–15.7)
LYMPHOCYTES # BLD: 1.98 K/UL (ref 1.18–3.74)
LYMPHOCYTES NFR BLD: 29.9 % (ref 19.3–53.1)
MCH RBC QN AUTO: 32 PG (ref 25.6–32.2)
MCHC RBC AUTO-ENTMCNC: 33.1 G/DL (ref 32.3–35.5)
MCV RBC AUTO: 96.5 FL (ref 79.4–94.8)
MONOCYTES # BLD: 0.6 K/UL (ref 0.24–0.82)
MONOCYTES NFR BLD: 9 % (ref 4.7–12.5)
NEUTROPHILS # BLD: 3.37 K/UL (ref 1.56–6.13)
NEUTS SEG NFR BLD: 50.8 % (ref 34–71.1)
PLATELET # BLD AUTO: 232 K/UL (ref 182–369)
PMV BLD AUTO: 9.8 FL (ref 7.4–10.4)
POTASSIUM SERPL-SCNC: 4.5 MMOL/L (ref 3.5–5.1)
PROT SERPL-MCNC: 7.3 G/DL (ref 6.4–8.3)
RBC # BLD AUTO: 3.72 M/UL (ref 3.93–5.22)
SODIUM SERPL-SCNC: 142 MMOL/L (ref 136–145)
WBC # BLD AUTO: 6.63 K/UL (ref 3.98–10.04)

## 2024-10-31 PROCEDURE — 99212 OFFICE O/P EST SF 10 MIN: CPT

## 2024-10-31 PROCEDURE — 85025 COMPLETE CBC W/AUTO DIFF WBC: CPT

## 2024-10-31 PROCEDURE — 80053 COMPREHEN METABOLIC PANEL: CPT

## 2024-10-31 PROCEDURE — 36415 COLL VENOUS BLD VENIPUNCTURE: CPT

## 2024-10-31 ASSESSMENT — ENCOUNTER SYMPTOMS
VOMITING: 0
CONSTIPATION: 0
TROUBLE SWALLOWING: 0
ABDOMINAL DISTENTION: 0
BACK PAIN: 0
SORE THROAT: 0
SHORTNESS OF BREATH: 0
EYE DISCHARGE: 0
COUGH: 0
ABDOMINAL PAIN: 0
EYE ITCHING: 0
DIARRHEA: 0
COLOR CHANGE: 0
VOICE CHANGE: 0
NAUSEA: 0
BLOOD IN STOOL: 0
WHEEZING: 0
PHOTOPHOBIA: 0

## 2024-11-03 LAB
ALBUMIN SERPL-MCNC: 3.66 G/DL (ref 3.75–5.01)
ALPHA1 GLOB SERPL ELPH-MCNC: 0.32 G/DL (ref 0.19–0.46)
ALPHA2 GLOB SERPL ELPH-MCNC: 0.78 G/DL (ref 0.48–1.05)
B-GLOBULIN SERPL ELPH-MCNC: 0.88 G/DL (ref 0.48–1.1)
DEPRECATED KAPPA LC FREE/LAMBDA SER: 3.94 {RATIO} (ref 0.26–1.65)
EER MONOCLONAL PROTEIN AND FLC, SERUM: ABNORMAL
GAMMA GLOB SERPL ELPH-MCNC: 1.46 G/DL (ref 0.62–1.51)
IGA SERPL-MCNC: 268 MG/DL (ref 68–408)
IGG SERPL-MCNC: 1286 MG/DL (ref 768–1632)
IGM SERPL-MCNC: 461 MG/DL (ref 35–263)
INTERPRETATION SERPL IFE-IMP: ABNORMAL
INTERPRETATION SERPL IFE-IMP: ABNORMAL
KAPPA LC FREE SER-MCNC: 219.68 MG/L (ref 3.3–19.4)
LAMBDA LC FREE SERPL-MCNC: 55.72 MG/L (ref 5.71–26.3)
MONOCLONAL PROTEIN, SERUM: ABNORMAL G/DL
PROT SERPL-MCNC: 7.1 G/DL (ref 6.3–8.2)

## 2025-01-28 ENCOUNTER — OFFICE VISIT (OUTPATIENT)
Dept: INTERNAL MEDICINE | Facility: CLINIC | Age: 75
End: 2025-01-28
Payer: MEDICARE

## 2025-01-28 VITALS
HEART RATE: 54 BPM | OXYGEN SATURATION: 99 % | BODY MASS INDEX: 47.02 KG/M2 | WEIGHT: 265.4 LBS | DIASTOLIC BLOOD PRESSURE: 81 MMHG | RESPIRATION RATE: 16 BRPM | SYSTOLIC BLOOD PRESSURE: 146 MMHG | HEIGHT: 63 IN

## 2025-01-28 DIAGNOSIS — E79.0 ASYMPTOMATIC HYPERURICEMIA: ICD-10-CM

## 2025-01-28 DIAGNOSIS — I10 PRIMARY HYPERTENSION: ICD-10-CM

## 2025-01-28 DIAGNOSIS — E66.01 CLASS 3 SEVERE OBESITY DUE TO EXCESS CALORIES WITH SERIOUS COMORBIDITY AND BODY MASS INDEX (BMI) OF 45.0 TO 49.9 IN ADULT: ICD-10-CM

## 2025-01-28 DIAGNOSIS — R73.02 IMPAIRED GLUCOSE TOLERANCE: ICD-10-CM

## 2025-01-28 DIAGNOSIS — E66.813 CLASS 3 SEVERE OBESITY DUE TO EXCESS CALORIES WITH SERIOUS COMORBIDITY AND BODY MASS INDEX (BMI) OF 45.0 TO 49.9 IN ADULT: ICD-10-CM

## 2025-01-28 DIAGNOSIS — N18.32 STAGE 3B CHRONIC KIDNEY DISEASE: Primary | ICD-10-CM

## 2025-01-28 DIAGNOSIS — Z23 NEED FOR VACCINATION: ICD-10-CM

## 2025-01-28 PROCEDURE — 3079F DIAST BP 80-89 MM HG: CPT | Performed by: INTERNAL MEDICINE

## 2025-01-28 PROCEDURE — G0439 PPPS, SUBSEQ VISIT: HCPCS | Performed by: INTERNAL MEDICINE

## 2025-01-28 PROCEDURE — G0008 ADMIN INFLUENZA VIRUS VAC: HCPCS | Performed by: INTERNAL MEDICINE

## 2025-01-28 PROCEDURE — 90662 IIV NO PRSV INCREASED AG IM: CPT | Performed by: INTERNAL MEDICINE

## 2025-01-28 PROCEDURE — 1126F AMNT PAIN NOTED NONE PRSNT: CPT | Performed by: INTERNAL MEDICINE

## 2025-01-28 PROCEDURE — 3077F SYST BP >= 140 MM HG: CPT | Performed by: INTERNAL MEDICINE

## 2025-01-28 PROCEDURE — 1170F FXNL STATUS ASSESSED: CPT | Performed by: INTERNAL MEDICINE

## 2025-01-28 PROCEDURE — 99214 OFFICE O/P EST MOD 30 MIN: CPT | Performed by: INTERNAL MEDICINE

## 2025-01-28 PROCEDURE — 1160F RVW MEDS BY RX/DR IN RCRD: CPT | Performed by: INTERNAL MEDICINE

## 2025-01-28 PROCEDURE — 1159F MED LIST DOCD IN RCRD: CPT | Performed by: INTERNAL MEDICINE

## 2025-01-28 PROCEDURE — G2211 COMPLEX E/M VISIT ADD ON: HCPCS | Performed by: INTERNAL MEDICINE

## 2025-01-28 RX ORDER — DAPAGLIFLOZIN 10 MG/1
10 TABLET, FILM COATED ORAL DAILY
Qty: 90 TABLET | Refills: 1 | Status: SHIPPED | OUTPATIENT
Start: 2025-01-28

## 2025-01-28 RX ORDER — LISINOPRIL AND HYDROCHLOROTHIAZIDE 12.5; 2 MG/1; MG/1
1 TABLET ORAL DAILY
Qty: 90 TABLET | Refills: 3 | Status: CANCELLED | OUTPATIENT
Start: 2025-01-28

## 2025-01-28 RX ORDER — LISINOPRIL AND HYDROCHLOROTHIAZIDE 12.5; 2 MG/1; MG/1
1 TABLET ORAL DAILY
Qty: 90 TABLET | Refills: 3 | Status: SHIPPED | OUTPATIENT
Start: 2025-01-28 | End: 2025-01-28 | Stop reason: DRUGHIGH

## 2025-01-28 RX ORDER — ALLOPURINOL 100 MG/1
100 TABLET ORAL DAILY
Qty: 90 TABLET | Refills: 1 | Status: SHIPPED | OUTPATIENT
Start: 2025-01-28

## 2025-01-28 RX ORDER — DOXYCYCLINE 100 MG/1
1 CAPSULE ORAL EVERY 24 HOURS
COMMUNITY
Start: 2025-01-24

## 2025-01-28 RX ORDER — LISINOPRIL AND HYDROCHLOROTHIAZIDE 20; 25 MG/1; MG/1
1 TABLET ORAL DAILY
Qty: 90 TABLET | Refills: 1 | Status: SHIPPED | OUTPATIENT
Start: 2025-01-28

## 2025-01-28 NOTE — PROGRESS NOTES
Subjective   The ABCs of the Annual Wellness Visit  Medicare Wellness Visit      Sarah Littlejohn is a 74 y.o. patient who presents for a Medicare Wellness Visit.    The following portions of the patient's history were reviewed and   updated as appropriate: allergies, current medications, past family history, past medical history, past social history, past surgical history, and problem list.    Compared to one year ago, the patient's physical   health is the same.  Compared to one year ago, the patient's mental   health is the same.    Patient has been erroneously marked as diabetic. Based on the available clinical information, she does not have diabetes and should therefore be excluded from diabetic health maintenance and quality measures for the remainder of the reporting period.    Recent Hospitalizations:  This patient has had a [facility] admission record on file within the last 365 days.  Current Medical Providers:  Patient Care Team:  Michael Singleton DO as PCP - General (Internal Medicine)  Dewayne Santiago MD as Consulting Physician (Nephrology)  Jose Zeng MD as Consulting Physician (Oncology)  Case, Marek Bravo MD as Consulting Physician (Dermatology)  Hannah Marvin MD as Consulting Physician (Ophthalmology)    Outpatient Medications Prior to Visit   Medication Sig Dispense Refill    Ascorbic Acid (VITAMIN C GUMMIE PO) Take 3 tablets by mouth Daily.      calcium carb-cholecalciferol (Calcium 600+D3) 600-10 MG-MCG tablet per tablet Take 1 tablet by mouth Daily.      cetirizine (zyrTEC) 10 MG tablet Take 1 tablet by mouth Daily. Obtain OTC      Cholecalciferol (VITAMIN D3 PO) Take 1 tablet by mouth Daily.      doxycycline (MONODOX) 100 MG capsule Take 1 capsule by mouth Daily.      escitalopram (LEXAPRO) 20 MG tablet Take 1 tablet by mouth Daily. 90 tablet 3    lisinopril-hydrochlorothiazide (PRINZIDE,ZESTORETIC) 20-12.5 MG per tablet Take 1 tablet by mouth  "Daily. 90 tablet 3    Magnesium 250 MG tablet Take 2 tablets by mouth Daily.      allopurinol (ZYLOPRIM) 100 MG tablet Take 1 tablet by mouth Daily. 90 tablet 1     No facility-administered medications prior to visit.     No opioid medication identified on active medication list. I have reviewed chart for other potential  high risk medication/s and harmful drug interactions in the elderly.      Aspirin is not on active medication list.  Aspirin use is not indicated based on review of current medical condition/s. Risk of harm outweighs potential benefits.  .    Patient Active Problem List   Diagnosis    Primary hypertension    Class 3 severe obesity with body mass index (BMI) of 45.0 to 49.9 in adult    Stage 3b chronic kidney disease    Recurrent major depressive disorder, in full remission    MGUS (monoclonal gammopathy of unknown significance)    Impaired glucose tolerance    Urge incontinence    Asymptomatic hyperuricemia    SOB (shortness of breath) on exertion    Pleural effusion on right    Rhinovirus infection     Advance Care Planning Advance Directive is on file.  ACP discussion was held with the patient during this visit. Patient has an advance directive in EMR which is still valid.             Objective   Vitals:    01/28/25 0929   BP: 146/81   BP Location: Left arm   Patient Position: Sitting   Cuff Size: Adult   Pulse: 54   Resp: 16   SpO2: 99%   Weight: 120 kg (265 lb 6.4 oz)   Height: 160 cm (63\")       Estimated body mass index is 47.01 kg/m² as calculated from the following:    Height as of this encounter: 160 cm (63\").    Weight as of this encounter: 120 kg (265 lb 6.4 oz).                Does the patient have evidence of cognitive impairment? No                                                                                                Health  Risk Assessment    Smoking Status:  Social History     Tobacco Use   Smoking Status Never    Passive exposure: Never   Smokeless Tobacco Never "     Alcohol Consumption:  Social History     Substance and Sexual Activity   Alcohol Use No       Fall Risk Screen  REMEDIOSADI Fall Risk Assessment was completed, and patient is at LOW risk for falls.Assessment completed on:2025    Depression Screening   Little interest or pleasure in doing things? Not at all   Feeling down, depressed, or hopeless? Not at all   PHQ-2 Total Score 0      Health Habits and Functional and Cognitive Screenin/28/2025     9:00 AM   Functional & Cognitive Status   Do you have difficulty preparing food and eating? No   Do you have difficulty bathing yourself, getting dressed or grooming yourself? No   Do you have difficulty using the toilet? No   Do you have difficulty moving around from place to place? No   Do you have trouble with steps or getting out of a bed or a chair? No   Current Diet Unhealthy Diet   Dental Exam Up to date   Eye Exam Up to date   Exercise (times per week) 0 times per week   Current Exercises Include No Regular Exercise   Do you need help using the phone?  No   Are you deaf or do you have serious difficulty hearing?  No   Do you need help to go to places out of walking distance? No   Do you need help shopping? No   Do you need help preparing meals?  No   Do you need help with housework?  No   Do you need help with laundry? No   Do you need help taking your medications? No   Do you need help managing money? No   Do you ever drive or ride in a car without wearing a seat belt? No   Have you felt unusual stress, anger or loneliness in the last month? No   Who do you live with? Spouse   If you need help, do you have trouble finding someone available to you? No   Have you been bothered in the last four weeks by sexual problems? No   Do you have difficulty concentrating, remembering or making decisions? No           Age-appropriate Screening Schedule:  Refer to the list below for future screening recommendations based on patient's age, sex and/or medical  conditions. Orders for these recommended tests are listed in the plan section. The patient has been provided with a written plan.    Health Maintenance List  Health Maintenance   Topic Date Due    TDAP/TD VACCINES (1 - Tdap) Never done    ZOSTER VACCINE (1 of 2) Never done    INFLUENZA VACCINE  07/01/2024    COVID-19 Vaccine (5 - 2024-25 season) 09/01/2024    ANNUAL WELLNESS VISIT  12/19/2024    BMI FOLLOWUP  03/17/2025    COLORECTAL CANCER SCREENING  06/08/2026    MAMMOGRAM  07/23/2026    DXA SCAN  07/23/2026    HEPATITIS C SCREENING  Completed    Pneumococcal Vaccine 65+  Completed    PAP SMEAR  Discontinued    HEMOGLOBIN A1C  Discontinued                                                                                                                                                CMS Preventative Services Quick Reference  Risk Factors Identified During Encounter  Fall Risk-High or Moderate: Discussed Fall Prevention in the home  Immunizations Discussed/Encouraged: Tdap, Influenza, Shingrix, and COVID19  Dental Screening Recommended  Vision Screening Recommended    The above risks/problems have been discussed with the patient.  Pertinent information has been shared with the patient in the After Visit Summary.  An After Visit Summary and PPPS were made available to the patient.    Follow Up:   Next Medicare Wellness visit to be scheduled in 1 year.         Additional E&M Note during same encounter follows:  Patient has additional, significant, and separately identifiable condition(s)/problem(s) that require work above and beyond the Medicare Wellness Visit     Chief Complaint  No chief complaint on file.    Santa Barnett is also being seen today for additional medical problem/s.       The patient presents for a Medicare wellness examination.    She reports no significant health concerns, with her physical health status remaining stable compared to the previous year. She denies any falls within the past  "year and has maintained regular dental and ophthalmological evaluations. There have been no recent hospitalizations. The patient reports intact memory function and denies the presence of lower extremity edema. She has experienced weight gain attributed to non-adherence to her dietary regimen.    During today's visit, her blood pressure was noted to be slightly elevated; however, it was recorded as 120/62 at Dr. Santiago's office last Friday. She is currently prescribed antihypertensive medication, which is nearing depletion. She notes no changes to urinary quality nor quantity  Review of Systems   Respiratory:  Negative for shortness of breath.    Cardiovascular:  Negative for chest pain.          Objective   Vital Signs:  /81 (BP Location: Left arm, Patient Position: Sitting, Cuff Size: Adult)   Pulse 54   Resp 16   Ht 160 cm (63\")   Wt 120 kg (265 lb 6.4 oz)   SpO2 99%   BMI 47.01 kg/m²   Physical Exam  Constitutional:       General: She is not in acute distress.  Pulmonary:      Effort: Pulmonary effort is normal. No respiratory distress.   Neurological:      Mental Status: She is alert and oriented to person, place, and time.               Results                Assessment and Plan   Diagnoses and all orders for this visit:    1. Stage 3b chronic kidney disease (Primary)  -     dapagliflozin Propanediol (Farxiga) 10 MG tablet; Take 10 mg by mouth Daily.  Dispense: 90 tablet; Refill: 1  -     lisinopril-hydrochlorothiazide (Zestoretic) 20-25 MG per tablet; Take 1 tablet by mouth Daily.  Dispense: 90 tablet; Refill: 1  No obvious changes. Continue RAAS blockade and add Farxiga.     2. Primary hypertension  -     Discontinue: lisinopril-hydrochlorothiazide (PRINZIDE,ZESTORETIC) 20-12.5 MG per tablet; Take 1 tablet by mouth Daily.  Dispense: 90 tablet; Refill: 3  -     lisinopril-hydrochlorothiazide (Zestoretic) 20-25 MG per tablet; Take 1 tablet by mouth Daily.  Dispense: 90 tablet; Refill: 1  Poorly " controlled, BP goal for age is <140/90 per JNC 8 guidelines, and increase dose of medication.     3. Impaired glucose tolerance  Monitor diet and lifestyle. Farxiga as above.     4. Class 3 severe obesity due to excess calories with serious comorbidity and body mass index (BMI) of 45.0 to 49.9 in adult  Continue lifestyle modification.     5. Need for vaccination  -     Fluzone High-Dose 65+yrs (4283-5592)    6. Asymptomatic hyperuricemia  -     allopurinol (ZYLOPRIM) 100 MG tablet; Take 1 tablet by mouth Daily.  Dispense: 90 tablet; Refill: 1               2. Hypertension. Her blood pressure was slightly elevated today, but it was recorded as 120/62 at Dr. Martinez's office last Friday.  - Recheck blood pressure today.  - Consider adjustment to antihypertensive medication if blood pressure remains elevated.  - Prescribe Farxiga to help protect kidneys, control blood sugar, and potentially aid in weight loss. Provide samples to cover the initial 1 to 2 weeks of treatment while insurance details are being finalized.    3. Prediabetes. She is in the prediabetes range.  - Prescribe Farxiga to help control blood sugar levels and prevent progression to diabetes. Discuss potential benefits including kidney protection, blood pressure control, and weight loss.    CKD:  -Add Farxiga.    Follow-up  - Follow up in 6 months.            Follow Up   No follow-ups on file.  Patient was given instructions and counseling regarding her condition or for health maintenance advice. Please see specific information pulled into the AVS if appropriate.  Patient or patient representative verbalized consent for the use of Ambient Listening during the visit with  Michael Singleton DO for chart documentation. 1/28/2025  10:18 CST

## 2025-01-28 NOTE — PATIENT INSTRUCTIONS
Medicare Wellness  Personal Prevention Plan of Service     Date of Office Visit:    Encounter Provider:  Michael Singleton DO  Place of Service:  [facility] INTERNAL MEDICINE  Patient Name: Sarah Littlejohn  :  1950    As part of the Medicare Wellness portion of your visit today, we are providing you with this personalized preventive plan of services (PPPS). This plan is based upon recommendations of the [place] Preventive Services Task Force (USPSTF) and the Advisory Committee on Immunization Practices (ACIP).    This lists the preventive care services that should be considered, and provides dates of when you are due. Items listed as completed are up-to-date and do not require any further intervention.    Health Maintenance   Topic Date Due    TDAP/TD VACCINES (1 - Tdap) Never done    ZOSTER VACCINE (1 of 2) Never done    INFLUENZA VACCINE  2024    COVID-19 Vaccine (5 - - season) 2024    ANNUAL WELLNESS VISIT  2024    BMI FOLLOWUP  2025    COLORECTAL CANCER SCREENING  2026    MAMMOGRAM  2026    DXA SCAN  2026    HEPATITIS C SCREENING  Completed    Pneumococcal Vaccine 65+  Completed    PAP SMEAR  Discontinued    HEMOGLOBIN A1C  Discontinued       Orders Placed This Encounter   Procedures    Fluzone High-Dose 65+yrs (3812-2042)       Return in about 6 months (around 2025) for Recheck.

## 2025-04-24 ENCOUNTER — TELEPHONE (OUTPATIENT)
Dept: HEMATOLOGY | Age: 75
End: 2025-04-24

## 2025-04-24 DIAGNOSIS — D69.6 THROMBOCYTOPENIA: ICD-10-CM

## 2025-04-24 DIAGNOSIS — D47.2 MGUS (MONOCLONAL GAMMOPATHY OF UNKNOWN SIGNIFICANCE): Primary | ICD-10-CM

## 2025-04-24 DIAGNOSIS — D72.10 EOSINOPHILIA, UNSPECIFIED TYPE: ICD-10-CM

## 2025-04-24 NOTE — PROGRESS NOTES
Progress Note      Pt Name: Nathaly Sheppard  YOB: 1950  MRN: [id number]    Date of evaluation: 4/30/2025  History Obtained From:  patient, electronic medical record    CHIEF COMPLAINT:    Chief Complaint   Patient presents with    Follow-up     MGUS (monoclonal gammopathy of unknown significance)  Patient has no new issues to discuss today        Current active problems  IgM MGUS    History of Present Illness  Nathaly Sheppard is a 75 y.o.  female with IgM MGUS followed in the office since 12/15/2016.  She continues to be followed with observation only.  She denies any new issues.  She denies any bone pain.  She denies any recent hospitalizations.    A known diagnosis of MGUS is present, with protein levels remaining stable. Blood counts show a white blood count of 7.36, a hemoglobin level of 12.2, and platelets at 221,000, all within normal ranges. Seasonal allergies are reported, managed with Zyrtec.     Current medications include escitalopram 10 mg daily, which effectively maintains mood stability, and allopurinol 100 mg daily for increased uric acid levels. Stage III kidney disease is managed by Dr. Espinoza, with kidney function remaining stable. The etiology is believed to be hypertension, and lisinopril 20 mg is taken daily. A single episode of dizziness upon rapid standing is attributed to lisinopril.    Regular self-breast examinations are conducted with no new findings reported. The mammogram is up to date and will be due again in 07/2025, typically arranged by Dr. Barnes.      HEMATOLOGY HISTORY: IgM-MGUS 1/23/2017  Karla was seen in initial hematology consultation on 12/15/2016 referred by Dr. Espinoza for an abnormal SPEP.     SEROLOGY 7/21/2016  SPEP - M spike of 0.4 g/dL   QI - IgM of 331 ()    SEROLOGY 11/7/2016  SPEP - M spike of 0.6 g/dL    Her initial CBC in the office reveals a WBC of 6.05, Hgb 13.1 with .4 and PLT of 196,000.     BASELINE SEROLOGY

## 2025-04-24 NOTE — TELEPHONE ENCOUNTER
Called Patient and reminded patient of their appointment on 04/30/2025 and patient confirmed they would be here. Reminded patient to just come at appointment time, and to not come at the lab appointment time. Reminded patient that we will not check them in any more than 30 minutes before appointment time.  We have now moved to the [facility] [place] that is located between our old office and the ER at the [facility]. Letting the Pt know that our front entrance faces the  [place]. Reminded pt to eat well and be well hydrated for their labs.

## 2025-04-30 ENCOUNTER — HOSPITAL ENCOUNTER (OUTPATIENT)
Dept: INFUSION THERAPY | Age: 75
Discharge: HOME | End: 2025-04-30
Payer: MEDICARE

## 2025-04-30 ENCOUNTER — OFFICE VISIT (OUTPATIENT)
Dept: HEMATOLOGY | Age: 75
End: 2025-04-30
Payer: MEDICARE

## 2025-04-30 VITALS
HEART RATE: 64 BPM | HEIGHT: 63 IN | BODY MASS INDEX: 46.64 KG/M2 | SYSTOLIC BLOOD PRESSURE: 138 MMHG | TEMPERATURE: 98.2 F | DIASTOLIC BLOOD PRESSURE: 82 MMHG | OXYGEN SATURATION: 94 % | WEIGHT: 263.2 LBS

## 2025-04-30 DIAGNOSIS — D69.6 THROMBOCYTOPENIA: ICD-10-CM

## 2025-04-30 DIAGNOSIS — D72.10 EOSINOPHILIA, UNSPECIFIED TYPE: ICD-10-CM

## 2025-04-30 DIAGNOSIS — D47.2 MGUS (MONOCLONAL GAMMOPATHY OF UNKNOWN SIGNIFICANCE): ICD-10-CM

## 2025-04-30 DIAGNOSIS — D47.2 MGUS (MONOCLONAL GAMMOPATHY OF UNKNOWN SIGNIFICANCE): Primary | ICD-10-CM

## 2025-04-30 LAB
ALBUMIN SERPL-MCNC: 3.9 G/DL (ref 3.5–5.2)
ALP SERPL-CCNC: 95 U/L (ref 35–104)
ALT SERPL-CCNC: 16 U/L (ref 5–33)
ANION GAP SERPL CALCULATED.3IONS-SCNC: 10 MMOL/L (ref 7–19)
AST SERPL-CCNC: 27 U/L (ref 5–32)
BASOPHILS # BLD: 0.03 K/UL (ref 0–0.2)
BASOPHILS NFR BLD: 0.4 % (ref 0–1)
BILIRUB SERPL-MCNC: 0.4 MG/DL (ref 0–1.2)
BUN SERPL-MCNC: 34 MG/DL (ref 8–23)
CALCIUM SERPL-MCNC: 9.2 MG/DL (ref 8.8–10.2)
CHLORIDE SERPL-SCNC: 104 MMOL/L (ref 98–107)
CO2 SERPL-SCNC: 25 MMOL/L (ref 22–29)
CREAT SERPL-MCNC: 1.3 MG/DL (ref 0.5–0.9)
EOSINOPHIL # BLD: 0.72 K/UL (ref 0–0.6)
EOSINOPHIL NFR BLD: 9.8 % (ref 0–5)
ERYTHROCYTE [DISTWIDTH] IN BLOOD BY AUTOMATED COUNT: 12.1 % (ref 11.5–14.5)
GLUCOSE SERPL-MCNC: 162 MG/DL (ref 70–99)
HCT VFR BLD AUTO: 36.2 % (ref 37–47)
HGB BLD-MCNC: 12.2 G/DL (ref 12–16)
LYMPHOCYTES # BLD: 1.88 K/UL (ref 1.1–4.5)
LYMPHOCYTES NFR BLD: 25.5 % (ref 20–40)
MCH RBC QN AUTO: 32.4 PG (ref 27–31)
MCHC RBC AUTO-ENTMCNC: 33.7 G/DL (ref 33–37)
MCV RBC AUTO: 96.3 FL (ref 81–99)
MONOCYTES # BLD: 0.73 K/UL (ref 0–0.9)
MONOCYTES NFR BLD: 9.9 % (ref 1–10)
NEUTROPHILS # BLD: 3.98 K/UL (ref 1.5–7.5)
NEUTS SEG NFR BLD: 54.1 % (ref 50–65)
PLATELET # BLD AUTO: 221 K/UL (ref 130–400)
PMV BLD AUTO: 9.6 FL (ref 9.4–12.3)
POTASSIUM SERPL-SCNC: 4.2 MMOL/L (ref 3.5–5.1)
PROT SERPL-MCNC: 7.4 G/DL (ref 6.4–8.3)
RBC # BLD AUTO: 3.76 M/UL (ref 4.2–5.4)
SODIUM SERPL-SCNC: 139 MMOL/L (ref 136–145)
WBC # BLD AUTO: 7.36 K/UL (ref 4.8–10.8)

## 2025-04-30 PROCEDURE — G8399 PT W/DXA RESULTS DOCUMENT: HCPCS | Performed by: PHYSICIAN ASSISTANT

## 2025-04-30 PROCEDURE — 83883 ASSAY NEPHELOMETRY NOT SPEC: CPT

## 2025-04-30 PROCEDURE — 1036F TOBACCO NON-USER: CPT | Performed by: PHYSICIAN ASSISTANT

## 2025-04-30 PROCEDURE — 85025 COMPLETE CBC W/AUTO DIFF WBC: CPT

## 2025-04-30 PROCEDURE — 84165 PROTEIN E-PHORESIS SERUM: CPT

## 2025-04-30 PROCEDURE — 1090F PRES/ABSN URINE INCON ASSESS: CPT | Performed by: PHYSICIAN ASSISTANT

## 2025-04-30 PROCEDURE — G8417 CALC BMI ABV UP PARAM F/U: HCPCS | Performed by: PHYSICIAN ASSISTANT

## 2025-04-30 PROCEDURE — 84155 ASSAY OF PROTEIN SERUM: CPT

## 2025-04-30 PROCEDURE — 83521 IG LIGHT CHAINS FREE EACH: CPT

## 2025-04-30 PROCEDURE — 1126F AMNT PAIN NOTED NONE PRSNT: CPT | Performed by: PHYSICIAN ASSISTANT

## 2025-04-30 PROCEDURE — 36415 COLL VENOUS BLD VENIPUNCTURE: CPT

## 2025-04-30 PROCEDURE — 1159F MED LIST DOCD IN RCRD: CPT | Performed by: PHYSICIAN ASSISTANT

## 2025-04-30 PROCEDURE — 80053 COMPREHEN METABOLIC PANEL: CPT

## 2025-04-30 PROCEDURE — 99213 OFFICE O/P EST LOW 20 MIN: CPT

## 2025-04-30 PROCEDURE — 99213 OFFICE O/P EST LOW 20 MIN: CPT | Performed by: PHYSICIAN ASSISTANT

## 2025-04-30 PROCEDURE — G8427 DOCREV CUR MEDS BY ELIG CLIN: HCPCS | Performed by: PHYSICIAN ASSISTANT

## 2025-04-30 PROCEDURE — 3017F COLORECTAL CA SCREEN DOC REV: CPT | Performed by: PHYSICIAN ASSISTANT

## 2025-04-30 PROCEDURE — 86334 IMMUNOFIX E-PHORESIS SERUM: CPT

## 2025-04-30 PROCEDURE — 1123F ACP DISCUSS/DSCN MKR DOCD: CPT | Performed by: PHYSICIAN ASSISTANT

## 2025-04-30 PROCEDURE — 82784 ASSAY IGA/IGD/IGG/IGM EACH: CPT

## 2025-04-30 ASSESSMENT — ENCOUNTER SYMPTOMS
VOMITING: 0
ABDOMINAL DISTENTION: 0
BLOOD IN STOOL: 0
BACK PAIN: 0
EYE DISCHARGE: 0
PHOTOPHOBIA: 0
VOICE CHANGE: 0
SORE THROAT: 0
TROUBLE SWALLOWING: 0
SHORTNESS OF BREATH: 0
COUGH: 0
WHEEZING: 0
COLOR CHANGE: 0
CONSTIPATION: 0
NAUSEA: 0
EYE ITCHING: 0
ABDOMINAL PAIN: 0
DIARRHEA: 0

## 2025-05-03 LAB
ALBUMIN SERPL-MCNC: 3.54 G/DL (ref 3.75–5.01)
ALPHA1 GLOB SERPL ELPH-MCNC: 0.33 G/DL (ref 0.19–0.46)
ALPHA2 GLOB SERPL ELPH-MCNC: 0.83 G/DL (ref 0.48–1.05)
B-GLOBULIN SERPL ELPH-MCNC: 0.93 G/DL (ref 0.48–1.1)
DEPRECATED KAPPA LC FREE/LAMBDA SER: 3.81 {RATIO} (ref 0.26–1.65)
EER MONOCLONAL PROTEIN AND FLC, SERUM: ABNORMAL
GAMMA GLOB SERPL ELPH-MCNC: 1.47 G/DL (ref 0.62–1.51)
IGA SERPL-MCNC: 282 MG/DL (ref 68–408)
IGG SERPL-MCNC: 1263 MG/DL (ref 768–1632)
IGM SERPL-MCNC: 525 MG/DL (ref 35–263)
INTERPRETATION SERPL IFE-IMP: ABNORMAL
INTERPRETATION SERPL IFE-IMP: ABNORMAL
KAPPA LC FREE SER-MCNC: 236.21 MG/L (ref 3.3–19.4)
LAMBDA LC FREE SERPL-MCNC: 61.97 MG/L (ref 5.71–26.3)
MONOCLONAL PROTEIN, SERUM: ABNORMAL G/DL
PROT SERPL-MCNC: 7.1 G/DL (ref 6.3–8.2)

## 2025-07-02 ENCOUNTER — OFFICE VISIT (OUTPATIENT)
Dept: INTERNAL MEDICINE | Facility: CLINIC | Age: 75
End: 2025-07-02
Payer: MEDICARE

## 2025-07-02 VITALS
BODY MASS INDEX: 45.54 KG/M2 | OXYGEN SATURATION: 90 % | WEIGHT: 257 LBS | SYSTOLIC BLOOD PRESSURE: 113 MMHG | TEMPERATURE: 98 F | HEART RATE: 73 BPM | DIASTOLIC BLOOD PRESSURE: 70 MMHG | HEIGHT: 63 IN

## 2025-07-02 DIAGNOSIS — R19.7 DIARRHEA, UNSPECIFIED TYPE: ICD-10-CM

## 2025-07-02 DIAGNOSIS — R11.2 NAUSEA AND VOMITING, UNSPECIFIED VOMITING TYPE: ICD-10-CM

## 2025-07-02 DIAGNOSIS — K52.9 GASTROENTERITIS: Primary | ICD-10-CM

## 2025-07-02 RX ORDER — ONDANSETRON 4 MG/1
4 TABLET, FILM COATED ORAL EVERY 4 HOURS PRN
Qty: 20 TABLET | Refills: 0 | Status: ON HOLD | OUTPATIENT
Start: 2025-07-02

## 2025-07-02 NOTE — PROGRESS NOTES
Baldomero Melendez MD  [facility]   Family Medicine  [address] [place] [address] [address].  [place] [id number]  Phone: [phone]  Fax: [phone]       Chief Complaint:  Chief Complaint   Patient presents with    Diarrhea    Vomiting        History:  Sarah Littlejohn is a 75 y.o. female that is an established patient. She  is here for the above problems.    HPI:  Patient presents complaining of loose stool for 4 days and some nausea with vomiting this morning.  She reports no fever no blood in her bowel movements and that she has had no exposure to others with similar issues.  She reports no abdominal pain but she does report decreased appetite.  She states that she has not felt dizzy when she stands or lightheaded.  She reported a mild cough present for over 1 week nonproductive no sore throat.  She reports no over-the-counter medications for any issues and not starting any new medications or having dietary changes or travel.          reports that she has never smoked. She has never been exposed to tobacco smoke. She has never used smokeless tobacco. She reports that she does not drink alcohol and does not use drugs.    Current Outpatient Medications   Medication Instructions    allopurinol (ZYLOPRIM) 100 mg, Oral, Daily    Ascorbic Acid (VITAMIN C GUMMIE PO) 3 tablets, Daily    calcium carb-cholecalciferol (Calcium 600+D3) 600-10 MG-MCG tablet per tablet 1 tablet, Daily    cetirizine (ZYRTEC) 10 mg, Oral, Daily, Obtain OTC    Cholecalciferol (VITAMIN D3 PO) 1 tablet, Daily    dapagliflozin Propanediol (FARXIGA) 10 mg, Oral, Daily    escitalopram (LEXAPRO) 20 mg, Oral, Daily    lisinopril-hydrochlorothiazide (Zestoretic) 20-25 MG per tablet 1 tablet, Oral, Daily    Magnesium 250 MG tablet 2 each, Daily    ondansetron (ZOFRAN) 4 mg, Oral, Every 4 Hours PRN       OBJECTIVE:  /70 (BP Location: Left arm, Patient Position: Sitting, Cuff Size: Large Adult)   Pulse 73   Temp 98 °F (36.7 °C) (Infrared)    "Ht 160 cm (63\")   Wt 117 kg (257 lb)   SpO2 90%   BMI 45.53 kg/m²    Physical Exam  Constitutional:       Appearance: Normal appearance.   HENT:      Head: Normocephalic and atraumatic.      Right Ear: Tympanic membrane and ear canal normal.      Left Ear: Tympanic membrane and ear canal normal.      Nose: Nose normal.      Mouth/Throat:      Mouth: Mucous membranes are moist.      Pharynx: Oropharynx is clear.   Eyes:      Pupils: Pupils are equal, round, and reactive to light.   Cardiovascular:      Rate and Rhythm: Normal rate.      Pulses: Normal pulses.      Heart sounds: No murmur heard.  Abdominal:      General: Abdomen is flat. There is no distension.      Palpations: Abdomen is soft. There is no mass.      Tenderness: There is no abdominal tenderness. There is no right CVA tenderness, left CVA tenderness, guarding or rebound.   Musculoskeletal:         General: Normal range of motion.      Cervical back: Normal range of motion.   Skin:     General: Skin is warm and dry.   Neurological:      General: No focal deficit present.      Mental Status: She is alert.   Psychiatric:         Mood and Affect: Mood normal.         Thought Content: Thought content normal.           Results Reviewed:              Glucose   Date Value Ref Range Status   04/30/2025 162 (H) 70 - 99 mg/dL Final     BUN   Date Value Ref Range Status   04/30/2025 34 (H) 8 - 23 mg/dL Final     Creatinine   Date Value Ref Range Status   04/30/2025 1.3 (H) 0.5 - 0.9 mg/dL Final     Sodium   Date Value Ref Range Status   04/30/2025 139 136 - 145 mmol/L Final     Potassium   Date Value Ref Range Status   04/30/2025 4.2 3.5 - 5.1 mmol/L Final     Chloride   Date Value Ref Range Status   04/30/2025 104 98 - 107 mmol/L Final     CO2   Date Value Ref Range Status   04/30/2025 25 22 - 29 mmol/L Final     Calcium   Date Value Ref Range Status   04/30/2025 9.2 8.8 - 10.2 mg/dL Final     ALT (SGPT)   Date Value Ref Range Status   04/30/2025 16 5 - 33 U/L " Final     AST (SGOT)   Date Value Ref Range Status   04/30/2025 27 5 - 32 U/L Final     WBC   Date Value Ref Range Status   04/30/2025 7.36 4.80 - 10.80 K/uL Final     Hematocrit   Date Value Ref Range Status   04/30/2025 36.2 (L) 37.0 - 47.0 % Final     Platelets   Date Value Ref Range Status   04/30/2025 221 130 - 400 K/uL Final     Total Cholesterol   Date Value Ref Range Status   06/14/2023 154 0 - 200 mg/dL Final     Triglycerides   Date Value Ref Range Status   06/14/2023 115 0 - 150 mg/dL Final     HDL Cholesterol   Date Value Ref Range Status   06/14/2023 46 40 - 60 mg/dL Final     LDL Cholesterol    Date Value Ref Range Status   06/14/2023 87 0 - 100 mg/dL Final     LDL/HDL Ratio   Date Value Ref Range Status   06/14/2023 1.85  Final     Hemoglobin A1C   Date Value Ref Range Status   06/14/2023 6.00 (H) 4.80 - 5.60 % Final       No orders to display      Procedure   Procedures       Assessment/Plan:     Diagnoses and all orders for this visit:    1. Gastroenteritis (Primary)    2. Diarrhea, unspecified type    3. Nausea and vomiting, unspecified vomiting type    Other orders  -     ondansetron (ZOFRAN) 4 MG tablet; Take 1 tablet by mouth Every 4 (Four) Hours As Needed for Nausea or Vomiting.  Dispense: 20 tablet; Refill: 0                  An After Visit Summary was printed and given to the patient at discharge.  Return if symptoms worsen or fail to improve.       There are no Patient Instructions on file for this visit.      Discussion: Will treat the patient for acute nausea vomiting with loose stool.  No evidence of bacterial infection and no abdominal pain or bloody bowel movement.  I have advised oral rehydration and a short course of Zofran.  She is to watch out for worsening symptoms specifically abdominal pain, fever or blood in the bowel movements.     Baldomero Melendez MD.7/2/2025      Electronically signed.    EMR Dictation/Transcription disclaimer:   Some of this note may be an electronic  transcription/translation of spoken language to printed text. The electronic translation of spoken language may permit erroneous, or at times, nonsensical words or phrases to be inadvertently transcribed; Although I have reviewed the note for such errors, some may still exist.

## 2025-07-03 ENCOUNTER — HOSPITAL ENCOUNTER (INPATIENT)
Facility: HOSPITAL | Age: 75
LOS: 5 days | Discharge: HOME | End: 2025-07-08
Attending: EMERGENCY MEDICINE | Admitting: INTERNAL MEDICINE
Payer: MEDICARE

## 2025-07-03 ENCOUNTER — APPOINTMENT (OUTPATIENT)
Dept: GENERAL RADIOLOGY | Facility: HOSPITAL | Age: 75
End: 2025-07-03
Payer: MEDICARE

## 2025-07-03 DIAGNOSIS — N17.9 AKI (ACUTE KIDNEY INJURY): Primary | ICD-10-CM

## 2025-07-03 PROBLEM — E87.20 METABOLIC ACIDOSIS: Status: ACTIVE | Noted: 2025-07-03

## 2025-07-03 PROBLEM — E83.42 HYPOMAGNESEMIA: Status: ACTIVE | Noted: 2025-07-03

## 2025-07-03 PROBLEM — R19.7 DIARRHEA: Status: ACTIVE | Noted: 2025-07-03

## 2025-07-03 LAB
ALBUMIN SERPL-MCNC: 2.8 G/DL (ref 3.5–5.2)
ALBUMIN/GLOB SERPL: 0.7 G/DL
ALP SERPL-CCNC: 99 U/L (ref 39–117)
ALT SERPL W P-5'-P-CCNC: 16 U/L (ref 1–33)
ANION GAP SERPL CALCULATED.3IONS-SCNC: 17 MMOL/L (ref 5–15)
AST SERPL-CCNC: 30 U/L (ref 1–32)
BASOPHILS # BLD MANUAL: 0 10*3/MM3 (ref 0–0.2)
BASOPHILS NFR BLD MANUAL: 0 % (ref 0–1.5)
BILIRUB SERPL-MCNC: 0.8 MG/DL (ref 0–1.2)
BUN SERPL-MCNC: 70.8 MG/DL (ref 8–23)
BUN/CREAT SERPL: 18.3 (ref 7–25)
CALCIUM SPEC-SCNC: 8.5 MG/DL (ref 8.6–10.5)
CHLORIDE SERPL-SCNC: 101 MMOL/L (ref 98–107)
CO2 SERPL-SCNC: 17 MMOL/L (ref 22–29)
CREAT SERPL-MCNC: 3.87 MG/DL (ref 0.57–1)
DEPRECATED RDW RBC AUTO: 43.3 FL (ref 37–54)
DOHLE BOD BLD QL SMEAR: PRESENT
EGFRCR SERPLBLD CKD-EPI 2021: 11.7 ML/MIN/1.73
EOSINOPHIL # BLD MANUAL: 0 10*3/MM3 (ref 0–0.4)
EOSINOPHIL NFR BLD MANUAL: 0 % (ref 0.3–6.2)
ERYTHROCYTE [DISTWIDTH] IN BLOOD BY AUTOMATED COUNT: 12.9 % (ref 12.3–15.4)
GLOBULIN UR ELPH-MCNC: 3.9 GM/DL
GLUCOSE SERPL-MCNC: 158 MG/DL (ref 65–99)
HCT VFR BLD AUTO: 33.3 % (ref 34–46.6)
HGB BLD-MCNC: 11.4 G/DL (ref 12–15.9)
HOLD SPECIMEN: NORMAL
HOLD SPECIMEN: NORMAL
LYMPHOCYTES # BLD MANUAL: 0.52 10*3/MM3 (ref 0.7–3.1)
LYMPHOCYTES NFR BLD MANUAL: 5 % (ref 5–12)
MAGNESIUM SERPL-MCNC: 1.2 MG/DL (ref 1.6–2.4)
MCH RBC QN AUTO: 31.6 PG (ref 26.6–33)
MCHC RBC AUTO-ENTMCNC: 34.2 G/DL (ref 31.5–35.7)
MCV RBC AUTO: 92.2 FL (ref 79–97)
MONOCYTES # BLD: 0.66 10*3/MM3 (ref 0.1–0.9)
NEUTROPHILS # BLD AUTO: 11.95 10*3/MM3 (ref 1.7–7)
NEUTROPHILS NFR BLD MANUAL: 86.1 % (ref 42.7–76)
NEUTS BAND NFR BLD MANUAL: 5 % (ref 0–5)
NEUTS VAC BLD QL SMEAR: ABNORMAL
PLATELET # BLD AUTO: 98 10*3/MM3 (ref 140–450)
PMV BLD AUTO: 10.5 FL (ref 6–12)
POLYCHROMASIA BLD QL SMEAR: ABNORMAL
POTASSIUM SERPL-SCNC: 3.7 MMOL/L (ref 3.5–5.2)
PROT SERPL-MCNC: 6.7 G/DL (ref 6–8.5)
RBC # BLD AUTO: 3.61 10*6/MM3 (ref 3.77–5.28)
RSV RNA RESP QL NAA+PROBE: NOT DETECTED
SARS-COV-2 RNA RESP QL NAA+PROBE: NOT DETECTED
SMALL PLATELETS BLD QL SMEAR: ABNORMAL
SODIUM SERPL-SCNC: 135 MMOL/L (ref 136–145)
VARIANT LYMPHS NFR BLD MANUAL: 1 % (ref 0–5)
VARIANT LYMPHS NFR BLD MANUAL: 3 % (ref 19.6–45.3)
WBC NRBC COR # BLD AUTO: 13.12 10*3/MM3 (ref 3.4–10.8)
WHOLE BLOOD HOLD COAG: NORMAL
WHOLE BLOOD HOLD SPECIMEN: NORMAL

## 2025-07-03 PROCEDURE — 85007 BL SMEAR W/DIFF WBC COUNT: CPT | Performed by: EMERGENCY MEDICINE

## 2025-07-03 PROCEDURE — 36415 COLL VENOUS BLD VENIPUNCTURE: CPT

## 2025-07-03 PROCEDURE — 85025 COMPLETE CBC W/AUTO DIFF WBC: CPT | Performed by: EMERGENCY MEDICINE

## 2025-07-03 PROCEDURE — 87634 RSV DNA/RNA AMP PROBE: CPT | Performed by: EMERGENCY MEDICINE

## 2025-07-03 PROCEDURE — 71045 X-RAY EXAM CHEST 1 VIEW: CPT

## 2025-07-03 PROCEDURE — 25810000003 SODIUM CHLORIDE 0.9 % SOLUTION: Performed by: EMERGENCY MEDICINE

## 2025-07-03 PROCEDURE — 25010000002 MAGNESIUM SULFATE 2 GM/50ML SOLUTION: Performed by: EMERGENCY MEDICINE

## 2025-07-03 PROCEDURE — 87635 SARS-COV-2 COVID-19 AMP PRB: CPT | Performed by: EMERGENCY MEDICINE

## 2025-07-03 PROCEDURE — 80053 COMPREHEN METABOLIC PANEL: CPT | Performed by: EMERGENCY MEDICINE

## 2025-07-03 PROCEDURE — 83735 ASSAY OF MAGNESIUM: CPT | Performed by: EMERGENCY MEDICINE

## 2025-07-03 PROCEDURE — 99285 EMERGENCY DEPT VISIT HI MDM: CPT | Performed by: EMERGENCY MEDICINE

## 2025-07-03 RX ORDER — LABETALOL HYDROCHLORIDE 5 MG/ML
10 INJECTION, SOLUTION INTRAVENOUS EVERY 6 HOURS PRN
Status: DISCONTINUED | OUTPATIENT
Start: 2025-07-03 | End: 2025-07-08 | Stop reason: HOSPADM

## 2025-07-03 RX ORDER — MAGNESIUM SULFATE HEPTAHYDRATE 40 MG/ML
2 INJECTION, SOLUTION INTRAVENOUS ONCE
Status: COMPLETED | OUTPATIENT
Start: 2025-07-03 | End: 2025-07-03

## 2025-07-03 RX ORDER — ACETAMINOPHEN 325 MG/1
650 TABLET ORAL EVERY 4 HOURS PRN
Status: DISCONTINUED | OUTPATIENT
Start: 2025-07-03 | End: 2025-07-08 | Stop reason: HOSPADM

## 2025-07-03 RX ORDER — ONDANSETRON 2 MG/ML
4 INJECTION INTRAMUSCULAR; INTRAVENOUS EVERY 6 HOURS PRN
Status: DISCONTINUED | OUTPATIENT
Start: 2025-07-03 | End: 2025-07-08 | Stop reason: HOSPADM

## 2025-07-03 RX ORDER — SODIUM CHLORIDE 9 MG/ML
40 INJECTION, SOLUTION INTRAVENOUS AS NEEDED
Status: DISCONTINUED | OUTPATIENT
Start: 2025-07-03 | End: 2025-07-08 | Stop reason: HOSPADM

## 2025-07-03 RX ORDER — SODIUM CHLORIDE 0.9 % (FLUSH) 0.9 %
10 SYRINGE (ML) INJECTION AS NEEDED
Status: DISCONTINUED | OUTPATIENT
Start: 2025-07-03 | End: 2025-07-08 | Stop reason: HOSPADM

## 2025-07-03 RX ORDER — SODIUM CHLORIDE 0.9 % (FLUSH) 0.9 %
10 SYRINGE (ML) INJECTION EVERY 12 HOURS SCHEDULED
Status: DISCONTINUED | OUTPATIENT
Start: 2025-07-04 | End: 2025-07-08 | Stop reason: HOSPADM

## 2025-07-03 RX ORDER — HEPARIN SODIUM 5000 [USP'U]/ML
5000 INJECTION, SOLUTION INTRAVENOUS; SUBCUTANEOUS EVERY 12 HOURS SCHEDULED
Status: DISCONTINUED | OUTPATIENT
Start: 2025-07-04 | End: 2025-07-08 | Stop reason: HOSPADM

## 2025-07-03 RX ADMIN — SODIUM CHLORIDE 500 ML: 9 INJECTION, SOLUTION INTRAVENOUS at 22:30

## 2025-07-03 RX ADMIN — MAGNESIUM SULFATE HEPTAHYDRATE 2 G: 40 INJECTION, SOLUTION INTRAVENOUS at 22:32

## 2025-07-03 NOTE — Clinical Note
Level of Care: Med/Surg [1]   Diagnosis: JAYDA (acute kidney injury) [[id number]]   Admitting Physician: SANTI PASTRANA [[id number]]   Attending Physician: SANTI PASTRANA [[id number]]   Isolate for COVID?: No [0]   Certification: I Certify That Inpatient Hospital Services Are Medically Necessary For Greater Than 2 Midnights

## 2025-07-04 ENCOUNTER — APPOINTMENT (OUTPATIENT)
Dept: ULTRASOUND IMAGING | Facility: HOSPITAL | Age: 75
End: 2025-07-04
Payer: MEDICARE

## 2025-07-04 PROBLEM — N18.32 ACUTE RENAL FAILURE SUPERIMPOSED ON STAGE 3B CHRONIC KIDNEY DISEASE: Status: ACTIVE | Noted: 2025-07-03

## 2025-07-04 PROBLEM — N30.90 CYSTITIS: Status: ACTIVE | Noted: 2025-07-04

## 2025-07-04 LAB
ALBUMIN UR-MCNC: 18.8 MG/DL
ANION GAP SERPL CALCULATED.3IONS-SCNC: 13 MMOL/L (ref 5–15)
ANION GAP SERPL CALCULATED.3IONS-SCNC: 15 MMOL/L (ref 5–15)
ANION GAP SERPL CALCULATED.3IONS-SCNC: 15 MMOL/L (ref 5–15)
ANION GAP SERPL CALCULATED.3IONS-SCNC: 16 MMOL/L (ref 5–15)
BACTERIA UR QL AUTO: ABNORMAL /HPF
BASOPHILS # BLD MANUAL: 0 10*3/MM3 (ref 0–0.2)
BASOPHILS NFR BLD MANUAL: 0 % (ref 0–1.5)
BILIRUB UR QL STRIP: NEGATIVE
BUN SERPL-MCNC: 67.4 MG/DL (ref 8–23)
BUN SERPL-MCNC: 68.4 MG/DL (ref 8–23)
BUN SERPL-MCNC: 68.6 MG/DL (ref 8–23)
BUN SERPL-MCNC: 71.4 MG/DL (ref 8–23)
BUN/CREAT SERPL: 18.8 (ref 7–25)
BUN/CREAT SERPL: 19 (ref 7–25)
BUN/CREAT SERPL: 19.5 (ref 7–25)
BUN/CREAT SERPL: 19.8 (ref 7–25)
CALCIUM SPEC-SCNC: 8.1 MG/DL (ref 8.6–10.5)
CALCIUM SPEC-SCNC: 8.1 MG/DL (ref 8.6–10.5)
CALCIUM SPEC-SCNC: 8.2 MG/DL (ref 8.6–10.5)
CALCIUM SPEC-SCNC: 8.2 MG/DL (ref 8.6–10.5)
CHLORIDE SERPL-SCNC: 102 MMOL/L (ref 98–107)
CHLORIDE SERPL-SCNC: 103 MMOL/L (ref 98–107)
CHLORIDE SERPL-SCNC: 104 MMOL/L (ref 98–107)
CHLORIDE SERPL-SCNC: 104 MMOL/L (ref 98–107)
CLARITY UR: ABNORMAL
CLUMPED PLATELETS: PRESENT
CO2 SERPL-SCNC: 15 MMOL/L (ref 22–29)
CO2 SERPL-SCNC: 18 MMOL/L (ref 22–29)
CO2 SERPL-SCNC: 19 MMOL/L (ref 22–29)
CO2 SERPL-SCNC: 20 MMOL/L (ref 22–29)
COLOR UR: YELLOW
CREAT SERPL-MCNC: 3.46 MG/DL (ref 0.57–1)
CREAT SERPL-MCNC: 3.47 MG/DL (ref 0.57–1)
CREAT SERPL-MCNC: 3.64 MG/DL (ref 0.57–1)
CREAT SERPL-MCNC: 3.75 MG/DL (ref 0.57–1)
CREAT UR-MCNC: 60.5 MG/DL
DEPRECATED RDW RBC AUTO: 43.5 FL (ref 37–54)
EGFRCR SERPLBLD CKD-EPI 2021: 12.1 ML/MIN/1.73
EGFRCR SERPLBLD CKD-EPI 2021: 12.6 ML/MIN/1.73
EGFRCR SERPLBLD CKD-EPI 2021: 13.3 ML/MIN/1.73
EGFRCR SERPLBLD CKD-EPI 2021: 13.4 ML/MIN/1.73
EOSINOPHIL # BLD MANUAL: 0 10*3/MM3 (ref 0–0.4)
EOSINOPHIL NFR BLD MANUAL: 0 % (ref 0.3–6.2)
ERYTHROCYTE [DISTWIDTH] IN BLOOD BY AUTOMATED COUNT: 12.9 % (ref 12.3–15.4)
FERRITIN SERPL-MCNC: 719.6 NG/ML (ref 13–150)
GLUCOSE SERPL-MCNC: 124 MG/DL (ref 65–99)
GLUCOSE SERPL-MCNC: 126 MG/DL (ref 65–99)
GLUCOSE SERPL-MCNC: 135 MG/DL (ref 65–99)
GLUCOSE SERPL-MCNC: 152 MG/DL (ref 65–99)
GLUCOSE UR STRIP-MCNC: NEGATIVE MG/DL
HCT VFR BLD AUTO: 29.8 % (ref 34–46.6)
HGB BLD-MCNC: 10.1 G/DL (ref 12–15.9)
HGB UR QL STRIP.AUTO: ABNORMAL
HYALINE CASTS UR QL AUTO: ABNORMAL /LPF
IRON 24H UR-MRATE: 15 MCG/DL (ref 37–145)
IRON SATN MFR SERPL: 10 % (ref 20–50)
KETONES UR QL STRIP: NEGATIVE
LEUKOCYTE ESTERASE UR QL STRIP.AUTO: ABNORMAL
LYMPHOCYTES # BLD MANUAL: 0.55 10*3/MM3 (ref 0.7–3.1)
LYMPHOCYTES NFR BLD MANUAL: 4 % (ref 5–12)
MAGNESIUM SERPL-MCNC: 2 MG/DL (ref 1.6–2.4)
MCH RBC QN AUTO: 31.3 PG (ref 26.6–33)
MCHC RBC AUTO-ENTMCNC: 33.9 G/DL (ref 31.5–35.7)
MCV RBC AUTO: 92.3 FL (ref 79–97)
MICROALBUMIN/CREAT UR: 310.7 MG/G (ref 0–29)
MONOCYTES # BLD: 0.55 10*3/MM3 (ref 0.1–0.9)
NEUTROPHILS # BLD AUTO: 12.54 10*3/MM3 (ref 1.7–7)
NEUTROPHILS NFR BLD MANUAL: 91 % (ref 42.7–76)
NEUTS BAND NFR BLD MANUAL: 1 % (ref 0–5)
NITRITE UR QL STRIP: NEGATIVE
OSMOLALITY UR: 291 MOSM/KG (ref 50–1400)
PH UR STRIP.AUTO: 5.5 [PH] (ref 5–8)
PHOSPHATE SERPL-MCNC: 4.3 MG/DL (ref 2.5–4.5)
PLATELET # BLD AUTO: 105 10*3/MM3 (ref 140–450)
PMV BLD AUTO: 11.5 FL (ref 6–12)
POLYCHROMASIA BLD QL SMEAR: ABNORMAL
POTASSIUM SERPL-SCNC: 3.1 MMOL/L (ref 3.5–5.2)
POTASSIUM SERPL-SCNC: 3.2 MMOL/L (ref 3.5–5.2)
POTASSIUM SERPL-SCNC: 3.4 MMOL/L (ref 3.5–5.2)
POTASSIUM SERPL-SCNC: 3.5 MMOL/L (ref 3.5–5.2)
POTASSIUM UR-SCNC: 18 MMOL/L
PROT ?TM UR-MCNC: 61.1 MG/DL
PROT UR QL STRIP: ABNORMAL
PTH-INTACT SERPL-MCNC: 55.2 PG/ML (ref 15–65)
RBC # BLD AUTO: 3.23 10*6/MM3 (ref 3.77–5.28)
RBC # UR STRIP: ABNORMAL /HPF
REF LAB TEST METHOD: ABNORMAL
SMALL PLATELETS BLD QL SMEAR: ABNORMAL
SODIUM SERPL-SCNC: 135 MMOL/L (ref 136–145)
SODIUM SERPL-SCNC: 135 MMOL/L (ref 136–145)
SODIUM SERPL-SCNC: 137 MMOL/L (ref 136–145)
SODIUM SERPL-SCNC: 137 MMOL/L (ref 136–145)
SODIUM UR-SCNC: 41 MMOL/L
SODIUM UR-SCNC: 42 MMOL/L
SP GR UR STRIP: 1.01 (ref 1–1.03)
SQUAMOUS #/AREA URNS HPF: ABNORMAL /HPF
TIBC SERPL-MCNC: 156 MCG/DL (ref 298–536)
TRANSFERRIN SERPL-MCNC: 105 MG/DL (ref 200–360)
URATE SERPL-MCNC: 8.5 MG/DL (ref 2.4–5.7)
UROBILINOGEN UR QL STRIP: ABNORMAL
UUN 24H UR-MCNC: 410 MG/DL
VARIANT LYMPHS NFR BLD MANUAL: 1 % (ref 0–5)
VARIANT LYMPHS NFR BLD MANUAL: 3 % (ref 19.6–45.3)
WBC # UR STRIP: ABNORMAL /HPF
WBC MORPH BLD: NORMAL
WBC NRBC COR # BLD AUTO: 13.63 10*3/MM3 (ref 3.4–10.8)

## 2025-07-04 PROCEDURE — 25010000002 HEPARIN (PORCINE) PER 1000 UNITS: Performed by: INTERNAL MEDICINE

## 2025-07-04 PROCEDURE — 25010000002 SODIUM CHLORIDE 0.9 % WITH KCL 20 MEQ 20-0.9 MEQ/L-% SOLUTION: Performed by: INTERNAL MEDICINE

## 2025-07-04 PROCEDURE — 83540 ASSAY OF IRON: CPT

## 2025-07-04 PROCEDURE — 84300 ASSAY OF URINE SODIUM: CPT

## 2025-07-04 PROCEDURE — 87205 SMEAR GRAM STAIN: CPT | Performed by: CLINICAL NURSE SPECIALIST

## 2025-07-04 PROCEDURE — 84550 ASSAY OF BLOOD/URIC ACID: CPT | Performed by: INTERNAL MEDICINE

## 2025-07-04 PROCEDURE — 85025 COMPLETE CBC W/AUTO DIFF WBC: CPT | Performed by: INTERNAL MEDICINE

## 2025-07-04 PROCEDURE — 87186 SC STD MICRODIL/AGAR DIL: CPT

## 2025-07-04 PROCEDURE — 80048 BASIC METABOLIC PNL TOTAL CA: CPT | Performed by: INTERNAL MEDICINE

## 2025-07-04 PROCEDURE — 83735 ASSAY OF MAGNESIUM: CPT | Performed by: INTERNAL MEDICINE

## 2025-07-04 PROCEDURE — 84133 ASSAY OF URINE POTASSIUM: CPT

## 2025-07-04 PROCEDURE — 82306 VITAMIN D 25 HYDROXY: CPT | Performed by: INTERNAL MEDICINE

## 2025-07-04 PROCEDURE — 81001 URINALYSIS AUTO W/SCOPE: CPT

## 2025-07-04 PROCEDURE — 82043 UR ALBUMIN QUANTITATIVE: CPT

## 2025-07-04 PROCEDURE — 83970 ASSAY OF PARATHORMONE: CPT

## 2025-07-04 PROCEDURE — 84156 ASSAY OF PROTEIN URINE: CPT | Performed by: CLINICAL NURSE SPECIALIST

## 2025-07-04 PROCEDURE — 84100 ASSAY OF PHOSPHORUS: CPT | Performed by: INTERNAL MEDICINE

## 2025-07-04 PROCEDURE — 87086 URINE CULTURE/COLONY COUNT: CPT

## 2025-07-04 PROCEDURE — 87088 URINE BACTERIA CULTURE: CPT

## 2025-07-04 PROCEDURE — 84466 ASSAY OF TRANSFERRIN: CPT

## 2025-07-04 PROCEDURE — 85007 BL SMEAR W/DIFF WBC COUNT: CPT | Performed by: INTERNAL MEDICINE

## 2025-07-04 PROCEDURE — 36415 COLL VENOUS BLD VENIPUNCTURE: CPT | Performed by: INTERNAL MEDICINE

## 2025-07-04 PROCEDURE — 25010000002 CEFTRIAXONE PER 250 MG

## 2025-07-04 PROCEDURE — 80048 BASIC METABOLIC PNL TOTAL CA: CPT

## 2025-07-04 PROCEDURE — 84300 ASSAY OF URINE SODIUM: CPT | Performed by: INTERNAL MEDICINE

## 2025-07-04 PROCEDURE — 83935 ASSAY OF URINE OSMOLALITY: CPT

## 2025-07-04 PROCEDURE — 76775 US EXAM ABDO BACK WALL LIM: CPT

## 2025-07-04 PROCEDURE — 82728 ASSAY OF FERRITIN: CPT

## 2025-07-04 PROCEDURE — 82570 ASSAY OF URINE CREATININE: CPT | Performed by: CLINICAL NURSE SPECIALIST

## 2025-07-04 PROCEDURE — 25810000003 SODIUM CHLORIDE 0.9 % SOLUTION: Performed by: INTERNAL MEDICINE

## 2025-07-04 PROCEDURE — 25010000003 DEXTROSE 5 % SOLUTION 1,000 ML FLEX CONT: Performed by: INTERNAL MEDICINE

## 2025-07-04 PROCEDURE — 84540 ASSAY OF URINE/UREA-N: CPT

## 2025-07-04 PROCEDURE — 25010000002 NA FERRIC GLUC CPLX PER 12.5 MG: Performed by: INTERNAL MEDICINE

## 2025-07-04 RX ORDER — BUTALBITAL, ACETAMINOPHEN AND CAFFEINE 50; 325; 40 MG/1; MG/1; MG/1
1 TABLET ORAL EVERY 6 HOURS PRN
Status: DISPENSED | OUTPATIENT
Start: 2025-07-04 | End: 2025-07-06

## 2025-07-04 RX ORDER — SODIUM CHLORIDE AND POTASSIUM CHLORIDE 150; 900 MG/100ML; MG/100ML
125 INJECTION, SOLUTION INTRAVENOUS CONTINUOUS
Status: DISPENSED | OUTPATIENT
Start: 2025-07-04 | End: 2025-07-05

## 2025-07-04 RX ORDER — BUTALBITAL, ACETAMINOPHEN AND CAFFEINE 50; 325; 40 MG/1; MG/1; MG/1
1 TABLET ORAL EVERY 6 HOURS PRN
Status: DISCONTINUED | OUTPATIENT
Start: 2025-07-04 | End: 2025-07-04

## 2025-07-04 RX ORDER — FLUTICASONE PROPIONATE 50 MCG
2 SPRAY, SUSPENSION (ML) NASAL DAILY
Status: DISCONTINUED | OUTPATIENT
Start: 2025-07-04 | End: 2025-07-08 | Stop reason: HOSPADM

## 2025-07-04 RX ORDER — ESCITALOPRAM OXALATE 10 MG/1
20 TABLET ORAL DAILY
Status: DISCONTINUED | OUTPATIENT
Start: 2025-07-04 | End: 2025-07-08 | Stop reason: HOSPADM

## 2025-07-04 RX ORDER — POTASSIUM CHLORIDE 1500 MG/1
40 TABLET, EXTENDED RELEASE ORAL EVERY 4 HOURS
Status: COMPLETED | OUTPATIENT
Start: 2025-07-04 | End: 2025-07-04

## 2025-07-04 RX ADMIN — POTASSIUM CHLORIDE 40 MEQ: 1500 TABLET, EXTENDED RELEASE ORAL at 11:12

## 2025-07-04 RX ADMIN — SODIUM CHLORIDE AND POTASSIUM CHLORIDE 125 ML/HR: 150; 900 INJECTION, SOLUTION INTRAVENOUS at 20:55

## 2025-07-04 RX ADMIN — HEPARIN SODIUM 5000 UNITS: 5000 INJECTION INTRAVENOUS; SUBCUTANEOUS at 20:54

## 2025-07-04 RX ADMIN — Medication 10 ML: at 01:14

## 2025-07-04 RX ADMIN — DEXTROSE MONOHYDRATE 150 MEQ: 50 INJECTION, SOLUTION INTRAVENOUS at 01:14

## 2025-07-04 RX ADMIN — Medication 10 ML: at 20:55

## 2025-07-04 RX ADMIN — BUTALBITAL, ACETAMINOPHEN, AND CAFFEINE 1 TABLET: 50; 325; 40 TABLET ORAL at 10:29

## 2025-07-04 RX ADMIN — ACETAMINOPHEN 650 MG: 325 TABLET ORAL at 07:18

## 2025-07-04 RX ADMIN — BUTALBITAL, ACETAMINOPHEN, AND CAFFEINE 1 TABLET: 50; 325; 40 TABLET ORAL at 20:54

## 2025-07-04 RX ADMIN — HEPARIN SODIUM 5000 UNITS: 5000 INJECTION INTRAVENOUS; SUBCUTANEOUS at 10:12

## 2025-07-04 RX ADMIN — SODIUM CHLORIDE 250 MG: 9 INJECTION, SOLUTION INTRAVENOUS at 17:22

## 2025-07-04 RX ADMIN — ACETAMINOPHEN 650 MG: 325 TABLET ORAL at 01:13

## 2025-07-04 RX ADMIN — CEFTRIAXONE 2000 MG: 2 INJECTION, POWDER, FOR SOLUTION INTRAMUSCULAR; INTRAVENOUS at 11:02

## 2025-07-04 RX ADMIN — POTASSIUM CHLORIDE 40 MEQ: 1500 TABLET, EXTENDED RELEASE ORAL at 14:19

## 2025-07-04 RX ADMIN — ESCITALOPRAM 20 MG: 10 TABLET, FILM COATED ORAL at 14:19

## 2025-07-04 RX ADMIN — HEPARIN SODIUM 5000 UNITS: 5000 INJECTION INTRAVENOUS; SUBCUTANEOUS at 01:14

## 2025-07-04 NOTE — CONSULTS
Nephrology ([facility]) Consult Note      Patient:  Sarah Littlejohn  YOB: 1950  Date of Service: 7/4/2025  MRN: [id number]   Acct: [id number]   Primary Care Physician: Michael Singleton DO  Advance Directive:   Code Status and Medical Interventions: No CPR (Do Not Attempt to Resuscitate); Full Support   Ordered at: 07/04/25 0012     Code Status (Patient has no pulse and is not breathing):    No CPR (Do Not Attempt to Resuscitate)     Medical Interventions (Patient has pulse or is breathing):    Full Support     Level Of Support Discussed With:    Patient     Admit Date: 7/3/2025       Hospital Day: 1  Referring Provider: Reid King DO      Patient Seen, Chart, Consults, Notes, Labs, Radiology studies reviewed.    Chief complaint: Abnormal labs.    Subjective:  Sarah Littlejohn is a 75 y.o. female  whom we were consulted for acute kidney injury/chronic kidney disease.  Patient has stage IIIb chronic kidney disease, follows Dr. Santiago.  Patient also has history of hypertension, MGUS, anemia and follows hematology.  Presented to the emergency room complaining of nausea vomiting and diarrhea.  She has noticed dizziness.  She was unable to drink or eat anything because of persistent vomiting.  Her urine output has significantly depressed dropped as well.  She decided come to the hospital.  In ER lab data indicated serum creatinine is 3.8 mg estimated GFR of 11 mL.  Her baseline creatinine usually 1.3 mg.  Hospital course markable for fluid resuscitation, has received bolus of IV fluids followed by maintenance IV fluid.  Patient has noticed large urine output.    This afternoon she is still complaining of diarrhea but has slowed down.  She has only 2 bowel movements this morning    Allergies:  Wellbutrin [bupropion], Neosporin [neomycin-bacitracin zn-polymyx], and Penicillins    Home Meds:  Medications Prior to Admission   Medication Sig Dispense Refill  Last Dose/Taking    allopurinol (ZYLOPRIM) 100 MG tablet Take 1 tablet by mouth Daily. 90 tablet 1 Taking    Ascorbic Acid (VITAMIN C GUMMIE PO) Take 3 tablets by mouth Daily.   Taking    calcium carb-cholecalciferol (Calcium 600+D3) 600-10 MG-MCG tablet per tablet Take 1 tablet by mouth Daily.   Taking    cetirizine (zyrTEC) 10 MG tablet Take 1 tablet by mouth Daily. Obtain OTC   Taking    escitalopram (LEXAPRO) 20 MG tablet Take 1 tablet by mouth Daily. 90 tablet 3 Taking    lisinopril-hydrochlorothiazide (Zestoretic) 20-25 MG per tablet Take 1 tablet by mouth Daily. 90 tablet 1 Taking    Magnesium 250 MG tablet Take 2 tablets by mouth Daily.   Taking    ondansetron (ZOFRAN) 4 MG tablet Take 1 tablet by mouth Every 4 (Four) Hours As Needed for Nausea or Vomiting. 20 tablet 0        Medicines:  Current Facility-Administered Medications   Medication Dose Route Frequency Provider Last Rate Last Admin    acetaminophen (TYLENOL) tablet 650 mg  650 mg Oral Q4H PRN Reid King DO   650 mg at 07/04/25 0718    butalbital-acetaminophen-caffeine (FIORICET, ESGIC) -40 MG per tablet 1 tablet  1 tablet Oral Q6H PRN Radha Hussein APRN   1 tablet at 07/04/25 1029    cefTRIAXone (ROCEPHIN) 2,000 mg in sodium chloride 0.9 % 100 mL MBP  2,000 mg Intravenous Q24H Radha Hussein APRN 200 mL/hr at 07/04/25 1102 2,000 mg at 07/04/25 1102    escitalopram (LEXAPRO) tablet 20 mg  20 mg Oral Daily Radha Hussein APRN   20 mg at 07/04/25 1419    fluticasone (FLONASE) 50 MCG/ACT nasal spray 2 spray  2 spray Each Nare Daily Radha Hussein APRN        heparin (porcine) 5000 UNIT/ML injection 5,000 Units  5,000 Units Subcutaneous Q12H Reid King DO   5,000 Units at 07/04/25 1012    labetalol (NORMODYNE,TRANDATE) injection 10 mg  10 mg Intravenous Q6H PRN Christine, Reid F, DO        ondansetron (ZOFRAN) injection 4 mg  4 mg Intravenous Q6H PRN Reid King, DO        sodium chloride 0.9 % flush 10 mL  10 mL  Intravenous PRN Graham Terry Jr., MD        sodium chloride 0.9 % flush 10 mL  10 mL Intravenous PRN Graham Terry Jr., MD        sodium chloride 0.9 % flush 10 mL  10 mL Intravenous Q12H Reid King DO   10 mL at 25 0114    sodium chloride 0.9 % flush 10 mL  10 mL Intravenous PRN Reid King DO        sodium chloride 0.9 % infusion 40 mL  40 mL Intravenous PRN Reid King DO           Past Medical History:  Past Medical History:   Diagnosis Date    Anxiety     CKD (chronic kidney disease) stage 3, GFR 30-59 ml/min     Depression     Hypertension     Seasonal allergies        Past Surgical History:  Past Surgical History:   Procedure Laterality Date    APPENDECTOMY       SECTION      CHOLECYSTECTOMY      KNEE SURGERY         Family History  Family History   Problem Relation Age of Onset    Cancer Mother         non-hodgkins lymphoma    Heart disease Father     Cancer Father         leukemia    No Known Problems Brother     No Known Problems Sister     No Known Problems Daughter     No Known Problems Son     No Known Problems Maternal Grandmother     No Known Problems Paternal Grandmother     No Known Problems Maternal Aunt     No Known Problems Paternal Aunt     BRCA 1/2 Neg Hx     Breast cancer Neg Hx     Colon cancer Neg Hx     Endometrial cancer Neg Hx     Ovarian cancer Neg Hx     Uterine cancer Neg Hx        Social History  Social History     Socioeconomic History    Marital status:    Tobacco Use    Smoking status: Never     Passive exposure: Never    Smokeless tobacco: Never   Vaping Use    Vaping status: Never Used   Substance and Sexual Activity    Alcohol use: No    Drug use: No    Sexual activity: Never         Review of Systems:  History obtained from chart review and the patient  General ROS: No fever or chills  Respiratory ROS: No cough, shortness of breath, wheezing  Cardiovascular ROS: no chest pain or dyspnea on exertion  Gastrointestinal ROS: Nausea  "vomiting diarrhea  Genito-Urinary ROS: No dysuria or hematuria  14 point ROS reviewed with the patient and negative except as noted above and in the HPI unless unable to obtain.    Objective:  /44 (BP Location: Left arm, Patient Position: Lying)   Pulse 56   Temp 98.2 °F (36.8 °C) (Oral)   Resp 16   Ht 160 cm (62.99\")   Wt 117 kg (258 lb)   SpO2 96%   BMI 45.71 kg/m²     Intake/Output Summary (Last 24 hours) at 7/4/2025 1537  Last data filed at 7/4/2025 1300  Gross per 24 hour   Intake 716.25 ml   Output 100 ml   Net 616.25 ml     General: awake/alert   HEENT: Normocephalic atraumatic head  Neck: Supple with no JVD or carotid bruits.  Chest:  clear to auscultation bilaterally without respiratory distress  CVS: regular rate and rhythm  Abdominal: soft, nontender, normal bowel sounds  Extremities: no cyanosis or edema  Skin: warm and dry without rash      Labs:    Results from last 7 days   Lab Units 07/04/25  1158 07/03/25  2155   WBC 10*3/mm3 13.63* 13.12*   HEMOGLOBIN g/dL 10.1* 11.4*   HEMATOCRIT % 29.8* 33.3*   PLATELETS 10*3/mm3 105* 98*       Results from last 7 days   Lab Units 07/04/25  1158 07/04/25  0920 07/04/25  0431 07/03/25  2155   SODIUM mmol/L 137 137 135* 135*   POTASSIUM mmol/L 3.2* 3.1* 3.4* 3.7   CHLORIDE mmol/L 102 103 104 101   CO2 mmol/L 20.0* 19.0* 18.0* 17.0*   BUN mg/dL 68.6* 68.4* 71.4* 70.8*   CREATININE mg/dL 3.47* 3.64* 3.75* 3.87*   CALCIUM mg/dL 8.1* 8.2* 8.1* 8.5*   BILIRUBIN mg/dL  --   --   --  0.8   ALK PHOS U/L  --   --   --  99   ALT (SGPT) U/L  --   --   --  16   AST (SGOT) U/L  --   --   --  30   GLUCOSE mg/dL 135* 126* 152* 158*   EGFR mL/min/1.73 13.3* 12.6* 12.1* 11.7*         Radiology:   Imaging Results (Last 24 Hours)       Procedure Component Value Units Date/Time    US Renal Bilateral [[id number]] Collected: 07/04/25 0620     Updated: 07/04/25 0626    Narrative:      EXAMINATION: US RENAL BILATERAL-  7/4/2025 6:20 AM     REASON FOR EXAM: panda on ckd, r/o " obstruction; N17.9-Acute kidney  failure, unspecified     COMPARISON: None       TECHNIQUE: Multiple longitudinal and transverse realtime sonographic  images of the kidneys and urinary bladder are obtained. Gray scale and  color Doppler images were provided. Report and images stored per  institutional and state regulations.     FINDINGS:     RIGHT:  The right kidney measures 12.2 x 4.2 x 5.8 cm. No hydronephrosis. At the  superior pole of the RIGHT kidney there is an anechoic cyst measuring up  to 5.2 x 5.7 x 5.8 cm. No aggressive features. In the interpolar region  of the RIGHT kidney there is a similar 2.9 x 3.1 x 3.9 cm simple cyst.  Questionable calculi also noted in the RIGHT kidney. I suspect mildly  increased renal cortical echogenicity.     LEFT:  The left kidney measures 13 x 4.9 x 4.6 cm. No hydronephrosis. No focal  parenchymal lesion. Slight renal cortical thinning and probable  scattered tiny calculi.     OTHER: IVC and aorta are largely obscured by bowel gas. Partially  decompressed urinary bladder.          Impression:         1.  No hydronephrosis. There are tiny bilateral calcifications  suggestive of renal calculi.     2.  Bilateral renal cysts.     3.  Possible cortical thinning bilaterally and some mild increased renal  parenchymal echogenicity on the RIGHT may suggest chronic renal  parenchymal disease.                 This report was signed and finalized on 7/4/2025 6:23 AM by Dr. Ashok Odell MD.       XR Chest 1 View [[id number]] Collected: 07/03/25 2137     Updated: 07/03/25 2143    Narrative:      EXAMINATION: XR CHEST 1 VW- 7/3/2025 9:37 PM     HISTORY: cough.     REPORT: Frontal view of the chest was obtained.     COMPARISON: Chest x-ray 3/17/2024.     The lungs are moderately hypoaerated, there is central basilar vascular  congestion and the heart is enlarged. The right hemidiaphragm is  obscured as before, this may be related to atelectasis, though  underlying right basilar pneumonia  "cannot be excluded with this  appearance. No pneumothorax is identified. There may be a small right  pleural effusion. No acute osseous abnormality.       Impression:      Moderate pulmonary hypoventilation with evidence of mild  CHF. Persistent opacities in the right lung base with obscuration of the  right hemidiaphragm, favored to represent atelectasis rather than  pneumonia but clinical correlation is recommended.     This report was signed and finalized on 7/3/2025 9:40 PM by Dr. Martin Forrester MD.               Culture:  No components found for: \"WOUNDCUL\", \"3\"  No components found for: \"CSFCUL\", \"3\"  No components found for: \"BC\", \"3\"  No components found for: \"URINECUL\", \"3\"      Assessment   1.  Acute kidney injury/worsening.  2.  Intravascular volume depletion.  3.  Stage IIIb CKD baseline.  4.  Hypertensive renal disease.  5.  New onset of gastroenteritis.  6.  Abdominal obesity  7.  Iron deficiency anemia    Plan:  1.  Agree with IV fluid resuscitation.  She has significant improvement of blood pressure and has noticed large urine output.  I will continue to push for more IV fluid to keep well-hydrated.  2.  Renal ultrasound reviewed, consistent bilateral cortical thinning as well as bilateral cortical nephrogenic cysts.  3.  Urinary electrolyte, UACR, vitamin D and serum PTH level.    4.  Intravenous ferric gluconate.  5.  Continue to monitor renal function.  Plan was discussed with the patient      Thank you for the consult, we appreciate the opportunity to provide care to your patients.  Feel free to contact me if I can be of any further assistance.      Sergio Weinstein MD  7/4/2025  15:37 CDT  "

## 2025-07-04 NOTE — H&P
[facility] Medicine Services  HISTORY AND PHYSICAL    Date of Admission: 7/3/2025  Primary Care Physician: Michael Singleton, DO    Subjective   Primary Historian: patient    Chief Complaint: weakness, diarrhea    History of Present Illness  Patient is a 75-year-old female with a history of CKD 3B, hypertension, MGUS who follows with nephrology and hematology.  She presents to the ER today for evaluation of persistent diarrhea with occasional nausea and vomiting for the last 4 to 5 days.  She is becoming increasingly weak generally but nonfocal.  She has been unable to eat or drink much of anything for the last 4-5 days.  She had some chills earlier today but no fevers.  She also feels like her urine output has been decreasing but she did make some urine earlier today.  No dysuria or pelvic pain.  She denies any abdominal pain.  No hematemesis or hematochezia.  No chest pain or shortness of breath.  She denies any sick contacts.  Vitals fairly stable on arrival to the ER.  Normal pulse and blood pressure.  On room air.  No significant fevers noted.  On labs however patient was found to have a creatinine of 3.87 with a GFR of 11.7.  Baseline is around 1.3 and 40.  Also had a magnesium of 1.2.  She was given small amount of IV fluids.  We have been asked to admit for further ongoing care.        Review of Systems   Otherwise complete ROS reviewed and negative except as mentioned in the HPI.    Past Medical History:   Past Medical History:   Diagnosis Date    Anxiety     CKD (chronic kidney disease) stage 3, GFR 30-59 ml/min     Depression     Hypertension     Seasonal allergies      Past Surgical History:  Past Surgical History:   Procedure Laterality Date    APPENDECTOMY       SECTION      CHOLECYSTECTOMY      KNEE SURGERY       Social History:  reports that she has never smoked. She has never been exposed to tobacco smoke. She has never used smokeless tobacco. She  reports that she does not drink alcohol and does not use drugs.    Family History: family history includes Cancer in her father and mother; Heart disease in her father; No Known Problems in her brother, daughter, maternal aunt, maternal grandmother, paternal aunt, paternal grandmother, sister, and son.       Allergies:  Allergies   Allergen Reactions    Wellbutrin [Bupropion] GI Intolerance     constipation    Neosporin [Neomycin-Bacitracin Zn-Polymyx] Rash    Penicillins Rash       Medications:  Prior to Admission medications    Medication Sig Start Date End Date Taking? Authorizing Provider   allopurinol (ZYLOPRIM) 100 MG tablet Take 1 tablet by mouth Daily. 1/28/25  Yes Michael Singleton DO   Ascorbic Acid (VITAMIN C GUMMIE PO) Take 3 tablets by mouth Daily.   Yes Petey Rosa MD   calcium carb-cholecalciferol (Calcium 600+D3) 600-10 MG-MCG tablet per tablet Take 1 tablet by mouth Daily.   Yes Petey Rosa MD   cetirizine (zyrTEC) 10 MG tablet Take 1 tablet by mouth Daily. Obtain OTC 3/19/24  Yes Aarti Smith APRN   Cholecalciferol (VITAMIN D3 PO) Take 1 tablet by mouth Daily.   Yes Petey Rosa MD   dapagliflozin Propanediol (Farxiga) 10 MG tablet Take 10 mg by mouth Daily. 1/28/25  Yes Michael Singleton DO   escitalopram (LEXAPRO) 20 MG tablet Take 1 tablet by mouth Daily. 7/26/24  Yes Oly Fishman APRN   lisinopril-hydrochlorothiazide (Zestoretic) 20-25 MG per tablet Take 1 tablet by mouth Daily. 1/28/25  Yes Michael Singleton DO   Magnesium 250 MG tablet Take 2 tablets by mouth Daily.   Yes Petey Rosa MD   ondansetron (ZOFRAN) 4 MG tablet Take 1 tablet by mouth Every 4 (Four) Hours As Needed for Nausea or Vomiting. 7/2/25  Yes Baldomero Melendez MD     I have utilized all available immediate resources to obtain, update, or review the patient's current medications (including all prescriptions, over-the-counter products, herbals, cannabis/cannabidiol  "products, and vitamin/mineral/dietary (nutritional) supplements).    Objective     Vital Signs: /47   Pulse 66   Temp 99.5 °F (37.5 °C)   Resp 20   Ht 160 cm (63\")   Wt 117 kg (258 lb)   SpO2 94%   BMI 45.70 kg/m²   Physical Exam   GEN: Awake, alert, interactive, in NAD  HEENT: PERRLA, EOMI, Anicteric, Trachea midline  Lungs: no wheezing/rales/rhonchi  Heart: RRR, +S1/s2, no rub  ABD: obese, soft, nt +BS, no guarding/rebound  Extremities: atraumatic, no cyanosis, no pitting edema  Skin: no rashes or petechiae  Neuro: AAOx3, no focal deficits  Psych: normal mood & affect        Results Reviewed:  Lab Results (last 24 hours)       Procedure Component Value Units Date/Time    CBC & Differential [[id number]]  (Abnormal) Collected: 07/03/25 2155    Specimen: Blood Updated: 07/03/25 2222    Narrative:      The following orders were created for panel order CBC & Differential.  Procedure                               Abnormality         Status                     ---------                               -----------         ------                     CBC Auto Differential[[id number]]        Abnormal            Final result                 Please view results for these tests on the individual orders.    CBC Auto Differential [[id number]]  (Abnormal) Collected: 07/03/25 2155    Specimen: Blood Updated: 07/03/25 2222     WBC 13.12 10*3/mm3      RBC 3.61 10*6/mm3      Hemoglobin 11.4 g/dL      Hematocrit 33.3 %      MCV 92.2 fL      MCH 31.6 pg      MCHC 34.2 g/dL      RDW 12.9 %      RDW-SD 43.3 fl      MPV 10.5 fL      Platelets 98 10*3/mm3     Manual Differential [[id number]]  (Abnormal) Collected: 07/03/25 2155    Specimen: Blood Updated: 07/03/25 2222     Neutrophil % 86.1 %      Lymphocyte % 3.0 %      Monocyte % 5.0 %      Eosinophil % 0.0 %      Basophil % 0.0 %      Bands %  5.0 %      Atypical Lymphocyte % 1.0 %      Neutrophils Absolute 11.95 10*3/mm3      Lymphocytes Absolute 0.52 10*3/mm3      Monocytes " Absolute 0.66 10*3/mm3      Eosinophils Absolute 0.00 10*3/mm3      Basophils Absolute 0.00 10*3/mm3      Polychromasia Slight/1+     Dohle Bodies Present     Vacuolated Neutrophils Slight/1+     Platelet Estimate Decreased    COVID PRE-OP / PRE-PROCEDURE SCREENING ORDER (NO ISOLATION) - Swab, Nasopharynx [[id number]]  (Normal) Collected: 07/03/25 2130    Specimen: Swab from Nasopharynx Updated: 07/03/25 2220    Narrative:      The following orders were created for panel order COVID PRE-OP / PRE-PROCEDURE SCREENING ORDER (NO ISOLATION) - Swab, Nasopharynx.  Procedure                               Abnormality         Status                     ---------                               -----------         ------                     COVID-19 + RSV PCR - Swa...[[id number]]  Normal              Final result                 Please view results for these tests on the individual orders.    COVID-19 + RSV PCR - Swab, Nasopharynx [[id number]]  (Normal) Collected: 07/03/25 2130    Specimen: Swab from Nasopharynx Updated: 07/03/25 2220     COVID19 Not Detected     RSV, PCR Not Detected    Comprehensive Metabolic Panel [[id number]]  (Abnormal) Collected: 07/03/25 2155    Specimen: Blood Updated: 07/03/25 2218     Glucose 158 mg/dL      BUN 70.8 mg/dL      Creatinine 3.87 mg/dL      Sodium 135 mmol/L      Potassium 3.7 mmol/L      Chloride 101 mmol/L      CO2 17.0 mmol/L      Calcium 8.5 mg/dL      Total Protein 6.7 g/dL      Albumin 2.8 g/dL      ALT (SGPT) 16 U/L      AST (SGOT) 30 U/L      Alkaline Phosphatase 99 U/L      Total Bilirubin 0.8 mg/dL      Globulin 3.9 gm/dL      A/G Ratio 0.7 g/dL      BUN/Creatinine Ratio 18.3     Anion Gap 17.0 mmol/L      eGFR 11.7 mL/min/1.73     Narrative:      GFR Categories in Chronic Kidney Disease (CKD)              GFR Category          GFR (mL/min/1.73)    Interpretation  G1                    90 or greater        Normal or high (1)  G2                    60-89                Mild decrease  (1)  G3a                   45-59                Mild to moderate decrease  G3b                   30-44                Moderate to severe decrease  G4                    15-29                Severe decrease  G5                    14 or less           Kidney failure    (1)In the absence of evidence of kidney disease, neither GFR category G1 or G2 fulfill the criteria for CKD.    eGFR calculation 2021 CKD-EPI creatinine equation, which does not include race as a factor    Magnesium [[id number]]  (Abnormal) Collected: 07/03/25 2155    Specimen: Blood Updated: 07/03/25 2218     Magnesium 1.2 mg/dL     [place] Draw [[id number]] Collected: 07/03/25 2119    Specimen: Blood Updated: 07/03/25 2131    Narrative:      The following orders were created for panel order [place] Draw.  Procedure                               Abnormality         Status                     ---------                               -----------         ------                     Green Top (Gel)[[id number]]                                  Final result               Lavender Top[[id number]]                                     Final result               Reagan Top[[id number]]                                         Final result               Light Blue Top[[id number]]                                   Final result                 Please view results for these tests on the individual orders.    Green Top (Gel) [[id number]] Collected: 07/03/25 2119    Specimen: Blood Updated: 07/03/25 2131     Extra Tube Hold for add-ons.     Comment: Auto resulted.       Lavender Top [[id number]] Collected: 07/03/25 2119    Specimen: Blood Updated: 07/03/25 2131     Extra Tube hold for add-on     Comment: Auto resulted       Gray Top [[id number]] Collected: 07/03/25 2119    Specimen: Blood Updated: 07/03/25 2131     Extra Tube Hold for add-ons.     Comment: Auto resulted.       Light Blue Top [[id number]] Collected: 07/03/25 2119    Specimen: Blood Updated: 07/03/25 2131     Extra Tube  Hold for add-ons.     Comment: Auto resulted             Imaging Results (Last 24 Hours)       Procedure Component Value Units Date/Time    XR Chest 1 View [[id number]] Collected: 07/03/25 2137     Updated: 07/03/25 2143    Narrative:      EXAMINATION: XR CHEST 1 VW- 7/3/2025 9:37 PM     HISTORY: cough.     REPORT: Frontal view of the chest was obtained.     COMPARISON: Chest x-ray 3/17/2024.     The lungs are moderately hypoaerated, there is central basilar vascular  congestion and the heart is enlarged. The right hemidiaphragm is  obscured as before, this may be related to atelectasis, though  underlying right basilar pneumonia cannot be excluded with this  appearance. No pneumothorax is identified. There may be a small right  pleural effusion. No acute osseous abnormality.       Impression:      Moderate pulmonary hypoventilation with evidence of mild  CHF. Persistent opacities in the right lung base with obscuration of the  right hemidiaphragm, favored to represent atelectasis rather than  pneumonia but clinical correlation is recommended.     This report was signed and finalized on 7/3/2025 9:40 PM by Dr. Martin Forrester MD.             I have personally reviewed and interpreted the radiology studies and ECG obtained at time of admission.     Assessment / Plan   Assessment:   Active Hospital Problems    Diagnosis     **JAYDA (acute kidney injury)     Diarrhea     Metabolic acidosis     Hypomagnesemia     MGUS (monoclonal gammopathy of unknown significance)     Primary hypertension        Treatment Plan  The patient will be admitted to my service here at [facility].     #1 JAYDA on CKD 3B -in the setting of GI losses and poor p.o. intake.  Urine output has decreased.  Will order renal ultrasound and bladder scan to rule out any obstruction but suspect this is all just from dehydration.  Currently no immediate need for dialysis.  On room air.  No swelling.  Normal potassium.  Patient was given 500 cc  bolus of fluid in the ER.  Given concurrent acidosis we will start the patient on bicarb fluids at 75 an hour and monitor BMPs every 4 hours.  Nephrology consult in the a.m.    #2 metabolic acidosis -in the setting of poor p.o. and renal failure.  CO2 is 17 with a gap of 17.  As above bicarb fluids and monitor BMPs every 4 hours.  Stop bicarb fluids when CO2 improves.    #3 diarrhea -nonbloody.  No abdominal pain.  Normal abdominal exam.  No sick contacts.  Will check a GI PCR.  Does have a small white count of 13 but suspect volume contraction at this time rather than colitis or diverticulitis which remain in the differential.  Repeat CBC with resuscitation and monitor for fevers.  Hold off on abdominal CT or antibiotics at this time but team will need to reevaluate for imaging as needed based on clinical course.    #4 hypomagnesemia -was replaced by the ER prior to admit.  Recheck lab in the morning.    #5 leukocytosis/thrombocytopenia/history MGUS -patient had CBC abnormalities prior including thrombocytopenia given her history.  Suspect her white count elevation currently is in the setting of volume contraction but will monitor for fevers or signs of infection.  Repeat CBC in the a.m.    #6 essential hypertension -hold lisinopril/HCTZ given her JAYDA and need for hydration.  Will add IV labetalol as needed for pressures greater than 160.  Monitor for meds as needed during stay.        Medical Decision Making  Number and Complexity of problems: 4-5 acute, multiple chronic  Differential Diagnosis: As above    Conditions and Status        New to me.  Interactive with no abdominal pain.  Abdominal exam is benign.  Appears nontoxic.  Significant renal dysfunction and being admitted for further care     MDM Data  External documents reviewed: None  Cardiac tracing (EKG, telemetry) interpretation: Was sinus on ER monitor  Radiology interpretation: Chest x-ray report reviewed  Labs reviewed: As above  Any tests that were  considered but not ordered: None     Decision rules/scores evaluated (example [id number], Wells, etc): None     Discussed with: Patient, daughter, ER provider     Care Planning  Shared decision making: Patient apprised of current labs, vitals, imaging and treatment plan.  They are agreeable with proceeding with plans as discussed.    Code status and discussions: DNR but full care including intubation otherwise    Disposition  Social Determinants of Health that impact treatment or disposition: none  Estimated length of stay is 2+ days.     I confirmed that the patient's advanced care plan is present, code status is documented, and a surrogate decision maker is listed in the patient's medical record.     The patient's surrogate decision maker is her daughter Clare.     The patient was seen and examined by me on 7/3/25 at 11:15 PM.    Electronically signed by Reid King DO, 07/03/25, 23:39 CDT.

## 2025-07-04 NOTE — ED PROVIDER NOTES
Subjective   History of Present Illness  Patient is brought to emergency room by ambulance with a complaint from her most of diarrhea that she says has been going on for for 5 days.  She saw her regular physician and was given something that we believe is Zofran.  When I asked her about that she did have some nausea and vomiting also.  There is been no blood in the vomitus or diarrhea.  She denies any abdominal pain but just generally feels bad she does have a history of chronic kidney disease.  At the problem by her family she does complain of some cough and congestion also but says she is not producing any of that.  She just feels chilled but no definite fever.    History provided by:  Patient   used: No    Diarrhea  The primary symptoms include vomiting and diarrhea. The illness began 3 to 5 days ago. The problem has not changed since onset.  The illness is also significant for chills. The illness does not include anorexia, dysphagia, odynophagia, bloating, constipation, tenesmus, back pain or itching. Associated medical issues do not include inflammatory bowel disease, GERD, gallstones, liver disease, alcohol abuse, PUD, gastric bypass, bowel resection, irritable bowel syndrome, hemorrhoids or diverticulitis.       Review of Systems   Constitutional:  Positive for chills.   HENT: Negative.     Respiratory:  Positive for cough.    Gastrointestinal:  Positive for diarrhea and vomiting. Negative for anorexia, bloating, constipation and dysphagia.   Genitourinary: Negative.    Musculoskeletal:  Negative for back pain.   Skin:  Negative for itching.   Neurological: Negative.    Psychiatric/Behavioral: Negative.     All other systems reviewed and are negative.      Past Medical History:   Diagnosis Date    Anxiety     CKD (chronic kidney disease) stage 3, GFR 30-59 ml/min     Depression     Hypertension     Seasonal allergies        Allergies   Allergen Reactions    Wellbutrin [Bupropion] GI  Intolerance     constipation    Neosporin [Neomycin-Bacitracin Zn-Polymyx] Rash    Penicillins Rash       Past Surgical History:   Procedure Laterality Date    APPENDECTOMY       SECTION      CHOLECYSTECTOMY      KNEE SURGERY         Family History   Problem Relation Age of Onset    Cancer Mother         non-hodgkins lymphoma    Heart disease Father     Cancer Father         leukemia    No Known Problems Brother     No Known Problems Sister     No Known Problems Daughter     No Known Problems Son     No Known Problems Maternal Grandmother     No Known Problems Paternal Grandmother     No Known Problems Maternal Aunt     No Known Problems Paternal Aunt     BRCA 1/2 Neg Hx     Breast cancer Neg Hx     Colon cancer Neg Hx     Endometrial cancer Neg Hx     Ovarian cancer Neg Hx     Uterine cancer Neg Hx        Social History     Socioeconomic History    Marital status:    Tobacco Use    Smoking status: Never     Passive exposure: Never    Smokeless tobacco: Never   Vaping Use    Vaping status: Never Used   Substance and Sexual Activity    Alcohol use: No    Drug use: No    Sexual activity: Never       Prior to Admission medications    Medication Sig Start Date End Date Taking? Authorizing Provider   allopurinol (ZYLOPRIM) 100 MG tablet Take 1 tablet by mouth Daily. 25  Yes Michael Singleton,    Ascorbic Acid (VITAMIN C GUMMIE PO) Take 3 tablets by mouth Daily.   Yes Petey Rosa MD   calcium carb-cholecalciferol (Calcium 600+D3) 600-10 MG-MCG tablet per tablet Take 1 tablet by mouth Daily.   Yes Petey Rosa MD   cetirizine (zyrTEC) 10 MG tablet Take 1 tablet by mouth Daily. Obtain OTC 3/19/24  Yes Aarti Smith APRN   Cholecalciferol (VITAMIN D3 PO) Take 1 tablet by mouth Daily.   Yes Petey Rosa MD   dapagliflozin Propanediol (Farxiga) 10 MG tablet Take 10 mg by mouth Daily. 25  Yes Michael Singleton,    escitalopram (LEXAPRO) 20 MG tablet Take 1 tablet by  mouth Daily. 7/26/24  Yes Oly Fishman APRN   lisinopril-hydrochlorothiazide (Zestoretic) 20-25 MG per tablet Take 1 tablet by mouth Daily. 1/28/25  Yes Michael Singleton,    Magnesium 250 MG tablet Take 2 tablets by mouth Daily.   Yes Provider, MD Petey   ondansetron (ZOFRAN) 4 MG tablet Take 1 tablet by mouth Every 4 (Four) Hours As Needed for Nausea or Vomiting. 7/2/25  Yes Baldomero Melendez MD       Medications   sodium chloride 0.9 % flush 10 mL (has no administration in time range)   sodium chloride 0.9 % flush 10 mL (has no administration in time range)   sodium chloride 0.9 % bolus 500 mL (0 mL Intravenous Stopped 7/3/25 2304)   magnesium sulfate 2g/50 mL (PREMIX) infusion (2 g Intravenous New Bag 7/3/25 2232)       Vitals:    07/03/25 2316   BP: 135/47   Pulse: 66   Resp: 20   Temp: 99.5 °F (37.5 °C)   SpO2: 94%         Objective   Physical Exam  Vitals and nursing note reviewed.   Constitutional:       Appearance: Normal appearance.   HENT:      Head: Normocephalic and atraumatic.      Mouth/Throat:      Comments: Poor dentition.  Eyes:      Extraocular Movements: Extraocular movements intact.      Pupils: Pupils are equal, round, and reactive to light.   Cardiovascular:      Rate and Rhythm: Normal rate and regular rhythm.   Pulmonary:      Effort: Pulmonary effort is normal.      Breath sounds: Normal breath sounds.   Abdominal:      General: Bowel sounds are normal.      Palpations: Abdomen is soft.   Musculoskeletal:         General: Normal range of motion.      Cervical back: Normal range of motion and neck supple.   Skin:     General: Skin is warm and dry.   Neurological:      General: No focal deficit present.      Mental Status: She is alert and oriented to person, place, and time.   Psychiatric:         Mood and Affect: Mood normal.         Behavior: Behavior normal.         Procedures         Lab Results (last 24 hours)       Procedure Component Value Units Date/Time    COVID  PRE-OP / PRE-PROCEDURE SCREENING ORDER (NO ISOLATION) - Swab, Nasopharynx [[id number]]  (Normal) Collected: 07/03/25 2130    Specimen: Swab from Nasopharynx Updated: 07/03/25 2220    Narrative:      The following orders were created for panel order COVID PRE-OP / PRE-PROCEDURE SCREENING ORDER (NO ISOLATION) - Swab, Nasopharynx.  Procedure                               Abnormality         Status                     ---------                               -----------         ------                     COVID-19 + RSV PCR - Swa...[[id number]]  Normal              Final result                 Please view results for these tests on the individual orders.    COVID-19 + RSV PCR - Swab, Nasopharynx [[id number]]  (Normal) Collected: 07/03/25 2130    Specimen: Swab from Nasopharynx Updated: 07/03/25 2220     COVID19 Not Detected     RSV, PCR Not Detected    CBC & Differential [[id number]]  (Abnormal) Collected: 07/03/25 2155    Specimen: Blood Updated: 07/03/25 2222    Narrative:      The following orders were created for panel order CBC & Differential.  Procedure                               Abnormality         Status                     ---------                               -----------         ------                     CBC Auto Differential[[id number]]        Abnormal            Final result                 Please view results for these tests on the individual orders.    Comprehensive Metabolic Panel [[id number]]  (Abnormal) Collected: 07/03/25 2155    Specimen: Blood Updated: 07/03/25 2218     Glucose 158 mg/dL      BUN 70.8 mg/dL      Creatinine 3.87 mg/dL      Sodium 135 mmol/L      Potassium 3.7 mmol/L      Chloride 101 mmol/L      CO2 17.0 mmol/L      Calcium 8.5 mg/dL      Total Protein 6.7 g/dL      Albumin 2.8 g/dL      ALT (SGPT) 16 U/L      AST (SGOT) 30 U/L      Alkaline Phosphatase 99 U/L      Total Bilirubin 0.8 mg/dL      Globulin 3.9 gm/dL      A/G Ratio 0.7 g/dL      BUN/Creatinine Ratio 18.3     Anion Gap 17.0  mmol/L      eGFR 11.7 mL/min/1.73     Narrative:      GFR Categories in Chronic Kidney Disease (CKD)              GFR Category          GFR (mL/min/1.73)    Interpretation  G1                    90 or greater        Normal or high (1)  G2                    60-89                Mild decrease (1)  G3a                   45-59                Mild to moderate decrease  G3b                   30-44                Moderate to severe decrease  G4                    15-29                Severe decrease  G5                    14 or less           Kidney failure    (1)In the absence of evidence of kidney disease, neither GFR category G1 or G2 fulfill the criteria for CKD.    eGFR calculation 2021 CKD-EPI creatinine equation, which does not include race as a factor    Magnesium [[id number]]  (Abnormal) Collected: 07/03/25 2155    Specimen: Blood Updated: 07/03/25 2218     Magnesium 1.2 mg/dL     CBC Auto Differential [[id number]]  (Abnormal) Collected: 07/03/25 2155    Specimen: Blood Updated: 07/03/25 2222     WBC 13.12 10*3/mm3      RBC 3.61 10*6/mm3      Hemoglobin 11.4 g/dL      Hematocrit 33.3 %      MCV 92.2 fL      MCH 31.6 pg      MCHC 34.2 g/dL      RDW 12.9 %      RDW-SD 43.3 fl      MPV 10.5 fL      Platelets 98 10*3/mm3     Manual Differential [[id number]]  (Abnormal) Collected: 07/03/25 2155    Specimen: Blood Updated: 07/03/25 2222     Neutrophil % 86.1 %      Lymphocyte % 3.0 %      Monocyte % 5.0 %      Eosinophil % 0.0 %      Basophil % 0.0 %      Bands %  5.0 %      Atypical Lymphocyte % 1.0 %      Neutrophils Absolute 11.95 10*3/mm3      Lymphocytes Absolute 0.52 10*3/mm3      Monocytes Absolute 0.66 10*3/mm3      Eosinophils Absolute 0.00 10*3/mm3      Basophils Absolute 0.00 10*3/mm3      Polychromasia Slight/1+     Dohle Bodies Present     Vacuolated Neutrophils Slight/1+     Platelet Estimate Decreased            XR Chest 1 View   Final Result   Moderate pulmonary hypoventilation with evidence of mild   CHF.  Persistent opacities in the right lung base with obscuration of the   right hemidiaphragm, favored to represent atelectasis rather than   pneumonia but clinical correlation is recommended.       This report was signed and finalized on 7/3/2025 9:40 PM by Dr. Martin Forrester MD.              ED Course          MDM  Number of Diagnoses or Management Options  Diagnosis management comments: Patient's lab work does reveal an acute kidney injury on top of her chronic renal disease.  We will admit for IV fluids.  Otherwise she seems clinically fine.       Amount and/or Complexity of Data Reviewed  Clinical lab tests: ordered and reviewed  Tests in the radiology section of CPT®: ordered and reviewed  Decide to obtain previous medical records or to obtain history from someone other than the patient: yes  Discuss the patient with other providers: yes    Risk of Complications, Morbidity, and/or Mortality  Presenting problems: moderate  Diagnostic procedures: moderate  Management options: moderate    Patient Progress  Patient progress: stable        Final diagnoses:   JAYDA (acute kidney injury)          Graham Terry Jr., MD  07/03/25 2923

## 2025-07-04 NOTE — PROGRESS NOTES
Patient Name: Sarah Littlejohn  Date of Admission: 7/3/2025  Today's Date: 07/04/25  Length of Stay: 1  Primary Care Physician: Michael Singleton DO    Subjective   Chief Complaint: Weakness, diarrhea  Cough  Associated symptoms: headaches    Diarrhea   Associated symptoms include coughing and headaches.      Sarah Littlejohn is a 75-year-old female with a history of CKD 3B, hypertension, MGUS who follows with nephrology and hematology.  She presents to the ER today for evaluation of persistent diarrhea with occasional nausea and vomiting for the last 4 to 5 days.  She is becoming increasingly weak generally but nonfocal.  She has been unable to eat or drink much of anything for the last 4-5 days.  She had some chills earlier today but no fevers.  She also feels like her urine output has been decreasing but she did make some urine earlier today.  No dysuria or pelvic pain.  She denies any abdominal pain.  No hematemesis or hematochezia.  No chest pain or shortness of breath.  She denies any sick contacts.  Vitals fairly stable on arrival to the ER.  Normal pulse and blood pressure.  On room air.  No significant fevers noted.  On labs however patient was found to have a creatinine of 3.87 with a GFR of 11.7.  Baseline is around 1.3 and 40.  Also had a magnesium of 1.2.  She was given small amount of IV fluids.  We have been asked to admit for further ongoing care.      Today:  This morning patient is resting comfortably in bed on room air with her daughter at bedside.  Patient is alert and oriented able to participate in assessment.  Patient states she has had no overnight nausea, vomiting or diarrhea.  She states she has some mild abdominal discomfort but no significant abdominal pain.  Patient was placed on bicarb gtt. per admitting physician due to metabolic acidosis, no significant improvement to CO2 or creatinine but will repeat CMP at noon for evaluation.  Nephrology consultation remains pending.   JAYDA workup initiated per hospitalist staff to include renal ultrasound performed last evening and laboratory data.  Awaiting full review by Dr. Weinstein.     Documented weights    07/03/25 2111 07/04/25 0000   Weight: 117 kg (258 lb) 117 kg (258 lb)          Intake/Output Summary (Last 24 hours) at 7/4/2025 1027  Last data filed at 7/4/2025 1007  Gross per 24 hour   Intake 716.25 ml   Output 100 ml   Net 616.25 ml       Results for orders placed during the hospital encounter of 12/13/21    Adult Transthoracic Echo Complete W/ Cont if Necessary Per Protocol 12/13/2021 10:08 AM    Interpretation Summary  · Left ventricular diastolic function is consistent with age.  · Left ventricular ejection fraction appears to be 61 - 65%. Left ventricular systolic function is normal.  · Normal right ventricular cavity size and systolic function noted.  · There were no significant (greater than mild) valvular dysfunction.       Review of Systems   Respiratory:  Positive for cough.    Gastrointestinal:  Positive for diarrhea.   Neurological:  Positive for headaches.      All pertinent negatives and positives are as above. All other systems have been reviewed and are negative unless otherwise stated.     Objective    Temp:  [98.1 °F (36.7 °C)-99.5 °F (37.5 °C)] 99.1 °F (37.3 °C)  Heart Rate:  [52-92] 69  Resp:  [16-20] 16  BP: (128-149)/(40-80) 137/50  Physical Exam  Vitals and nursing note reviewed.   Constitutional:       General: She is not in acute distress.     Appearance: She is morbidly obese. She is ill-appearing.      Comments: Room air   HENT:      Head: Normocephalic and atraumatic.      Right Ear: Tympanic membrane normal.      Left Ear: Tympanic membrane normal.      Nose: Nose normal.      Mouth/Throat:      Mouth: Mucous membranes are moist.      Pharynx: Oropharynx is clear.   Cardiovascular:      Rate and Rhythm: Normal rate and regular rhythm.      Comments: Overnight telemetry SB/S 50-6 7  Pulmonary:      Effort:  Pulmonary effort is normal.      Breath sounds: Normal breath sounds.   Abdominal:      General: Abdomen is protuberant.      Palpations: Abdomen is soft.      Tenderness: There is no abdominal tenderness.   Musculoskeletal:      Cervical back: Normal range of motion and neck supple. No rigidity or tenderness.      Right lower leg: No edema.      Left lower leg: No edema.   Skin:     General: Skin is warm.      Coloration: Skin is pale.   Neurological:      General: No focal deficit present.      Mental Status: She is alert and oriented to person, place, and time.   Psychiatric:         Attention and Perception: Attention normal.         Mood and Affect: Affect is flat.         Speech: Speech normal.         Behavior: Behavior is cooperative.         Cognition and Memory: Cognition and memory normal.       Results Review:  I have reviewed the labs, radiology results, and diagnostic studies.    Laboratory Data:   Results from last 7 days   Lab Units 07/03/25  2155   WBC 10*3/mm3 13.12*   HEMOGLOBIN g/dL 11.4*   HEMATOCRIT % 33.3*   PLATELETS 10*3/mm3 98*        Results from last 7 days   Lab Units 07/04/25  0431 07/03/25  2155   SODIUM mmol/L 135* 135*   POTASSIUM mmol/L 3.4* 3.7   CHLORIDE mmol/L 104 101   CO2 mmol/L 18.0* 17.0*   BUN mg/dL 71.4* 70.8*   CREATININE mg/dL 3.75* 3.87*   CALCIUM mg/dL 8.1* 8.5*   BILIRUBIN mg/dL  --  0.8   ALK PHOS U/L  --  99   ALT (SGPT) U/L  --  16   AST (SGOT) U/L  --  30   GLUCOSE mg/dL 152* 158*       Culture Data:     Microbiology Results (last 10 days)       Procedure Component Value - Date/Time    COVID PRE-OP / PRE-PROCEDURE SCREENING ORDER (NO ISOLATION) - Swab, Nasopharynx [[id number]]  (Normal) Collected: 07/03/25 2130    Lab Status: Final result Specimen: Swab from Nasopharynx Updated: 07/03/25 2220    Narrative:      The following orders were created for panel order COVID PRE-OP / PRE-PROCEDURE SCREENING ORDER (NO ISOLATION) - Swab, Nasopharynx.  Procedure                                Abnormality         Status                     ---------                               -----------         ------                     COVID-19 + RSV PCR - Swa...[[id number]]  Normal              Final result                 Please view results for these tests on the individual orders.    COVID-19 + RSV PCR - Swab, Nasopharynx [[id number]]  (Normal) Collected: 07/03/25 2130    Lab Status: Final result Specimen: Swab from Nasopharynx Updated: 07/03/25 2220     COVID19 Not Detected     RSV, PCR Not Detected             Radiology Data:   Imaging Results (Last 7 Days)       Procedure Component Value Units Date/Time    US Renal Bilateral [[id number]] Collected: 07/04/25 0620     Updated: 07/04/25 0626    Narrative:      EXAMINATION: US RENAL BILATERAL-  7/4/2025 6:20 AM     REASON FOR EXAM: panda on ckd, r/o obstruction; N17.9-Acute kidney  failure, unspecified     COMPARISON: None       TECHNIQUE: Multiple longitudinal and transverse realtime sonographic  images of the kidneys and urinary bladder are obtained. Gray scale and  color Doppler images were provided. Report and images stored per  institutional and state regulations.     FINDINGS:     RIGHT:  The right kidney measures 12.2 x 4.2 x 5.8 cm. No hydronephrosis. At the  superior pole of the RIGHT kidney there is an anechoic cyst measuring up  to 5.2 x 5.7 x 5.8 cm. No aggressive features. In the interpolar region  of the RIGHT kidney there is a similar 2.9 x 3.1 x 3.9 cm simple cyst.  Questionable calculi also noted in the RIGHT kidney. I suspect mildly  increased renal cortical echogenicity.     LEFT:  The left kidney measures 13 x 4.9 x 4.6 cm. No hydronephrosis. No focal  parenchymal lesion. Slight renal cortical thinning and probable  scattered tiny calculi.     OTHER: IVC and aorta are largely obscured by bowel gas. Partially  decompressed urinary bladder.          Impression:         1.  No hydronephrosis. There are tiny bilateral  calcifications  suggestive of renal calculi.     2.  Bilateral renal cysts.     3.  Possible cortical thinning bilaterally and some mild increased renal  parenchymal echogenicity on the RIGHT may suggest chronic renal  parenchymal disease.                 This report was signed and finalized on 7/4/2025 6:23 AM by Dr. Ashok Odell MD.       XR Chest 1 View [[id number]] Collected: 07/03/25 2137     Updated: 07/03/25 2143    Narrative:      EXAMINATION: XR CHEST 1 VW- 7/3/2025 9:37 PM     HISTORY: cough.     REPORT: Frontal view of the chest was obtained.     COMPARISON: Chest x-ray 3/17/2024.     The lungs are moderately hypoaerated, there is central basilar vascular  congestion and the heart is enlarged. The right hemidiaphragm is  obscured as before, this may be related to atelectasis, though  underlying right basilar pneumonia cannot be excluded with this  appearance. No pneumothorax is identified. There may be a small right  pleural effusion. No acute osseous abnormality.       Impression:      Moderate pulmonary hypoventilation with evidence of mild  CHF. Persistent opacities in the right lung base with obscuration of the  right hemidiaphragm, favored to represent atelectasis rather than  pneumonia but clinical correlation is recommended.     This report was signed and finalized on 7/3/2025 9:40 PM by Dr. Martin Forrester MD.                I have reviewed the patient's current medications.     cefTRIAXone, 2,000 mg, Intravenous, Q24H  fluticasone, 2 spray, Each Nare, Daily  heparin (porcine), 5,000 Units, Subcutaneous, Q12H  sodium chloride, 10 mL, Intravenous, Q12H            Assessment/Plan   Assessment  Active Hospital Problems    Diagnosis     **Acute renal failure superimposed on stage 3b chronic kidney disease     Cystitis     Diarrhea     Metabolic acidosis     Hypomagnesemia     MGUS (monoclonal gammopathy of unknown significance)     Primary hypertension        Treatment Plan  Acute kidney failure  superimposed on stage 3b CKD  -In the setting of GI losses and poor p.o. intake.  Decreased urinary output.  -Nephrology consult pending.  - Patient was given a 500 cc bolus fluid in the ER, given current acidosis and no improvement in the first 12 hours we will continue bicarb fluids at 75 mL/HR and continue to monitor BMPs Q4.  - Continue to hold all nephrotoxic agents.  - JAYDA laboratory evaluation collected and pending.    Metabolic acidosis  -Poor p.o. intake and renal failure, CO2 was 17 on admission with a gap of 17.  - Continue bicarb fluid and BMPs every 4.  - Currently CO2 18 with a closed gap.    Hypomagnesemia  - Mg 1.2 upon arrival, replaced with 2 g per ED this morning stable at 2.0  - Mg daily    Leukocytosis/thrombocytopenia/history of MGUS  -Thrombocytopenia acute on chronic.  - Obtained morning to evaluate for leukocytosis, 4+ bacteria initiated Rocephin.  - Repeat CBC in the AM.    Hypertension  - Initiate every 4 vital signs.  - Holding lisinopril HCTZ given AKF and need for hydration.  - Continue labetalol as needed for SBP greater than 160    Cystitis  - Initiate Rocephin 2 g every 24.  - Continue to monitor urine culture.    T EE prophylaxis with SCDs  Labs in a.m.  Medical Decision Making  3 acute, high complexity, unchanged  1 acute on chronic, high complexity, unchanged  4 chronic, moderate complexity, unchanged    Number and Complexity of problems: 7  Differential Diagnosis: None    Conditions and Status        Condition is unchanged.     [facility] Data  External documents reviewed: [employer]  Cardiac tracing (EKG, telemetry) interpretation: 7/3/2025 EKG per cardiology reviewed  Radiology interpretation: 7/3/2025 chest x-ray and renal ultrasound per radiology reviewed  Labs reviewed: 7/4/2025 reviewed  Any tests that were considered but not ordered: None     Decision rules/scores evaluated (example [id number], Wells, etc): None     Discussed with: Dr. Buitrago, patient and her daughter Clare      Care Planning  Shared decision making: Dr. Buitrago, patient and her daughter Clare  Code status and discussions: Patient  Surrogate Decision Maker her daughter Clare    Disposition  Social Determinants of Health that impact treatment or disposition: None determined at this time  I expect the patient to be discharged to home in 1-2 days.     Electronically signed by SARAHY Perkins, 07/04/25, 10:27 CDT.

## 2025-07-04 NOTE — CASE MANAGEMENT/SOCIAL WORK
Discharge Planning Assessment  [facility]     Patient Name: Sarah Littlejohn  MRN: [id number]  Today's Date: 7/4/2025    Admit Date: 7/3/2025        Discharge Needs Assessment       Row Name 07/04/25 1411       Living Environment    People in Home spouse    Current Living Arrangements home    Potentially Unsafe Housing Conditions none    Primary Care Provided by self    Provides Primary Care For no one    Family Caregiver if Needed spouse    Quality of Family Relationships helpful;involved;supportive    Able to Return to Prior Arrangements yes       Resource/Environmental Concerns    Resource/Environmental Concerns none    Transportation Concerns none       Transition Planning    Patient/Family Anticipates Transition to home    Patient/Family Anticipated Services at Transition none    Transportation Anticipated family or friend will provide       Discharge Needs Assessment    Equipment Currently Used at Home none    Anticipated Changes Related to Illness none    Equipment Needed After Discharge none    Discharge Coordination/Progress PT lives with spouse, and plans to return home at d/c. Pt did not use DME prior to coming to hospital amd is not requesting any at this time. Pt is able to transport ownself to/from appointments. No needs identified                   Discharge Plan    No documentation.                 Continued Care and Services - Admitted Since 7/3/2025    No active coordination exists.          Demographic Summary    No documentation.                  Functional Status    No documentation.                  Psychosocial    No documentation.                  Abuse/Neglect    No documentation.                  Legal    No documentation.                  Substance Abuse    No documentation.                  Patient Forms    No documentation.                     Saman Guerrero

## 2025-07-05 LAB
25(OH)D3 SERPL-MCNC: 23.8 NG/ML (ref 30–100)
25(OH)D3 SERPL-MCNC: 28.3 NG/ML (ref 30–100)
ALBUMIN SERPL-MCNC: 2.5 G/DL (ref 3.5–5.2)
ALBUMIN UR-MCNC: 18.5 MG/DL
ALBUMIN/GLOB SERPL: 0.7 G/DL
ALP SERPL-CCNC: 98 U/L (ref 39–117)
ALT SERPL W P-5'-P-CCNC: 17 U/L (ref 1–33)
ANION GAP SERPL CALCULATED.3IONS-SCNC: 12 MMOL/L (ref 5–15)
AST SERPL-CCNC: 27 U/L (ref 1–32)
BASOPHILS # BLD AUTO: 0.03 10*3/MM3 (ref 0–0.2)
BASOPHILS NFR BLD AUTO: 0.3 % (ref 0–1.5)
BILIRUB SERPL-MCNC: 0.6 MG/DL (ref 0–1.2)
BUN SERPL-MCNC: 64.4 MG/DL (ref 8–23)
BUN/CREAT SERPL: 20.1 (ref 7–25)
CALCIUM SPEC-SCNC: 8 MG/DL (ref 8.6–10.5)
CHLORIDE SERPL-SCNC: 106 MMOL/L (ref 98–107)
CO2 SERPL-SCNC: 20 MMOL/L (ref 22–29)
CREAT SERPL-MCNC: 3.2 MG/DL (ref 0.57–1)
CREAT UR-MCNC: 46.4 MG/DL
CREAT UR-MCNC: 62.1 MG/DL
DEPRECATED RDW RBC AUTO: 45.1 FL (ref 37–54)
EGFRCR SERPLBLD CKD-EPI 2021: 14.7 ML/MIN/1.73
EOSINOPHIL # BLD AUTO: 0.07 10*3/MM3 (ref 0–0.4)
EOSINOPHIL NFR BLD AUTO: 0.6 % (ref 0.3–6.2)
EOSINOPHIL SPEC QL MICRO: 0 % EOS/100 CELLS (ref 0–0)
ERYTHROCYTE [DISTWIDTH] IN BLOOD BY AUTOMATED COUNT: 13 % (ref 12.3–15.4)
GLOBULIN UR ELPH-MCNC: 3.6 GM/DL
GLUCOSE SERPL-MCNC: 84 MG/DL (ref 65–99)
HCT VFR BLD AUTO: 30.9 % (ref 34–46.6)
HGB BLD-MCNC: 10.1 G/DL (ref 12–15.9)
IMM GRANULOCYTES # BLD AUTO: 0.15 10*3/MM3 (ref 0–0.05)
IMM GRANULOCYTES NFR BLD AUTO: 1.3 % (ref 0–0.5)
LYMPHOCYTES # BLD AUTO: 1.07 10*3/MM3 (ref 0.7–3.1)
LYMPHOCYTES NFR BLD AUTO: 9.4 % (ref 19.6–45.3)
MCH RBC QN AUTO: 30.8 PG (ref 26.6–33)
MCHC RBC AUTO-ENTMCNC: 32.7 G/DL (ref 31.5–35.7)
MCV RBC AUTO: 94.2 FL (ref 79–97)
MICROALBUMIN/CREAT UR: 297.9 MG/G (ref 0–29)
MONOCYTES # BLD AUTO: 1.43 10*3/MM3 (ref 0.1–0.9)
MONOCYTES NFR BLD AUTO: 12.5 % (ref 5–12)
NEUTROPHILS NFR BLD AUTO: 75.9 % (ref 42.7–76)
NEUTROPHILS NFR BLD AUTO: 8.68 10*3/MM3 (ref 1.7–7)
PLATELET # BLD AUTO: 86 10*3/MM3 (ref 140–450)
PMV BLD AUTO: 13.2 FL (ref 6–12)
POTASSIUM SERPL-SCNC: 4 MMOL/L (ref 3.5–5.2)
PROT SERPL-MCNC: 6.1 G/DL (ref 6–8.5)
RBC # BLD AUTO: 3.28 10*6/MM3 (ref 3.77–5.28)
SODIUM SERPL-SCNC: 138 MMOL/L (ref 136–145)
WBC NRBC COR # BLD AUTO: 11.43 10*3/MM3 (ref 3.4–10.8)

## 2025-07-05 PROCEDURE — 82043 UR ALBUMIN QUANTITATIVE: CPT | Performed by: INTERNAL MEDICINE

## 2025-07-05 PROCEDURE — 25010000002 ONDANSETRON PER 1 MG: Performed by: INTERNAL MEDICINE

## 2025-07-05 PROCEDURE — 25010000002 SODIUM CHLORIDE 0.9 % WITH KCL 20 MEQ 20-0.9 MEQ/L-% SOLUTION: Performed by: INTERNAL MEDICINE

## 2025-07-05 PROCEDURE — 82570 ASSAY OF URINE CREATININE: CPT | Performed by: INTERNAL MEDICINE

## 2025-07-05 PROCEDURE — 25010000002 CEFTRIAXONE PER 250 MG

## 2025-07-05 PROCEDURE — 25010000002 NA FERRIC GLUC CPLX PER 12.5 MG: Performed by: INTERNAL MEDICINE

## 2025-07-05 PROCEDURE — 25810000003 SODIUM CHLORIDE 0.9 % SOLUTION: Performed by: INTERNAL MEDICINE

## 2025-07-05 PROCEDURE — 85025 COMPLETE CBC W/AUTO DIFF WBC: CPT

## 2025-07-05 PROCEDURE — 25010000002 HEPARIN (PORCINE) PER 1000 UNITS: Performed by: INTERNAL MEDICINE

## 2025-07-05 PROCEDURE — 80053 COMPREHEN METABOLIC PANEL: CPT

## 2025-07-05 PROCEDURE — 82306 VITAMIN D 25 HYDROXY: CPT

## 2025-07-05 RX ORDER — ALLOPURINOL 100 MG/1
100 TABLET ORAL DAILY
Status: DISCONTINUED | OUTPATIENT
Start: 2025-07-05 | End: 2025-07-08 | Stop reason: HOSPADM

## 2025-07-05 RX ADMIN — BUTALBITAL, ACETAMINOPHEN, AND CAFFEINE 1 TABLET: 50; 325; 40 TABLET ORAL at 05:25

## 2025-07-05 RX ADMIN — Medication 10 ML: at 08:38

## 2025-07-05 RX ADMIN — CEFTRIAXONE 2000 MG: 2 INJECTION, POWDER, FOR SOLUTION INTRAMUSCULAR; INTRAVENOUS at 12:57

## 2025-07-05 RX ADMIN — HEPARIN SODIUM 5000 UNITS: 5000 INJECTION INTRAVENOUS; SUBCUTANEOUS at 08:38

## 2025-07-05 RX ADMIN — SODIUM CHLORIDE 250 MG: 9 INJECTION, SOLUTION INTRAVENOUS at 08:38

## 2025-07-05 RX ADMIN — Medication 10 ML: at 20:10

## 2025-07-05 RX ADMIN — ESCITALOPRAM 20 MG: 10 TABLET, FILM COATED ORAL at 08:37

## 2025-07-05 RX ADMIN — ONDANSETRON 4 MG: 2 INJECTION INTRAMUSCULAR; INTRAVENOUS at 12:26

## 2025-07-05 RX ADMIN — BUTALBITAL, ACETAMINOPHEN, AND CAFFEINE 1 TABLET: 50; 325; 40 TABLET ORAL at 12:54

## 2025-07-05 RX ADMIN — HEPARIN SODIUM 5000 UNITS: 5000 INJECTION INTRAVENOUS; SUBCUTANEOUS at 20:10

## 2025-07-05 RX ADMIN — ONDANSETRON 4 MG: 2 INJECTION INTRAMUSCULAR; INTRAVENOUS at 20:10

## 2025-07-05 RX ADMIN — SODIUM CHLORIDE AND POTASSIUM CHLORIDE 125 ML/HR: 150; 900 INJECTION, SOLUTION INTRAVENOUS at 12:58

## 2025-07-05 RX ADMIN — ALLOPURINOL 100 MG: 100 TABLET ORAL at 12:54

## 2025-07-05 NOTE — PROGRESS NOTES
[facility]  INPATIENT PROGRESS NOTE    Patient Name: Sarah Littlejohn  Date of Admission: 7/3/2025  Today's Date: 07/05/25  Length of Stay: 2  Primary Care Physician: Michael Singleton DO    Subjective   Chief Complaint: Follow-up  HPI   Appreciate input from consultant    Patient came in with weakness and diarrhea of 4 to 5 days duration.  She carries history of IgM MGUS, hypertension and CKD 3B with baseline creatinine around 1.3.  She also has hyperuricemia to which she typically uses allopurinol based on record reviewed.  Patient's creatinine was 3.87.  Magnesium was low at 1.2.  Patient has anemia, thrombocytopenia.  She follows with TABBY Mendes in outpatient setting patient was last seen on April 30 by him.  Platelet count at that time was 221 while hemoglobin was 12.2  Her next follow-up appointment is October 30, 2025    Still has loose bowel movement  Positive nausea but no vomiting  No abdominal pain    Review of Systems   All pertinent negatives and positives are as above. All other systems have been reviewed and are negative unless otherwise stated.     Objective    Temp:  [97.8 °F (36.6 °C)-99.6 °F (37.6 °C)] 97.8 °F (36.6 °C)  Heart Rate:  [51-67] 51  Resp:  [16-18] 18  BP: (122-144)/(37-88) 137/44  Physical Exam  Noted presence of a cup of fruit on her chest while nurse Bahman at bedside working on her IV access.  No distress  GEN: Awake, alert, interactive, in NAD  HEENT: Atraumatic, PERRLA, EOMI, Anicteric, Trachea midline  Lungs: CTAB, no wheezing/rales/rhonchi  Heart: RRR, +S1/s2, no rub  ABD: soft, nt/nd, +BS, no guarding/rebound  Extremities: atraumatic, no cyanosis, no edema  Skin: no rashes or lesions  Neuro: AAOx3, no focal deficits  Psych: normal mood & affect      Results Review:  I have reviewed the labs, radiology results, and diagnostic studies.    Laboratory Data:   Results from last 7 days   Lab Units 07/05/25  9240  07/04/25  1158 07/03/25  2155   WBC 10*3/mm3 11.43* 13.63* 13.12*   HEMOGLOBIN g/dL 10.1* 10.1* 11.4*   HEMATOCRIT % 30.9* 29.8* 33.3*   PLATELETS 10*3/mm3 86* 105* 98*        Results from last 7 days   Lab Units 07/05/25  0355 07/04/25  1648 07/04/25  1158 07/04/25  0431 07/03/25  2155   SODIUM mmol/L 138 135* 137   < > 135*   POTASSIUM mmol/L 4.0 3.5 3.2*   < > 3.7   CHLORIDE mmol/L 106 104 102   < > 101   CO2 mmol/L 20.0* 15.0* 20.0*   < > 17.0*   BUN mg/dL 64.4* 67.4* 68.6*   < > 70.8*   CREATININE mg/dL 3.20* 3.46* 3.47*   < > 3.87*   CALCIUM mg/dL 8.0* 8.2* 8.1*   < > 8.5*   BILIRUBIN mg/dL 0.6  --   --   --  0.8   ALK PHOS U/L 98  --   --   --  99   ALT (SGPT) U/L 17  --   --   --  16   AST (SGOT) U/L 27  --   --   --  30   GLUCOSE mg/dL 84 124* 135*   < > 158*    < > = values in this interval not displayed.       Culture Data:   Urine Culture   Date Value Ref Range Status   07/04/2025 >100,000 CFU/mL Escherichia coli (A)  Preliminary       Radiology Data:   Imaging Results (Last 24 Hours)       ** No results found for the last 24 hours. **            I have reviewed the patient's current medications.     Assessment/Plan   Assessment  Active Hospital Problems    Diagnosis     **Acute renal failure superimposed on stage 3b chronic kidney disease     Cystitis     Diarrhea     Metabolic acidosis     Hypomagnesemia     MGUS (monoclonal gammopathy of unknown significance)     Primary hypertension                  Medical Decision Making  Number and Complexity of problems:     Acute kidney injury superimposed on stage IIIb chronic kidney disease  Metabolic acidosis  Hypomagnesemia corrected  Leukocytosis, thrombocytopenia poole, history of IgM MGUS followed by TABBY Aparicio in outpatient setting  Hypertension  Concern for cystitis on antibiotic (E. coli)  Iron deficiency and likely underlying anemia of chronic disease/CKD; ferritin could be acting here as an inflammatory  marker  Hypokalemia-resolved  Hyperuricemia    Treatment Plan  Continue empiric antibiotic pending culture and susceptibility  Continue IV fluid hydrate  Initiated on iron as per nephrology service  25-hydroxy vitamin D, urine microalbumin/creatinine ratio pending; normal PTH at 55.2  Patient previously on lisinopril hydrochlorothiazide.  This are on hold.  Medications reviewed.  This is on hold due to acute kidney injury.  Monitor blood pressure.  Otherwise systolic blood pressures anywhere from 122-144.    Medications reviewed  cefTRIAXone, 2,000 mg, Intravenous, Q24H  escitalopram, 20 mg, Oral, Daily  ferric gluconate, 250 mg, Intravenous, Daily  fluticasone, 2 spray, Each Nare, Daily  heparin (porcine), 5,000 Units, Subcutaneous, Q12H  sodium chloride, 10 mL, Intravenous, Q12H          Conditions and Status  Fair     MDM Data  External documents reviewed: Reviewed record including home medication  Cardiac tracing (EKG, telemetry) interpretation: -  Radiology interpretation: y  Labs reviewed: y  Any tests that were considered but not ordered: none     Decision rules/scores evaluated (example [id number], Wells, etc): -     Discussed with: Patient and family     Care Planning  Shared decision making: Patient with family and consultant  Code status and discussions:     Disposition  Social Determinants of Health that impact treatment or disposition: None identified at this time  I expect the patient to be discharged to (?)        Electronically signed by Ricardo Buitrago MD, 07/05/25, 11:52 CDT.

## 2025-07-05 NOTE — PLAN OF CARE
Goal Outcome Evaluation:        Tmax 99.6 F oral.  Pt c/o HA, firocet given per order.  18G R wrist started due to iron infusion, infiltrating.  Pt able to ambulate to bathroom with standby assist.  Daughter at bedside.  Pt running NS with 20KCL at 125ml/hr.  Pt running SBR in upper 40s to 60's while asleep. Pt very eager to discharge home.

## 2025-07-05 NOTE — PROGRESS NOTES
Nephrology ([facility]) Progress Note      Patient:  Sarah Littlejohn  YOB: 1950  Date of Service: 7/5/2025  MRN: [id number]   Acct: [id number]   Primary Care Physician: Michael Singleton DO  Advance Directive:   Code Status and Medical Interventions: No CPR (Do Not Attempt to Resuscitate); Full Support   Ordered at: 07/04/25 0012     Code Status (Patient has no pulse and is not breathing):    No CPR (Do Not Attempt to Resuscitate)     Medical Interventions (Patient has pulse or is breathing):    Full Support     Level Of Support Discussed With:    Patient     Admit Date: 7/3/2025       Hospital Day: 2  Referring Provider: Reid King DO      Patient personally seen and examined.  Complete chart including Consults, Notes, Operative Reports, Labs, Cardiology, and Radiology studies reviewed as able.    Chief complaint: Abnormal labs.    Subjective:  Sarah Littlejohn is a 75 y.o. female  whom we were consulted for acute kidney injury/chronic kidney disease.  Patient has stage IIIb chronic kidney disease, follows Dr. Santiago.  Patient also has history of hypertension, MGUS, anemia and follows hematology.  Presented to the emergency room complaining of nausea vomiting and diarrhea.  She has noticed dizziness.  She was unable to drink or eat anything because of persistent vomiting.  Her urine output has significantly depressed dropped as well.  She decided come to the hospital.  In ER lab data indicated serum creatinine is 3.8 mg estimated GFR of 11 mL.  Her baseline creatinine usually 1.3 mg.  Hospital course markable for fluid resuscitation, has received bolus of IV fluids followed by maintenance IV fluid.  Patient has noticed large urine output.  Hospital course remarkable for volume resuscitation and has some improvement of renal function.    This afternoon she feels well.  She has noticed more urine output.  She had only 1 bowel movement which was  large and watery.  Her renal function is slightly better      Allergies:  Wellbutrin [bupropion], Neosporin [neomycin-bacitracin zn-polymyx], and Penicillins    Home Meds:  Medications Prior to Admission   Medication Sig Dispense Refill Last Dose/Taking    allopurinol (ZYLOPRIM) 100 MG tablet Take 1 tablet by mouth Daily. 90 tablet 1 Taking    Ascorbic Acid (VITAMIN C GUMMIE PO) Take 3 tablets by mouth Daily.   Taking    calcium carb-cholecalciferol (Calcium 600+D3) 600-10 MG-MCG tablet per tablet Take 1 tablet by mouth Daily.   Taking    cetirizine (zyrTEC) 10 MG tablet Take 1 tablet by mouth Daily. Obtain OTC   Taking    escitalopram (LEXAPRO) 20 MG tablet Take 1 tablet by mouth Daily. 90 tablet 3 Taking    lisinopril-hydrochlorothiazide (Zestoretic) 20-25 MG per tablet Take 1 tablet by mouth Daily. 90 tablet 1 Taking    Magnesium 250 MG tablet Take 2 tablets by mouth Daily.   Taking    ondansetron (ZOFRAN) 4 MG tablet Take 1 tablet by mouth Every 4 (Four) Hours As Needed for Nausea or Vomiting. 20 tablet 0        Medicines:  Current Facility-Administered Medications   Medication Dose Route Frequency Provider Last Rate Last Admin    acetaminophen (TYLENOL) tablet 650 mg  650 mg Oral Q4H PRN Reid King DO   650 mg at 07/04/25 0718    allopurinol (ZYLOPRIM) tablet 100 mg  100 mg Oral Daily Ricardo Buitrago MD   100 mg at 07/05/25 1254    butalbital-acetaminophen-caffeine (FIORICET, ESGIC) -40 MG per tablet 1 tablet  1 tablet Oral Q6H PRN Radha Hussein APRN   1 tablet at 07/05/25 1254    cefTRIAXone (ROCEPHIN) 2,000 mg in sodium chloride 0.9 % 100 mL MBP  2,000 mg Intravenous Q24H Radha Hussein APRN 200 mL/hr at 07/05/25 1257 2,000 mg at 07/05/25 1257    escitalopram (LEXAPRO) tablet 20 mg  20 mg Oral Daily Radha Hussein APRN   20 mg at 07/05/25 0837    ferric gluconate (FERRLECIT) 250 MG in sodium chloride 0.9% 250 mL IVPB  250 mg Intravenous Daily Ali, Sergio,  mL/hr at  25 0838 250 mg at 25 0838    fluticasone (FLONASE) 50 MCG/ACT nasal spray 2 spray  2 spray Each Nare Daily Radha Hussein APRN        heparin (porcine) 5000 UNIT/ML injection 5,000 Units  5,000 Units Subcutaneous Q12H Reid King, DO   5,000 Units at 25 0838    labetalol (NORMODYNE,TRANDATE) injection 10 mg  10 mg Intravenous Q6H PRN Reid King,         ondansetron (ZOFRAN) injection 4 mg  4 mg Intravenous Q6H PRN Reid King, DO   4 mg at 25 1226    sodium chloride 0.9 % flush 10 mL  10 mL Intravenous PRN Graham Terry Jr., MD        sodium chloride 0.9 % flush 10 mL  10 mL Intravenous PRN Graham Terry Jr., MD        sodium chloride 0.9 % flush 10 mL  10 mL Intravenous Q12H Reid King, DO   10 mL at 25 0838    sodium chloride 0.9 % flush 10 mL  10 mL Intravenous PRN Reid King,         sodium chloride 0.9 % infusion 40 mL  40 mL Intravenous PRN Reid King,         sodium chloride 0.9 % with KCl 20 mEq/L infusion  125 mL/hr Intravenous Continuous Sergio Weinstein  mL/hr at 25 1258 125 mL/hr at 25 1258       Past Medical History:  Past Medical History:   Diagnosis Date    Anxiety     CKD (chronic kidney disease) stage 3, GFR 30-59 ml/min     Depression     Hypertension     Seasonal allergies        Past Surgical History:  Past Surgical History:   Procedure Laterality Date    APPENDECTOMY       SECTION      CHOLECYSTECTOMY      KNEE SURGERY         Family History  Family History   Problem Relation Age of Onset    Cancer Mother         non-hodgkins lymphoma    Heart disease Father     Cancer Father         leukemia    No Known Problems Brother     No Known Problems Sister     No Known Problems Daughter     No Known Problems Son     No Known Problems Maternal Grandmother     No Known Problems Paternal Grandmother     No Known Problems Maternal Aunt     No Known Problems Paternal Aunt     BRCA 1/2 Neg Hx     Breast  cancer Neg Hx     Colon cancer Neg Hx     Endometrial cancer Neg Hx     Ovarian cancer Neg Hx     Uterine cancer Neg Hx        Social History  Social History     Socioeconomic History    Marital status:    Tobacco Use    Smoking status: Never     Passive exposure: Never    Smokeless tobacco: Never   Vaping Use    Vaping status: Never Used   Substance and Sexual Activity    Alcohol use: No    Drug use: No    Sexual activity: Never       Review of Systems:  History obtained from chart review and the patient  General ROS: No fever or chills  Respiratory ROS: No cough, shortness of breath, wheezing  Cardiovascular ROS: No chest pain or palpitations  Gastrointestinal ROS: No abdominal pain or melena  Genito-Urinary ROS: No dysuria or hematuria  Psych ROS: No anxiety and depression  14 point ROS reviewed with the patient and negative except as noted above and in the HPI unless unable to obtain.    Objective:  Patient Vitals for the past 24 hrs:   BP Temp Temp src Pulse Resp SpO2   07/05/25 1121 137/44 97.8 °F (36.6 °C) Oral 51 18 97 %   07/05/25 0720 (!) 122/37 98.7 °F (37.1 °C) Oral 57 18 92 %   07/05/25 0454 144/88 98.1 °F (36.7 °C) Oral 67 16 92 %   07/05/25 0011 128/49 98.7 °F (37.1 °C) Oral 52 16 96 %   07/04/25 1949 143/40 99.6 °F (37.6 °C) Oral 60 16 96 %   07/04/25 1610 -- 98.8 °F (37.1 °C) -- -- -- --   07/04/25 1502 136/44 98.2 °F (36.8 °C) Oral 56 16 96 %       Intake/Output Summary (Last 24 hours) at 7/5/2025 1434  Last data filed at 7/5/2025 0937  Gross per 24 hour   Intake 480 ml   Output --   Net 480 ml     General: awake/alert   HEENT: Normocephalic atraumatic head  Neck: Supple no JVD or carotid bruits.  Chest:  clear to auscultation bilaterally without respiratory distress  CVS: regular rate and rhythm  Abdominal: soft, nontender, positive bowel sounds  Extremities: no cyanosis or edema  Skin: warm and dry without rash      Labs:  Results from last 7 days   Lab Units 07/05/25  0355 07/04/25  1158  07/03/25 2155   WBC 10*3/mm3 11.43* 13.63* 13.12*   HEMOGLOBIN g/dL 10.1* 10.1* 11.4*   HEMATOCRIT % 30.9* 29.8* 33.3*   PLATELETS 10*3/mm3 86* 105* 98*         Results from last 7 days   Lab Units 07/05/25  0355 07/04/25  1648 07/04/25  1158 07/04/25  0431 07/03/25 2155   SODIUM mmol/L 138 135* 137   < > 135*   POTASSIUM mmol/L 4.0 3.5 3.2*   < > 3.7   CHLORIDE mmol/L 106 104 102   < > 101   CO2 mmol/L 20.0* 15.0* 20.0*   < > 17.0*   BUN mg/dL 64.4* 67.4* 68.6*   < > 70.8*   CREATININE mg/dL 3.20* 3.46* 3.47*   < > 3.87*   CALCIUM mg/dL 8.0* 8.2* 8.1*   < > 8.5*   EGFR mL/min/1.73 14.7* 13.4* 13.3*   < > 11.7*   BILIRUBIN mg/dL 0.6  --   --   --  0.8   ALK PHOS U/L 98  --   --   --  99   ALT (SGPT) U/L 17  --   --   --  16   AST (SGOT) U/L 27  --   --   --  30   GLUCOSE mg/dL 84 124* 135*   < > 158*    < > = values in this interval not displayed.       Radiology:   Imaging Results (Last 72 Hours)       Procedure Component Value Units Date/Time    US Renal Bilateral [[id number]] Collected: 07/04/25 0620     Updated: 07/04/25 0626    Narrative:      EXAMINATION: US RENAL BILATERAL-  7/4/2025 6:20 AM     REASON FOR EXAM: panda on ckd, r/o obstruction; N17.9-Acute kidney  failure, unspecified     COMPARISON: None       TECHNIQUE: Multiple longitudinal and transverse realtime sonographic  images of the kidneys and urinary bladder are obtained. Gray scale and  color Doppler images were provided. Report and images stored per  institutional and state regulations.     FINDINGS:     RIGHT:  The right kidney measures 12.2 x 4.2 x 5.8 cm. No hydronephrosis. At the  superior pole of the RIGHT kidney there is an anechoic cyst measuring up  to 5.2 x 5.7 x 5.8 cm. No aggressive features. In the interpolar region  of the RIGHT kidney there is a similar 2.9 x 3.1 x 3.9 cm simple cyst.  Questionable calculi also noted in the RIGHT kidney. I suspect mildly  increased renal cortical echogenicity.     LEFT:  The left kidney measures 13 x  4.9 x 4.6 cm. No hydronephrosis. No focal  parenchymal lesion. Slight renal cortical thinning and probable  scattered tiny calculi.     OTHER: IVC and aorta are largely obscured by bowel gas. Partially  decompressed urinary bladder.          Impression:         1.  No hydronephrosis. There are tiny bilateral calcifications  suggestive of renal calculi.     2.  Bilateral renal cysts.     3.  Possible cortical thinning bilaterally and some mild increased renal  parenchymal echogenicity on the RIGHT may suggest chronic renal  parenchymal disease.                 This report was signed and finalized on 7/4/2025 6:23 AM by Dr. Ahsok Odell MD.       XR Chest 1 View [[id number]] Collected: 07/03/25 2137     Updated: 07/03/25 2143    Narrative:      EXAMINATION: XR CHEST 1 VW- 7/3/2025 9:37 PM     HISTORY: cough.     REPORT: Frontal view of the chest was obtained.     COMPARISON: Chest x-ray 3/17/2024.     The lungs are moderately hypoaerated, there is central basilar vascular  congestion and the heart is enlarged. The right hemidiaphragm is  obscured as before, this may be related to atelectasis, though  underlying right basilar pneumonia cannot be excluded with this  appearance. No pneumothorax is identified. There may be a small right  pleural effusion. No acute osseous abnormality.       Impression:      Moderate pulmonary hypoventilation with evidence of mild  CHF. Persistent opacities in the right lung base with obscuration of the  right hemidiaphragm, favored to represent atelectasis rather than  pneumonia but clinical correlation is recommended.     This report was signed and finalized on 7/3/2025 9:40 PM by Dr. Martin Forrester MD.               Culture:  Urine Culture   Date Value Ref Range Status   07/04/2025 >100,000 CFU/mL Escherichia coli (A)  Preliminary         Assessment   1.  Acute kidney injury/improving  2.  Acute tubular necrosis/nonoliguric.  3.  Stage IIIb CKD baseline.  4.  Hypertensive renal  disease.  5.  New onset of gastroenteritis.  6.  Iron deficiency anemia    Plan:  1.  Continue volume resuscitation.  Patient has element of nonoliguric acute tubular necrosis due to persistent volume depletion leading to ATN.  However she had large urine output today which is indicated of early renal recovery.  2.  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  3.  Intravenous ferric gluconate for correction of iron deficiency.  4.  Continue to monitor renal function.      Sergio Weinstein MD  7/5/2025  14:34 CDT

## 2025-07-05 NOTE — PLAN OF CARE
Goal Outcome Evaluation:  Plan of Care Reviewed With: patient        Progress: improving  Outcome Evaluation: Pt up to side of bed for meals today. Daughter at bedside, participates in care. Pt. asked if she can walk to RR alone, explained to patient the need to keep her safe. Pt. agrees to use call light before getting up. C/O headache and nausea today. Fioricet and Zofran given per order. Relief of symptoms. Explained to patient why her meds are on hold, see hospitalist's note. SantinoO. VSS. Safety maintained.

## 2025-07-06 LAB
ALBUMIN SERPL-MCNC: 2.2 G/DL (ref 3.5–5.2)
ALBUMIN/GLOB SERPL: 0.6 G/DL
ALP SERPL-CCNC: 105 U/L (ref 39–117)
ALT SERPL W P-5'-P-CCNC: 18 U/L (ref 1–33)
ANION GAP SERPL CALCULATED.3IONS-SCNC: 12 MMOL/L (ref 5–15)
ANISOCYTOSIS BLD QL: ABNORMAL
AST SERPL-CCNC: 26 U/L (ref 1–32)
BACTERIA SPEC AEROBE CULT: ABNORMAL
BASOPHILS # BLD MANUAL: 0 10*3/MM3 (ref 0–0.2)
BASOPHILS NFR BLD MANUAL: 0 % (ref 0–1.5)
BILIRUB SERPL-MCNC: 0.5 MG/DL (ref 0–1.2)
BUN SERPL-MCNC: 53.7 MG/DL (ref 8–23)
BUN/CREAT SERPL: 20.5 (ref 7–25)
CALCIUM SPEC-SCNC: 7.7 MG/DL (ref 8.6–10.5)
CHLORIDE SERPL-SCNC: 109 MMOL/L (ref 98–107)
CLUMPED PLATELETS: PRESENT
CO2 SERPL-SCNC: 17 MMOL/L (ref 22–29)
CREAT SERPL-MCNC: 2.62 MG/DL (ref 0.57–1)
DEPRECATED RDW RBC AUTO: 46.1 FL (ref 37–54)
EGFRCR SERPLBLD CKD-EPI 2021: 18.6 ML/MIN/1.73
EOSINOPHIL # BLD MANUAL: 0.11 10*3/MM3 (ref 0–0.4)
EOSINOPHIL NFR BLD MANUAL: 1 % (ref 0.3–6.2)
ERYTHROCYTE [DISTWIDTH] IN BLOOD BY AUTOMATED COUNT: 13.2 % (ref 12.3–15.4)
GLOBULIN UR ELPH-MCNC: 3.4 GM/DL
GLUCOSE SERPL-MCNC: 104 MG/DL (ref 65–99)
HCT VFR BLD AUTO: 29.8 % (ref 34–46.6)
HGB BLD-MCNC: 9.5 G/DL (ref 12–15.9)
LYMPHOCYTES # BLD MANUAL: 0.9 10*3/MM3 (ref 0.7–3.1)
LYMPHOCYTES NFR BLD MANUAL: 5.1 % (ref 5–12)
MACROCYTES BLD QL SMEAR: ABNORMAL
MCH RBC QN AUTO: 30.4 PG (ref 26.6–33)
MCHC RBC AUTO-ENTMCNC: 31.9 G/DL (ref 31.5–35.7)
MCV RBC AUTO: 95.2 FL (ref 79–97)
MONOCYTES # BLD: 0.57 10*3/MM3 (ref 0.1–0.9)
NEUTROPHILS # BLD AUTO: 9.45 10*3/MM3 (ref 1.7–7)
NEUTROPHILS NFR BLD MANUAL: 84.8 % (ref 42.7–76)
PLASMA CELL PREC NFR BLD MANUAL: 1 % (ref 0–0)
PLATELET # BLD AUTO: 111 10*3/MM3 (ref 140–450)
PMV BLD AUTO: 12.9 FL (ref 6–12)
POLYCHROMASIA BLD QL SMEAR: ABNORMAL
POTASSIUM SERPL-SCNC: 4 MMOL/L (ref 3.5–5.2)
PROT SERPL-MCNC: 5.6 G/DL (ref 6–8.5)
RBC # BLD AUTO: 3.13 10*6/MM3 (ref 3.77–5.28)
SMALL PLATELETS BLD QL SMEAR: ABNORMAL
SODIUM SERPL-SCNC: 138 MMOL/L (ref 136–145)
VARIANT LYMPHS NFR BLD MANUAL: 8.1 % (ref 19.6–45.3)
WBC NRBC COR # BLD AUTO: 11.14 10*3/MM3 (ref 3.4–10.8)

## 2025-07-06 PROCEDURE — 25010000002 ONDANSETRON PER 1 MG: Performed by: INTERNAL MEDICINE

## 2025-07-06 PROCEDURE — 25010000002 HEPARIN (PORCINE) PER 1000 UNITS: Performed by: INTERNAL MEDICINE

## 2025-07-06 PROCEDURE — 85007 BL SMEAR W/DIFF WBC COUNT: CPT | Performed by: INTERNAL MEDICINE

## 2025-07-06 PROCEDURE — 25810000003 SODIUM CHLORIDE 0.9 % SOLUTION: Performed by: INTERNAL MEDICINE

## 2025-07-06 PROCEDURE — 80053 COMPREHEN METABOLIC PANEL: CPT | Performed by: INTERNAL MEDICINE

## 2025-07-06 PROCEDURE — 85025 COMPLETE CBC W/AUTO DIFF WBC: CPT | Performed by: INTERNAL MEDICINE

## 2025-07-06 PROCEDURE — 25010000002 NA FERRIC GLUC CPLX PER 12.5 MG: Performed by: INTERNAL MEDICINE

## 2025-07-06 RX ORDER — SODIUM BICARBONATE 650 MG/1
650 TABLET ORAL 3 TIMES DAILY
Status: DISCONTINUED | OUTPATIENT
Start: 2025-07-06 | End: 2025-07-08 | Stop reason: HOSPADM

## 2025-07-06 RX ORDER — CHOLECALCIFEROL (VITAMIN D3) 25 MCG
1000 TABLET ORAL DAILY
Status: DISCONTINUED | OUTPATIENT
Start: 2025-07-06 | End: 2025-07-08 | Stop reason: HOSPADM

## 2025-07-06 RX ORDER — CEPHALEXIN 500 MG/1
500 CAPSULE ORAL EVERY 8 HOURS SCHEDULED
Status: DISCONTINUED | OUTPATIENT
Start: 2025-07-06 | End: 2025-07-08 | Stop reason: HOSPADM

## 2025-07-06 RX ADMIN — SODIUM BICARBONATE 650 MG: 650 TABLET ORAL at 15:50

## 2025-07-06 RX ADMIN — CEPHALEXIN 500 MG: 500 CAPSULE ORAL at 13:44

## 2025-07-06 RX ADMIN — SODIUM CHLORIDE 250 MG: 9 INJECTION, SOLUTION INTRAVENOUS at 09:15

## 2025-07-06 RX ADMIN — HEPARIN SODIUM 5000 UNITS: 5000 INJECTION INTRAVENOUS; SUBCUTANEOUS at 20:38

## 2025-07-06 RX ADMIN — Medication 1000 UNITS: at 13:44

## 2025-07-06 RX ADMIN — Medication 10 ML: at 09:15

## 2025-07-06 RX ADMIN — Medication 10 ML: at 20:39

## 2025-07-06 RX ADMIN — CEPHALEXIN 500 MG: 500 CAPSULE ORAL at 20:38

## 2025-07-06 RX ADMIN — HEPARIN SODIUM 5000 UNITS: 5000 INJECTION INTRAVENOUS; SUBCUTANEOUS at 09:15

## 2025-07-06 RX ADMIN — ALLOPURINOL 100 MG: 100 TABLET ORAL at 09:15

## 2025-07-06 RX ADMIN — ONDANSETRON 4 MG: 2 INJECTION INTRAMUSCULAR; INTRAVENOUS at 12:04

## 2025-07-06 RX ADMIN — FLUTICASONE PROPIONATE 2 SPRAY: 50 SPRAY, METERED NASAL at 09:16

## 2025-07-06 RX ADMIN — ESCITALOPRAM 20 MG: 10 TABLET, FILM COATED ORAL at 09:15

## 2025-07-06 RX ADMIN — SODIUM BICARBONATE 650 MG: 650 TABLET ORAL at 20:38

## 2025-07-06 NOTE — PROGRESS NOTES
[facility] Medicine Services  INPATIENT PROGRESS NOTE    Patient Name: Sarah Littlejohn  Date of Admission: 7/3/2025  Today's Date: 07/06/25  Length of Stay: 3  Primary Care Physician: Michael Singleton DO    Subjective   Chief Complaint: Follow-up  HPI   Appreciate input from consultant    Patient now has solid stool.  Renal function improving  Patient states that she has been ambulatory  She is feeling better    Review of Systems   All pertinent negatives and positives are as above. All other systems have been reviewed and are negative unless otherwise stated.     Objective    Temp:  [97.5 °F (36.4 °C)-99 °F (37.2 °C)] 98.2 °F (36.8 °C)  Heart Rate:  [55-63] 58  Resp:  [18-20] 19  BP: (108-143)/(42-58) 142/42  Physical Exam    No distress  GEN: Awake, alert, interactive, in NAD  HEENT: Atraumatic, PERRLA, EOMI, Anicteric, Trachea midline  Lungs: CTAB, no wheezing/rales/rhonchi  Heart: RRR, +S1/s2, no rub  ABD: soft, nt/nd, +BS, no guarding/rebound  Extremities: atraumatic, no cyanosis, no edema  Skin: no rashes or lesions  Neuro: AAOx3, no focal deficits  Psych: normal mood & affect      Results Review:  I have reviewed the labs, radiology results, and diagnostic studies.    Laboratory Data:   Results from last 7 days   Lab Units 07/06/25  0247 07/05/25  0355 07/04/25  1158   WBC 10*3/mm3 11.14* 11.43* 13.63*   HEMOGLOBIN g/dL 9.5* 10.1* 10.1*   HEMATOCRIT % 29.8* 30.9* 29.8*   PLATELETS 10*3/mm3 111* 86* 105*        Results from last 7 days   Lab Units 07/06/25  0247 07/05/25  0355 07/04/25  1648 07/04/25  0431 07/03/25  2155   SODIUM mmol/L 138 138 135*   < > 135*   POTASSIUM mmol/L 4.0 4.0 3.5   < > 3.7   CHLORIDE mmol/L 109* 106 104   < > 101   CO2 mmol/L 17.0* 20.0* 15.0*   < > 17.0*   BUN mg/dL 53.7* 64.4* 67.4*   < > 70.8*   CREATININE mg/dL 2.62* 3.20* 3.46*   < > 3.87*   CALCIUM mg/dL 7.7* 8.0* 8.2*   < > 8.5*   BILIRUBIN mg/dL 0.5 0.6  --   --  0.8   ALK  PHOS U/L 105 98  --   --  99   ALT (SGPT) U/L 18 17  --   --  16   AST (SGOT) U/L 26 27  --   --  30   GLUCOSE mg/dL 104* 84 124*   < > 158*    < > = values in this interval not displayed.       Culture Data:   Urine Culture   Date Value Ref Range Status   07/04/2025 >100,000 CFU/mL Escherichia coli (A)  Preliminary       Radiology Data:   Imaging Results (Last 24 Hours)       ** No results found for the last 24 hours. **            I have reviewed the patient's current medications.     Assessment/Plan   Assessment  Active Hospital Problems    Diagnosis     **Acute renal failure superimposed on stage 3b chronic kidney disease     Cystitis     Diarrhea     Metabolic acidosis     Hypomagnesemia     MGUS (monoclonal gammopathy of unknown significance)     Primary hypertension                  Medical Decision Making  Number and Complexity of problems:     Acute kidney injury superimposed on stage IIIb chronic kidney disease  Metabolic acidosis  Hypomagnesemia corrected  Leukocytosis, thrombocytopenia poole, history of IgM MGUS followed by TABBY Aparicio in outpatient setting  Hypertension   cystitis on antibiotic (pan susceptible E. coli)  Iron deficiency and likely underlying anemia of chronic disease/CKD; ferritin could be acting here as an inflammatory marker  Hypokalemia-resolved  Vitamin D deficiency -vitamin D supplementation    Treatment Plan  Complete course of antibiotic  Continue IV fluid hydrate  Initiated on iron as per nephrology service  normal PTH at 55.2  Patient previously on lisinopril hydrochlorothiazide.  These are on hold.  Monitor blood pressure  Medications reviewed.  This is on hold due to acute kidney injury.  Monitor blood pressure.      Medications reviewed  allopurinol, 100 mg, Oral, Daily  cefTRIAXone, 2,000 mg, Intravenous, Q24H  escitalopram, 20 mg, Oral, Daily  ferric gluconate, 250 mg, Intravenous, Daily  fluticasone, 2 spray, Each Nare, Daily  heparin (porcine), 5,000 Units,  Subcutaneous, Q12H  sodium chloride, 10 mL, Intravenous, Q12H          Conditions and Status  Fair     [facility] Data  External documents reviewed: Reviewed record including home medication  Cardiac tracing (EKG, telemetry) interpretation: -  Radiology interpretation: y  Labs reviewed: y  Any tests that were considered but not ordered: none     Decision rules/scores evaluated (example [id number], Wells, etc): -     Discussed with: Patient and family     Care Planning  Shared decision making: Patient with family and consultant  Code status and discussions:     Disposition  Social Determinants of Health that impact treatment or disposition: None identified at this time  I expect the patient to be discharged to (?)        Electronically signed by Ricardo Buitrago MD, 07/06/25, 12:10 CDT.

## 2025-07-06 NOTE — PROGRESS NOTES
Nephrology ([facility]) Progress Note      Patient:  Sarah Littlejohn  YOB: 1950  Date of Service: 7/6/2025  MRN: [id number]   Acct: [id number]   Primary Care Physician: Michael Singleton DO  Advance Directive:   Code Status and Medical Interventions: No CPR (Do Not Attempt to Resuscitate); Full Support   Ordered at: 07/04/25 0012     Code Status (Patient has no pulse and is not breathing):    No CPR (Do Not Attempt to Resuscitate)     Medical Interventions (Patient has pulse or is breathing):    Full Support     Level Of Support Discussed With:    Patient     Admit Date: 7/3/2025       Hospital Day: 3  Referring Provider: Reid King DO      Patient personally seen and examined.  Complete chart including Consults, Notes, Operative Reports, Labs, Cardiology, and Radiology studies reviewed as able.    Chief complaint: Abnormal labs.    Subjective:  Sarah Littlejohn is a 75 y.o. female  whom we were consulted for acute kidney injury/chronic kidney disease.  Patient has stage IIIb chronic kidney disease, follows Dr. Santiago.  Patient also has history of hypertension, MGUS, anemia and follows hematology.  Presented to the emergency room complaining of nausea vomiting and diarrhea.  She has noticed dizziness.  She was unable to drink or eat anything because of persistent vomiting.  Her urine output has significantly depressed dropped as well.  She decided come to the hospital.  In ER lab data indicated serum creatinine is 3.8 mg estimated GFR of 11 mL.  Her baseline creatinine usually 1.3 mg.  Hospital course markable for fluid resuscitation, has received bolus of IV fluids followed by maintenance IV fluid.  Patient has noticed large urine output.  Hospital course remarkable for volume resuscitation and has some improvement of renal function.    This afternoon patient feels well.  She denies any shortness of breath.  Renal function is slowly  improving.    Allergies:  Wellbutrin [bupropion], Neosporin [neomycin-bacitracin zn-polymyx], and Penicillins    Home Meds:  Medications Prior to Admission   Medication Sig Dispense Refill Last Dose/Taking    allopurinol (ZYLOPRIM) 100 MG tablet Take 1 tablet by mouth Daily. 90 tablet 1 Taking    Ascorbic Acid (VITAMIN C GUMMIE PO) Take 3 tablets by mouth Daily.   Taking    calcium carb-cholecalciferol (Calcium 600+D3) 600-10 MG-MCG tablet per tablet Take 1 tablet by mouth Daily.   Taking    cetirizine (zyrTEC) 10 MG tablet Take 1 tablet by mouth Daily. Obtain OTC   Taking    escitalopram (LEXAPRO) 20 MG tablet Take 1 tablet by mouth Daily. 90 tablet 3 Taking    lisinopril-hydrochlorothiazide (Zestoretic) 20-25 MG per tablet Take 1 tablet by mouth Daily. 90 tablet 1 Taking    Magnesium 250 MG tablet Take 2 tablets by mouth Daily.   Taking    ondansetron (ZOFRAN) 4 MG tablet Take 1 tablet by mouth Every 4 (Four) Hours As Needed for Nausea or Vomiting. 20 tablet 0        Medicines:  Current Facility-Administered Medications   Medication Dose Route Frequency Provider Last Rate Last Admin    acetaminophen (TYLENOL) tablet 650 mg  650 mg Oral Q4H PRN Reid King DO   650 mg at 07/04/25 0718    allopurinol (ZYLOPRIM) tablet 100 mg  100 mg Oral Daily Ricardo Buitrago MD   100 mg at 07/06/25 0915    cephalexin (KEFLEX) capsule 500 mg  500 mg Oral Q8H Ricardo Buitrago MD   500 mg at 07/06/25 1344    cholecalciferol (VITAMIN D3) tablet 1,000 Units  1,000 Units Oral Daily Ricardo Buitrago MD   1,000 Units at 07/06/25 1344    escitalopram (LEXAPRO) tablet 20 mg  20 mg Oral Daily Radha Hussein APRN   20 mg at 07/06/25 0915    ferric gluconate (FERRLECIT) 250 MG in sodium chloride 0.9% 250 mL IVPB  250 mg Intravenous Daily Sergio Weinstein  mL/hr at 07/06/25 0915 250 mg at 07/06/25 0915    fluticasone (FLONASE) 50 MCG/ACT nasal spray 2 spray  2 spray Each Nare Daily Radha APRN    2 spray at 25 0916    heparin (porcine) 5000 UNIT/ML injection 5,000 Units  5,000 Units Subcutaneous Q12H Reid King DO   5,000 Units at 25 0915    labetalol (NORMODYNE,TRANDATE) injection 10 mg  10 mg Intravenous Q6H PRN Reid King DO        ondansetron (ZOFRAN) injection 4 mg  4 mg Intravenous Q6H PRN Reid King,    4 mg at 25 1204    sodium chloride 0.9 % flush 10 mL  10 mL Intravenous PRN Graham Terry Jr., MD        sodium chloride 0.9 % flush 10 mL  10 mL Intravenous PRN Graham Terry Jr., MD        sodium chloride 0.9 % flush 10 mL  10 mL Intravenous Q12H Reid King DO   10 mL at 25 0915    sodium chloride 0.9 % flush 10 mL  10 mL Intravenous PRN Reid King DO        sodium chloride 0.9 % infusion 40 mL  40 mL Intravenous PRN Reid King DO           Past Medical History:  Past Medical History:   Diagnosis Date    Anxiety     CKD (chronic kidney disease) stage 3, GFR 30-59 ml/min     Depression     Hypertension     Seasonal allergies        Past Surgical History:  Past Surgical History:   Procedure Laterality Date    APPENDECTOMY       SECTION      CHOLECYSTECTOMY      KNEE SURGERY         Family History  Family History   Problem Relation Age of Onset    Cancer Mother         non-hodgkins lymphoma    Heart disease Father     Cancer Father         leukemia    No Known Problems Brother     No Known Problems Sister     No Known Problems Daughter     No Known Problems Son     No Known Problems Maternal Grandmother     No Known Problems Paternal Grandmother     No Known Problems Maternal Aunt     No Known Problems Paternal Aunt     BRCA 1/2 Neg Hx     Breast cancer Neg Hx     Colon cancer Neg Hx     Endometrial cancer Neg Hx     Ovarian cancer Neg Hx     Uterine cancer Neg Hx        Social History  Social History     Socioeconomic History    Marital status:    Tobacco Use    Smoking status: Never     Passive exposure: Never     Smokeless tobacco: Never   Vaping Use    Vaping status: Never Used   Substance and Sexual Activity    Alcohol use: No    Drug use: No    Sexual activity: Never       Review of Systems:  History obtained from chart review and the patient  General ROS: No fever or chills  Respiratory ROS: No cough, shortness of breath, wheezing  Cardiovascular ROS: No chest pain or palpitations  Gastrointestinal ROS: No abdominal pain or melena  Genito-Urinary ROS: No dysuria or hematuria  Psych ROS: No anxiety and depression  14 point ROS reviewed with the patient and negative except as noted above and in the HPI unless unable to obtain.    Objective:  Patient Vitals for the past 24 hrs:   BP Temp Temp src Pulse Resp SpO2   07/06/25 1210 129/43 98.6 °F (37 °C) Oral 55 19 95 %   07/06/25 0826 142/42 98.2 °F (36.8 °C) Oral 58 19 98 %   07/06/25 0331 115/58 98.6 °F (37 °C) Oral 57 18 96 %   07/05/25 2337 143/47 99 °F (37.2 °C) Oral 63 20 96 %   07/05/25 2019 136/46 97.5 °F (36.4 °C) Oral 60 20 100 %   07/05/25 1505 108/42 98.5 °F (36.9 °C) Oral 55 19 98 %       Intake/Output Summary (Last 24 hours) at 7/6/2025 1432  Last data filed at 7/6/2025 1340  Gross per 24 hour   Intake 960 ml   Output --   Net 960 ml     General: awake/alert   HEENT: Normocephalic atraumatic head  Neck: Supple no JVD or carotid bruits.  Chest:  clear to auscultation bilaterally without respiratory distress  CVS: regular rate and rhythm  Abdominal: soft, nontender, positive bowel sounds  Extremities: no cyanosis or edema  Skin: warm and dry without rash      Labs:  Results from last 7 days   Lab Units 07/06/25  0247 07/05/25  0355 07/04/25  1158   WBC 10*3/mm3 11.14* 11.43* 13.63*   HEMOGLOBIN g/dL 9.5* 10.1* 10.1*   HEMATOCRIT % 29.8* 30.9* 29.8*   PLATELETS 10*3/mm3 111* 86* 105*         Results from last 7 days   Lab Units 07/06/25  0247 07/05/25  0355 07/04/25  1648 07/04/25  0431 07/03/25  2155   SODIUM mmol/L 138 138 135*   < > 135*   POTASSIUM mmol/L 4.0  4.0 3.5   < > 3.7   CHLORIDE mmol/L 109* 106 104   < > 101   CO2 mmol/L 17.0* 20.0* 15.0*   < > 17.0*   BUN mg/dL 53.7* 64.4* 67.4*   < > 70.8*   CREATININE mg/dL 2.62* 3.20* 3.46*   < > 3.87*   CALCIUM mg/dL 7.7* 8.0* 8.2*   < > 8.5*   EGFR mL/min/1.73 18.6* 14.7* 13.4*   < > 11.7*   BILIRUBIN mg/dL 0.5 0.6  --   --  0.8   ALK PHOS U/L 105 98  --   --  99   ALT (SGPT) U/L 18 17  --   --  16   AST (SGOT) U/L 26 27  --   --  30   GLUCOSE mg/dL 104* 84 124*   < > 158*    < > = values in this interval not displayed.       Radiology:   Imaging Results (Last 72 Hours)       Procedure Component Value Units Date/Time    US Renal Bilateral [[id number]] Collected: 07/04/25 0620     Updated: 07/04/25 0626    Narrative:      EXAMINATION: US RENAL BILATERAL-  7/4/2025 6:20 AM     REASON FOR EXAM: panda on ckd, r/o obstruction; N17.9-Acute kidney  failure, unspecified     COMPARISON: None       TECHNIQUE: Multiple longitudinal and transverse realtime sonographic  images of the kidneys and urinary bladder are obtained. Gray scale and  color Doppler images were provided. Report and images stored per  institutional and state regulations.     FINDINGS:     RIGHT:  The right kidney measures 12.2 x 4.2 x 5.8 cm. No hydronephrosis. At the  superior pole of the RIGHT kidney there is an anechoic cyst measuring up  to 5.2 x 5.7 x 5.8 cm. No aggressive features. In the interpolar region  of the RIGHT kidney there is a similar 2.9 x 3.1 x 3.9 cm simple cyst.  Questionable calculi also noted in the RIGHT kidney. I suspect mildly  increased renal cortical echogenicity.     LEFT:  The left kidney measures 13 x 4.9 x 4.6 cm. No hydronephrosis. No focal  parenchymal lesion. Slight renal cortical thinning and probable  scattered tiny calculi.     OTHER: IVC and aorta are largely obscured by bowel gas. Partially  decompressed urinary bladder.          Impression:         1.  No hydronephrosis. There are tiny bilateral calcifications  suggestive of  renal calculi.     2.  Bilateral renal cysts.     3.  Possible cortical thinning bilaterally and some mild increased renal  parenchymal echogenicity on the RIGHT may suggest chronic renal  parenchymal disease.                 This report was signed and finalized on 7/4/2025 6:23 AM by Dr. Ashok Odell MD.       XR Chest 1 View [[id number]] Collected: 07/03/25 2137     Updated: 07/03/25 2143    Narrative:      EXAMINATION: XR CHEST 1 VW- 7/3/2025 9:37 PM     HISTORY: cough.     REPORT: Frontal view of the chest was obtained.     COMPARISON: Chest x-ray 3/17/2024.     The lungs are moderately hypoaerated, there is central basilar vascular  congestion and the heart is enlarged. The right hemidiaphragm is  obscured as before, this may be related to atelectasis, though  underlying right basilar pneumonia cannot be excluded with this  appearance. No pneumothorax is identified. There may be a small right  pleural effusion. No acute osseous abnormality.       Impression:      Moderate pulmonary hypoventilation with evidence of mild  CHF. Persistent opacities in the right lung base with obscuration of the  right hemidiaphragm, favored to represent atelectasis rather than  pneumonia but clinical correlation is recommended.     This report was signed and finalized on 7/3/2025 9:40 PM by Dr. Martin Forrester MD.               Culture:  Urine Culture   Date Value Ref Range Status   07/04/2025 >100,000 CFU/mL Escherichia coli (A)  Preliminary         Assessment   1.  Acute kidney injury/improving  2.  Acute tubular necrosis/nonoliguric.  3.  Stage IIIb CKD baseline.  4.  Hypertensive renal disease.  5.  New onset of gastroenteritis.  6.  Iron deficiency anemia  7.  Metabolic acidemia    Plan:  1.  I agreed to discontinue IV fluid.  She has nonoliguric acute tubular necrosis with significant improvement of renal function.  2.  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  3.  Continue intravenous ferric gluconate for correction of iron deficiency.  4.  Initiate sodium bicarbonate 650 mg p.o. 3 times daily      Sergio Weinstein MD  7/6/2025  14:32 CDT

## 2025-07-06 NOTE — PLAN OF CARE
Goal Outcome Evaluation:           Progress: improving  Outcome Evaluation: Pt A&O x 4, on RA, pt had low grade 99 fever  at beginnning of shift but VSS since. Pt c/o nausea once during the shift, treated with IV Zofran, no complaints since. No c/o headache during this shift. Daughter at the bedside participating in pt care. Pt up stant  by assist in room, bathroom and walked in the britt.

## 2025-07-06 NOTE — PLAN OF CARE
Goal Outcome Evaluation:   No complaints of pain this shift. Patient received IV iron infusion this shift. Family has been at bedside. New IV started this shift. Medicated once for complaints of nausea this shift. Good relief noted. Patient safety to be maintained this shift, continue to monitor and report abnormal to provider.

## 2025-07-07 LAB
ALBUMIN SERPL-MCNC: 2.4 G/DL (ref 3.5–5.2)
ALBUMIN/GLOB SERPL: 0.8 G/DL
ALP SERPL-CCNC: 110 U/L (ref 39–117)
ALT SERPL W P-5'-P-CCNC: 19 U/L (ref 1–33)
ANION GAP SERPL CALCULATED.3IONS-SCNC: 10 MMOL/L (ref 5–15)
AST SERPL-CCNC: 28 U/L (ref 1–32)
BASOPHILS # BLD MANUAL: 0 10*3/MM3 (ref 0–0.2)
BASOPHILS NFR BLD MANUAL: 0 % (ref 0–1.5)
BILIRUB SERPL-MCNC: 0.4 MG/DL (ref 0–1.2)
BUN SERPL-MCNC: 46.5 MG/DL (ref 8–23)
BUN/CREAT SERPL: 19.2 (ref 7–25)
CALCIUM SPEC-SCNC: 8 MG/DL (ref 8.6–10.5)
CHLORIDE SERPL-SCNC: 110 MMOL/L (ref 98–107)
CO2 SERPL-SCNC: 19 MMOL/L (ref 22–29)
CREAT SERPL-MCNC: 2.42 MG/DL (ref 0.57–1)
DEPRECATED RDW RBC AUTO: 46.3 FL (ref 37–54)
EGFRCR SERPLBLD CKD-EPI 2021: 20.5 ML/MIN/1.73
EOSINOPHIL # BLD MANUAL: 0.22 10*3/MM3 (ref 0–0.4)
EOSINOPHIL NFR BLD MANUAL: 2 % (ref 0.3–6.2)
ERYTHROCYTE [DISTWIDTH] IN BLOOD BY AUTOMATED COUNT: 13.3 % (ref 12.3–15.4)
GLOBULIN UR ELPH-MCNC: 3.2 GM/DL
GLUCOSE SERPL-MCNC: 134 MG/DL (ref 65–99)
HCT VFR BLD AUTO: 29.4 % (ref 34–46.6)
HGB BLD-MCNC: 9.5 G/DL (ref 12–15.9)
LYMPHOCYTES # BLD MANUAL: 1.22 10*3/MM3 (ref 0.7–3.1)
LYMPHOCYTES NFR BLD MANUAL: 5.1 % (ref 5–12)
MCH RBC QN AUTO: 30.9 PG (ref 26.6–33)
MCHC RBC AUTO-ENTMCNC: 32.3 G/DL (ref 31.5–35.7)
MCV RBC AUTO: 95.8 FL (ref 79–97)
METAMYELOCYTES NFR BLD MANUAL: 1 % (ref 0–0)
MONOCYTES # BLD: 0.55 10*3/MM3 (ref 0.1–0.9)
NEUTROPHILS # BLD AUTO: 8.75 10*3/MM3 (ref 1.7–7)
NEUTROPHILS NFR BLD MANUAL: 73.5 % (ref 42.7–76)
NEUTS BAND NFR BLD MANUAL: 7.1 % (ref 0–5)
PLAT MORPH BLD: NORMAL
PLATELET # BLD AUTO: 161 10*3/MM3 (ref 140–450)
PMV BLD AUTO: 11.2 FL (ref 6–12)
POTASSIUM SERPL-SCNC: 4.1 MMOL/L (ref 3.5–5.2)
PROT SERPL-MCNC: 5.6 G/DL (ref 6–8.5)
RBC # BLD AUTO: 3.07 10*6/MM3 (ref 3.77–5.28)
RBC MORPH BLD: NORMAL
SODIUM SERPL-SCNC: 139 MMOL/L (ref 136–145)
VARIANT LYMPHS NFR BLD MANUAL: 1 % (ref 0–5)
VARIANT LYMPHS NFR BLD MANUAL: 10.2 % (ref 19.6–45.3)
WBC MORPH BLD: NORMAL
WBC NRBC COR # BLD AUTO: 10.86 10*3/MM3 (ref 3.4–10.8)

## 2025-07-07 PROCEDURE — 85007 BL SMEAR W/DIFF WBC COUNT: CPT | Performed by: INTERNAL MEDICINE

## 2025-07-07 PROCEDURE — 80053 COMPREHEN METABOLIC PANEL: CPT | Performed by: INTERNAL MEDICINE

## 2025-07-07 PROCEDURE — 25010000002 NA FERRIC GLUC CPLX PER 12.5 MG: Performed by: INTERNAL MEDICINE

## 2025-07-07 PROCEDURE — 85025 COMPLETE CBC W/AUTO DIFF WBC: CPT | Performed by: INTERNAL MEDICINE

## 2025-07-07 PROCEDURE — 25810000003 SODIUM CHLORIDE 0.9 % SOLUTION: Performed by: INTERNAL MEDICINE

## 2025-07-07 PROCEDURE — 25010000002 HEPARIN (PORCINE) PER 1000 UNITS: Performed by: INTERNAL MEDICINE

## 2025-07-07 RX ADMIN — SODIUM BICARBONATE 650 MG: 650 TABLET ORAL at 21:20

## 2025-07-07 RX ADMIN — ESCITALOPRAM 20 MG: 10 TABLET, FILM COATED ORAL at 08:19

## 2025-07-07 RX ADMIN — Medication 1000 UNITS: at 08:19

## 2025-07-07 RX ADMIN — HEPARIN SODIUM 5000 UNITS: 5000 INJECTION INTRAVENOUS; SUBCUTANEOUS at 21:20

## 2025-07-07 RX ADMIN — CEPHALEXIN 500 MG: 500 CAPSULE ORAL at 14:05

## 2025-07-07 RX ADMIN — CEPHALEXIN 500 MG: 500 CAPSULE ORAL at 21:20

## 2025-07-07 RX ADMIN — CEPHALEXIN 500 MG: 500 CAPSULE ORAL at 05:46

## 2025-07-07 RX ADMIN — HEPARIN SODIUM 5000 UNITS: 5000 INJECTION INTRAVENOUS; SUBCUTANEOUS at 08:19

## 2025-07-07 RX ADMIN — Medication 10 ML: at 08:22

## 2025-07-07 RX ADMIN — Medication 10 ML: at 21:22

## 2025-07-07 RX ADMIN — SODIUM BICARBONATE 650 MG: 650 TABLET ORAL at 08:19

## 2025-07-07 RX ADMIN — ALLOPURINOL 100 MG: 100 TABLET ORAL at 08:19

## 2025-07-07 RX ADMIN — SODIUM BICARBONATE 650 MG: 650 TABLET ORAL at 16:18

## 2025-07-07 RX ADMIN — SODIUM CHLORIDE 250 MG: 9 INJECTION, SOLUTION INTRAVENOUS at 08:19

## 2025-07-07 NOTE — PLAN OF CARE
Goal Outcome Evaluation:           Progress: improving  Outcome Evaluation: Pt A&O x 4, on RA, VSS, no c/o nausea during this shift att. Pt has been consuming meals without nausea. She is ambulating to bathroom with stand by assist. She states she feels like she is getting stronger.

## 2025-07-07 NOTE — PROGRESS NOTES
[facility] Medicine Services  INPATIENT PROGRESS NOTE    Patient Name: Sarah Littlejohn  Date of Admission: 7/3/2025  Today's Date: 07/07/25  Length of Stay: 4  Primary Care Physician: Michael Singleton DO    Subjective   Chief Complaint: follow-up acute renal failure  HPI   Patient reports that she is feeling a lot better.  Her symptoms of nausea, vomiting, diarrhea have resolved.  She does report having some generalized weakness.  She reports having better urine output.  She denies any pain symptoms this morning.    Review of Systems   All pertinent negatives and positives are as above. All other systems have been reviewed and are negative unless otherwise stated.     Objective    Temp:  [98.2 °F (36.8 °C)-98.6 °F (37 °C)] 98.5 °F (36.9 °C)  Heart Rate:  [55-58] 57  Resp:  [14-19] 14  BP: (119-150)/(40-57) 119/40  Physical Exam  Vitals reviewed.   Constitutional:       General: She is not in acute distress.     Appearance: She is not toxic-appearing.   HENT:      Head: Normocephalic.      Mouth/Throat:      Mouth: Mucous membranes are moist.   Cardiovascular:      Rate and Rhythm: Normal rate.   Pulmonary:      Effort: Pulmonary effort is normal. No respiratory distress.   Musculoskeletal:         General: No swelling.   Skin:     General: Skin is warm.   Neurological:      Mental Status: She is alert.      Motor: Weakness present.   Psychiatric:         Mood and Affect: Mood normal.         Results Review:  I have reviewed the labs, radiology results, and diagnostic studies.    Laboratory Data:   Results from last 7 days   Lab Units 07/07/25 0416 07/06/25 0247 07/05/25  0355   WBC 10*3/mm3 10.86* 11.14* 11.43*   HEMOGLOBIN g/dL 9.5* 9.5* 10.1*   HEMATOCRIT % 29.4* 29.8* 30.9*   PLATELETS 10*3/mm3 161 111* 86*        Results from last 7 days   Lab Units 07/07/25 0416 07/06/25  0247 07/05/25  0355   SODIUM mmol/L 139 138 138   POTASSIUM mmol/L 4.1 4.0 4.0    CHLORIDE mmol/L 110* 109* 106   CO2 mmol/L 19.0* 17.0* 20.0*   BUN mg/dL 46.5* 53.7* 64.4*   CREATININE mg/dL 2.42* 2.62* 3.20*   CALCIUM mg/dL 8.0* 7.7* 8.0*   BILIRUBIN mg/dL 0.4 0.5 0.6   ALK PHOS U/L 110 105 98   ALT (SGPT) U/L 19 18 17   AST (SGOT) U/L 28 26 27   GLUCOSE mg/dL 134* 104* 84       Culture Data:   Urine Culture   Date Value Ref Range Status   07/04/2025 >100,000 CFU/mL Escherichia coli (A)  Final       Radiology Data:   Imaging Results (Last 24 Hours)       ** No results found for the last 24 hours. **            I have reviewed the patient's current medications.     Assessment/Plan   Assessment  Active Hospital Problems    Diagnosis     **Acute renal failure superimposed on stage 3b chronic kidney disease     Cystitis     Diarrhea     Metabolic acidosis     Hypomagnesemia     MGUS (monoclonal gammopathy of unknown significance)     Primary hypertension        Treatment Plan  IVFs now off  Encourage PO  IV Rocephin transitioned to PO Keflex  PO sodium bicarbonate  Nephrology following and appreciate their assistance  IV ferric gluconate  Increase activity; mobilize  Okay to DC cardiac telemetry  Hold outpatient HCTZ-lisinopril  Dispo: planning for discharge home pending the above.  Patient is followed by Dr. Santiago in the outpatient setting.        Medical Decision Making  Number and Complexity of problems: moderate complexity      Conditions and Status        Condition is improving.     MDM Data  External documents reviewed: none  Cardiac tracing (EKG, telemetry) interpretation: no new EKGs  Radiology interpretation: no new radiology studies  Labs reviewed: as above  Any tests that were considered but not ordered: none     Decision rules/scores evaluated (example [id number], Wells, etc): none     Discussed with: patient     Care Planning  Shared decision making: Discussed with patient with agreement to proceed with treatment plan as outlined  Code status and discussions: DO NOT  RESUSCITATE    Disposition  Social Determinants of Health that impact treatment or disposition: None apparent at this time  I expect the patient to be discharged to home likely tomorrow.        Electronically signed by Perfecto Tenorio MD, 07/07/25, 06:52 CDT.

## 2025-07-07 NOTE — PROGRESS NOTES
Nephrology ([facility]) Progress Note      Patient:  Sarah Littlejohn  YOB: 1950  Date of Service: 7/7/2025  MRN: [id number]   Acct: [id number]   Primary Care Physician: Michael Singleton DO  Advance Directive:   Code Status and Medical Interventions: No CPR (Do Not Attempt to Resuscitate); Full Support   Ordered at: 07/04/25 0012     Code Status (Patient has no pulse and is not breathing):    No CPR (Do Not Attempt to Resuscitate)     Medical Interventions (Patient has pulse or is breathing):    Full Support     Level Of Support Discussed With:    Patient     Admit Date: 7/3/2025       Hospital Day: 4  Referring Provider: Reid King DO      Patient personally seen and examined.  Complete chart including Consults, Notes, Operative Reports, Labs, Cardiology, and Radiology studies reviewed as able.        Subjective:  Sarah Littlejohn is a 75 y.o. female for whom we were consulted for evaluation and treatment of acute kidney injury/chronic kidney disease. Patient has stage IIIb chronic kidney disease and follows with Dr. Santiago. Patient also has a history of hypertension, MGUS, anemia and follows hematology.  She presented to the emergency room complaining of nausea, vomiting and diarrhea. She has noticed dizziness as well. She was unable to drink or eat anything because of persistent vomiting. Her urine output has significantly subjectively decreased as well. She decided to come to the hospital. In ER, her lab data indicated serum creatinine is 3.8 with estimated GFR of 11. Her baseline creatinine was usually 1.3. Hospital course was remarkable for fluid resuscitation.  She has received bolus of IV fluids followed by maintenance IV fluid. Patient has subsequently noticed large urine output. Hospital course remarkable for volume resuscitation and has some improvement of renal function.     Today, no overnight events.  Denied current chest pain,  shortness of air at rest, nausea or vomiting        Allergies:  Wellbutrin [bupropion], Neosporin [neomycin-bacitracin zn-polymyx], and Penicillins    Home Meds:  Medications Prior to Admission   Medication Sig Dispense Refill Last Dose/Taking    allopurinol (ZYLOPRIM) 100 MG tablet Take 1 tablet by mouth Daily. 90 tablet 1 Taking    Ascorbic Acid (VITAMIN C GUMMIE PO) Take 3 tablets by mouth Daily.   Taking    calcium carb-cholecalciferol (Calcium 600+D3) 600-10 MG-MCG tablet per tablet Take 1 tablet by mouth Daily.   Taking    cetirizine (zyrTEC) 10 MG tablet Take 1 tablet by mouth Daily. Obtain OTC   Taking    escitalopram (LEXAPRO) 20 MG tablet Take 1 tablet by mouth Daily. 90 tablet 3 Taking    lisinopril-hydrochlorothiazide (Zestoretic) 20-25 MG per tablet Take 1 tablet by mouth Daily. 90 tablet 1 Taking    Magnesium 250 MG tablet Take 2 tablets by mouth Daily.   Taking    ondansetron (ZOFRAN) 4 MG tablet Take 1 tablet by mouth Every 4 (Four) Hours As Needed for Nausea or Vomiting. 20 tablet 0        Medicines:  Current Facility-Administered Medications   Medication Dose Route Frequency Provider Last Rate Last Admin    acetaminophen (TYLENOL) tablet 650 mg  650 mg Oral Q4H PRN Reid King DO   650 mg at 07/04/25 0718    allopurinol (ZYLOPRIM) tablet 100 mg  100 mg Oral Daily Ricardo Buitrago MD   100 mg at 07/07/25 0819    cephalexin (KEFLEX) capsule 500 mg  500 mg Oral Q8H Perfecto Tenorio MD   500 mg at 07/07/25 1405    cholecalciferol (VITAMIN D3) tablet 1,000 Units  1,000 Units Oral Daily Ricardo Buitrago MD   1,000 Units at 07/07/25 0819    escitalopram (LEXAPRO) tablet 20 mg  20 mg Oral Daily Radha Hussein APRN   20 mg at 07/07/25 0819    fluticasone (FLONASE) 50 MCG/ACT nasal spray 2 spray  2 spray Each Nare Daily Radha Hussein APRN   2 spray at 07/06/25 0916    heparin (porcine) 5000 UNIT/ML injection 5,000 Units  5,000 Units Subcutaneous Q12H Reid King, DO    5,000 Units at 25 0819    labetalol (NORMODYNE,TRANDATE) injection 10 mg  10 mg Intravenous Q6H PRN Reid King DO        ondansetron (ZOFRAN) injection 4 mg  4 mg Intravenous Q6H PRN Reid King DO   4 mg at 25 1204    sodium bicarbonate tablet 650 mg  650 mg Oral TID Sergio Weinstein MD   650 mg at 25 1618    sodium chloride 0.9 % flush 10 mL  10 mL Intravenous PRN Graham Terry Jr., MD        sodium chloride 0.9 % flush 10 mL  10 mL Intravenous PRN Graham Terry Jr., MD        sodium chloride 0.9 % flush 10 mL  10 mL Intravenous Q12H Reid King DO   10 mL at 25 0822    sodium chloride 0.9 % flush 10 mL  10 mL Intravenous PRN Reid King DO        sodium chloride 0.9 % infusion 40 mL  40 mL Intravenous PRN Reid King DO           Past Medical History:  Past Medical History:   Diagnosis Date    Anxiety     CKD (chronic kidney disease) stage 3, GFR 30-59 ml/min     Depression     Hypertension     Seasonal allergies        Past Surgical History:  Past Surgical History:   Procedure Laterality Date    APPENDECTOMY       SECTION      CHOLECYSTECTOMY      KNEE SURGERY         Family History  Family History   Problem Relation Age of Onset    Cancer Mother         non-hodgkins lymphoma    Heart disease Father     Cancer Father         leukemia    No Known Problems Brother     No Known Problems Sister     No Known Problems Daughter     No Known Problems Son     No Known Problems Maternal Grandmother     No Known Problems Paternal Grandmother     No Known Problems Maternal Aunt     No Known Problems Paternal Aunt     BRCA 1/2 Neg Hx     Breast cancer Neg Hx     Colon cancer Neg Hx     Endometrial cancer Neg Hx     Ovarian cancer Neg Hx     Uterine cancer Neg Hx        Social History  Social History     Socioeconomic History    Marital status:    Tobacco Use    Smoking status: Never     Passive exposure: Never    Smokeless tobacco: Never   Vaping Use     Vaping status: Never Used   Substance and Sexual Activity    Alcohol use: No    Drug use: No    Sexual activity: Never       Review of Systems:  History obtained from chart review and the patient  General ROS: No fever or chills  Respiratory ROS: No cough, shortness of breath, wheezing  Cardiovascular ROS: No chest pain or palpitations  Gastrointestinal ROS: No abdominal pain or melena  Genito-Urinary ROS: No dysuria or hematuria  Psych ROS: No anxiety and depression  14 point ROS reviewed with the patient and negative except as noted above and in the HPI unless unable to obtain.    Objective:  Patient Vitals for the past 24 hrs:   BP Temp Temp src Pulse Resp SpO2   07/07/25 1500 142/43 97.9 °F (36.6 °C) Oral 56 14 98 %   07/07/25 1100 (!) 143/39 98.3 °F (36.8 °C) Oral 52 14 96 %   07/07/25 0700 139/41 97.6 °F (36.4 °C) Oral 66 14 95 %   07/07/25 0423 119/40 98.5 °F (36.9 °C) Oral 57 14 96 %   07/06/25 1931 120/57 98.4 °F (36.9 °C) Oral 55 16 97 %     No intake or output data in the 24 hours ending 07/07/25 1808  General: awake/alert   Chest:  clear to auscultation bilaterally without respiratory distress  CVS: regular rate and rhythm  Abdominal: soft, nontender, positive bowel sounds  Extremities: no cyanosis or edema  Skin: warm and dry without rash      Labs:  Results from last 7 days   Lab Units 07/07/25 0416 07/06/25 0247 07/05/25  0355   WBC 10*3/mm3 10.86* 11.14* 11.43*   HEMOGLOBIN g/dL 9.5* 9.5* 10.1*   HEMATOCRIT % 29.4* 29.8* 30.9*   PLATELETS 10*3/mm3 161 111* 86*         Results from last 7 days   Lab Units 07/07/25 0416 07/06/25  0247 07/05/25  0355   SODIUM mmol/L 139 138 138   POTASSIUM mmol/L 4.1 4.0 4.0   CHLORIDE mmol/L 110* 109* 106   CO2 mmol/L 19.0* 17.0* 20.0*   BUN mg/dL 46.5* 53.7* 64.4*   CREATININE mg/dL 2.42* 2.62* 3.20*   CALCIUM mg/dL 8.0* 7.7* 8.0*   EGFR mL/min/1.73 20.5* 18.6* 14.7*   BILIRUBIN mg/dL 0.4 0.5 0.6   ALK PHOS U/L 110 105 98   ALT (SGPT) U/L 19 18 17   AST (SGOT)  U/L 28 26 27   GLUCOSE mg/dL 134* 104* 84       Radiology:   Imaging Results (Last 72 Hours)       ** No results found for the last 72 hours. **            Culture:  Urine Culture   Date Value Ref Range Status   07/04/2025 >100,000 CFU/mL Escherichia coli (A)  Final         Assessment   Acute kidney injury/ATN  Chronic kidney disease stage IIIb  Hypertension with hypertensive nephrosclerosis  Gastroenteritis  Anemia with iron deficiency  Metabolic acidosis    Plan:  Discussed with patient, nursing  Workup reviewed today  Monitor labs done ultrasound without obstruction  Oral bicarbonate  Follow-up in the office 1 to 2 weeks at discharge with ANY Adam or Dr. Santiago, if not restarted prior to discharge may be able to restart RAAS blockade at that time      Wilmer Garcia MD  7/7/2025  18:08 CDT

## 2025-07-08 ENCOUNTER — READMISSION MANAGEMENT (OUTPATIENT)
Dept: CALL CENTER | Facility: HOSPITAL | Age: 75
End: 2025-07-08
Payer: MEDICARE

## 2025-07-08 VITALS
RESPIRATION RATE: 16 BRPM | HEIGHT: 63 IN | DIASTOLIC BLOOD PRESSURE: 80 MMHG | SYSTOLIC BLOOD PRESSURE: 144 MMHG | BODY MASS INDEX: 45.71 KG/M2 | TEMPERATURE: 97.4 F | HEART RATE: 87 BPM | OXYGEN SATURATION: 97 % | WEIGHT: 258 LBS

## 2025-07-08 LAB
ANION GAP SERPL CALCULATED.3IONS-SCNC: 12 MMOL/L (ref 5–15)
BUN SERPL-MCNC: 39.9 MG/DL (ref 8–23)
BUN/CREAT SERPL: 18.4 (ref 7–25)
CALCIUM SPEC-SCNC: 8 MG/DL (ref 8.6–10.5)
CHLORIDE SERPL-SCNC: 110 MMOL/L (ref 98–107)
CO2 SERPL-SCNC: 19 MMOL/L (ref 22–29)
CREAT SERPL-MCNC: 2.17 MG/DL (ref 0.57–1)
EGFRCR SERPLBLD CKD-EPI 2021: 23.4 ML/MIN/1.73
GLUCOSE SERPL-MCNC: 134 MG/DL (ref 65–99)
POTASSIUM SERPL-SCNC: 3.9 MMOL/L (ref 3.5–5.2)
SODIUM SERPL-SCNC: 141 MMOL/L (ref 136–145)

## 2025-07-08 PROCEDURE — 80048 BASIC METABOLIC PNL TOTAL CA: CPT | Performed by: INTERNAL MEDICINE

## 2025-07-08 RX ORDER — CEPHALEXIN 500 MG/1
500 CAPSULE ORAL EVERY 8 HOURS SCHEDULED
Qty: 2 CAPSULE | Refills: 0 | Status: SHIPPED | OUTPATIENT
Start: 2025-07-08 | End: 2025-07-09

## 2025-07-08 RX ORDER — SODIUM BICARBONATE 650 MG/1
650 TABLET ORAL 3 TIMES DAILY
Qty: 90 TABLET | Refills: 1 | Status: SHIPPED | OUTPATIENT
Start: 2025-07-08

## 2025-07-08 RX ORDER — CHOLECALCIFEROL (VITAMIN D3) 25 MCG
1000 TABLET ORAL DAILY
Qty: 30 TABLET | Refills: 2 | Status: SHIPPED | OUTPATIENT
Start: 2025-07-08

## 2025-07-08 RX ADMIN — ALLOPURINOL 100 MG: 100 TABLET ORAL at 08:37

## 2025-07-08 RX ADMIN — Medication 10 ML: at 08:37

## 2025-07-08 RX ADMIN — SODIUM BICARBONATE 650 MG: 650 TABLET ORAL at 08:37

## 2025-07-08 RX ADMIN — ESCITALOPRAM 20 MG: 10 TABLET, FILM COATED ORAL at 08:37

## 2025-07-08 RX ADMIN — CEPHALEXIN 500 MG: 500 CAPSULE ORAL at 05:35

## 2025-07-08 RX ADMIN — Medication 1000 UNITS: at 08:37

## 2025-07-08 NOTE — PLAN OF CARE
Problem: Adult Inpatient Plan of Care  Goal: Plan of Care Review  Outcome: Adequate for Care Transition  Goal: Patient-Specific Goal (Individualized)  Outcome: Adequate for Care Transition  Goal: Absence of Hospital-Acquired Illness or Injury  Outcome: Adequate for Care Transition  Goal: Optimal Comfort and Wellbeing  Outcome: Adequate for Care Transition  Goal: Readiness for Transition of Care  Outcome: Adequate for Care Transition     Problem: Electrolyte Imbalance  Goal: Electrolyte Balance  Outcome: Adequate for Care Transition     Problem: Infection  Goal: Absence of Infection Signs and Symptoms  Outcome: Adequate for Care Transition     Problem: Acute Kidney Injury/Impairment  Goal: Fluid and Electrolyte Balance  Outcome: Adequate for Care Transition  Goal: Improved Oral Intake  Outcome: Adequate for Care Transition  Goal: Effective Renal Function  Outcome: Adequate for Care Transition     Problem: Infection  Goal: Absence of Infection Signs and Symptoms  Outcome: Adequate for Care Transition     Problem: Electrolyte Imbalance  Goal: Electrolyte Balance  Outcome: Adequate for Care Transition     Problem: Fall Injury Risk  Goal: Absence of Fall and Fall-Related Injury  Outcome: Adequate for Care Transition     Problem: Skin Injury Risk Increased  Goal: Skin Health and Integrity  Outcome: Adequate for Care Transition   Goal Outcome Evaluation:

## 2025-07-08 NOTE — PLAN OF CARE
Goal Outcome Evaluation:  Plan of Care Reviewed With: patient        Progress: improving  Outcome Evaluation: Pt A&O x 4, she is on RA, no c/o pain or nausea during this shift. Pt is walking to BR with stand by assist when needed. She is stronger and her balance is improving. VSS telemetry SR. Pt states she looking forward to returning home as soon as MD says she is stable enough. Pt to follow up with nephrology outpatient after DC.

## 2025-07-08 NOTE — DISCHARGE SUMMARY
[facility] Medicine Services  DISCHARGE SUMMARY       Date of Admission: 7/3/2025  Date of Discharge:  7/8/2025  Primary Care Physician: Michael Singleton DO    Presenting Problem/History of Present Illness:  Weakness; Diarrhea    Final Discharge Diagnoses:  Active Hospital Problems    Diagnosis     **Acute renal failure superimposed on stage 3b chronic kidney disease     Cystitis     Diarrhea     Metabolic acidosis     Hypomagnesemia     MGUS (monoclonal gammopathy of unknown significance)     Primary hypertension        Consults: Nephrology      Pertinent Test Results:   Results for orders placed during the hospital encounter of 12/13/21    Adult Transthoracic Echo Complete W/ Cont if Necessary Per Protocol    Interpretation Summary  · Left ventricular diastolic function is consistent with age.  · Left ventricular ejection fraction appears to be 61 - 65%. Left ventricular systolic function is normal.  · Normal right ventricular cavity size and systolic function noted.  · There were no significant (greater than mild) valvular dysfunction.      Imaging Results (All)       Procedure Component Value Units Date/Time    US Renal Bilateral [[id number]] Collected: 07/04/25 0620     Updated: 07/04/25 0626    Narrative:      EXAMINATION: US RENAL BILATERAL-  7/4/2025 6:20 AM     REASON FOR EXAM: panda on ckd, r/o obstruction; N17.9-Acute kidney  failure, unspecified     COMPARISON: None       TECHNIQUE: Multiple longitudinal and transverse realtime sonographic  images of the kidneys and urinary bladder are obtained. Gray scale and  color Doppler images were provided. Report and images stored per  institutional and state regulations.     FINDINGS:     RIGHT:  The right kidney measures 12.2 x 4.2 x 5.8 cm. No hydronephrosis. At the  superior pole of the RIGHT kidney there is an anechoic cyst measuring up  to 5.2 x 5.7 x 5.8 cm. No aggressive features. In the interpolar region  of the  RIGHT kidney there is a similar 2.9 x 3.1 x 3.9 cm simple cyst.  Questionable calculi also noted in the RIGHT kidney. I suspect mildly  increased renal cortical echogenicity.     LEFT:  The left kidney measures 13 x 4.9 x 4.6 cm. No hydronephrosis. No focal  parenchymal lesion. Slight renal cortical thinning and probable  scattered tiny calculi.     OTHER: IVC and aorta are largely obscured by bowel gas. Partially  decompressed urinary bladder.          Impression:         1.  No hydronephrosis. There are tiny bilateral calcifications  suggestive of renal calculi.     2.  Bilateral renal cysts.     3.  Possible cortical thinning bilaterally and some mild increased renal  parenchymal echogenicity on the RIGHT may suggest chronic renal  parenchymal disease.                 This report was signed and finalized on 7/4/2025 6:23 AM by Dr. Ashok Odell MD.       XR Chest 1 View [[id number]] Collected: 07/03/25 2137     Updated: 07/03/25 2143    Narrative:      EXAMINATION: XR CHEST 1 VW- 7/3/2025 9:37 PM     HISTORY: cough.     REPORT: Frontal view of the chest was obtained.     COMPARISON: Chest x-ray 3/17/2024.     The lungs are moderately hypoaerated, there is central basilar vascular  congestion and the heart is enlarged. The right hemidiaphragm is  obscured as before, this may be related to atelectasis, though  underlying right basilar pneumonia cannot be excluded with this  appearance. No pneumothorax is identified. There may be a small right  pleural effusion. No acute osseous abnormality.       Impression:      Moderate pulmonary hypoventilation with evidence of mild  CHF. Persistent opacities in the right lung base with obscuration of the  right hemidiaphragm, favored to represent atelectasis rather than  pneumonia but clinical correlation is recommended.     This report was signed and finalized on 7/3/2025 9:40 PM by Dr. Martin Forrester MD.             LAB RESULTS:      Lab 07/07/25  0416 07/06/25  0248  07/05/25  0355 07/04/25  1158 07/03/25  2155   WBC 10.86* 11.14* 11.43* 13.63* 13.12*   HEMOGLOBIN 9.5* 9.5* 10.1* 10.1* 11.4*   HEMATOCRIT 29.4* 29.8* 30.9* 29.8* 33.3*   PLATELETS 161 111* 86* 105* 98*   NEUTROS ABS 8.75* 9.45* 8.68* 12.54* 11.95*   IMMATURE GRANS (ABS)  --   --  0.15*  --   --    LYMPHS ABS  --   --  1.07  --   --    MONOS ABS  --   --  1.43*  --   --    EOS ABS 0.22 0.11 0.07 0.00 0.00   MCV 95.8 95.2 94.2 92.3 92.2         Lab 07/08/25  0144 07/07/25  0416 07/06/25  0247 07/05/25  0355 07/04/25  1648 07/04/25  0920 07/04/25  0431 07/03/25  2155   SODIUM 141 139 138 138 135*   < > 135* 135*   POTASSIUM 3.9 4.1 4.0 4.0 3.5   < > 3.4* 3.7   CHLORIDE 110* 110* 109* 106 104   < > 104 101   CO2 19.0* 19.0* 17.0* 20.0* 15.0*   < > 18.0* 17.0*   ANION GAP 12.0 10.0 12.0 12.0 16.0*   < > 13.0 17.0*   BUN 39.9* 46.5* 53.7* 64.4* 67.4*   < > 71.4* 70.8*   CREATININE 2.17* 2.42* 2.62* 3.20* 3.46*   < > 3.75* 3.87*   EGFR 23.4* 20.5* 18.6* 14.7* 13.4*   < > 12.1* 11.7*   GLUCOSE 134* 134* 104* 84 124*   < > 152* 158*   CALCIUM 8.0* 8.0* 7.7* 8.0* 8.2*   < > 8.1* 8.5*   MAGNESIUM  --   --   --   --   --   --  2.0 1.2*   PHOSPHORUS  --   --   --   --   --   --  4.3  --     < > = values in this interval not displayed.         Lab 07/07/25  0416 07/06/25  0247 07/05/25  0355 07/03/25  2155   TOTAL PROTEIN 5.6* 5.6* 6.1 6.7   ALBUMIN 2.4* 2.2* 2.5* 2.8*   GLOBULIN 3.2 3.4 3.6 3.9   ALT (SGPT) 19 18 17 16   AST (SGOT) 28 26 27 30   BILIRUBIN 0.4 0.5 0.6 0.8   ALK PHOS 110 105 98 99                 Lab 07/04/25  0920   IRON 15*   IRON SATURATION (TSAT) 10*   TIBC 156*   TRANSFERRIN 105*   FERRITIN 719.60*         Brief Urine Lab Results  (Last result in the past 365 days)        Color   Clarity   Blood   Leuk Est   Nitrite   Protein   CREAT   Urine HCG        07/05/25 0516             46.4         07/05/25 0516             62.1               Microbiology Results (last 10 days)       Procedure Component Value -  Date/Time    Eosinophil Smear - Urine, Urine, Clean Catch [[id number]]  (Normal) Collected: 07/04/25 0822    Lab Status: Final result Specimen: Urine, Clean Catch Updated: 07/05/25 1240     Eosinophil Smear 0 % EOS/100 Cells     Urine Culture - Urine, Urine, Clean Catch [[id number]]  (Abnormal)  (Susceptibility) Collected: 07/04/25 0822    Lab Status: Final result Specimen: Urine, Clean Catch Updated: 07/06/25 0959     Urine Culture >100,000 CFU/mL Escherichia coli    Narrative:      Colonization of the urinary tract without infection is common. Treatment is discouraged unless the patient is symptomatic, pregnant, or undergoing an invasive urologic procedure.    Susceptibility        Escherichia coli      KAJAL      Amoxicillin + Clavulanate Susceptible      Ampicillin Susceptible      Ampicillin + Sulbactam Susceptible      Cefazolin (Urine) Susceptible      Cefepime Susceptible      Ceftazidime Susceptible      Ceftriaxone Susceptible      Cefuroxime axetil Susceptible      Gentamicin Susceptible      Levofloxacin Susceptible      Nitrofurantoin Susceptible      Piperacillin + Tazobactam Susceptible      Trimethoprim + Sulfamethoxazole Susceptible                           COVID PRE-OP / PRE-PROCEDURE SCREENING ORDER (NO ISOLATION) - Swab, Nasopharynx [[id number]]  (Normal) Collected: 07/03/25 2130    Lab Status: Final result Specimen: Swab from Nasopharynx Updated: 07/03/25 2220    Narrative:      The following orders were created for panel order COVID PRE-OP / PRE-PROCEDURE SCREENING ORDER (NO ISOLATION) - Swab, Nasopharynx.  Procedure                               Abnormality         Status                     ---------                               -----------         ------                     COVID-19 + RSV PCR - Swa...[[id number]]  Normal              Final result                 Please view results for these tests on the individual orders.    COVID-19 + RSV PCR - Swab, Nasopharynx [[id number]]  (Normal)  Collected: 07/03/25 2130    Lab Status: Final result Specimen: Swab from Nasopharynx Updated: 07/03/25 2220     COVID19 Not Detected     RSV, PCR Not Detected            Hospital Course:   Patient is a very pleasant 75-year-old  female with past medical history significant for stage III chronic kidney disease followed by Dr. Santiago of nephrology in the outpatient setting, hypertension, in addition to MGUS, that presented to our facility on 7/3/2025 after experiencing ongoing diarrhea with occasional symptoms of nausea and vomiting.  Patient indicated that she was having the symptoms for 4-5 days leading up to this hospitalization.  She felt like she was becoming dehydrated, and was becoming much more weak which was generalized and nonfocal in nature.  Her p.o. intake had dramatically decreased given her ongoing symptoms which included nausea with vomiting.  She also noted a decline in her urine output.  On arrival to our facility routine laboratory studies were performed revealing a creatinine of 3.87 with a GFR of approximately 11.  Patient was admitted to the hospitalist service for further workup and management and nephrology was also consulted and followed along with us during this hospitalization.    Patient was started on intravenous fluids, and initially given her concurrent acidosis she was on intravenous fluids containing sodium bicarbonate.  She also had hypomagnesemia which was replaced.  Her outpatient antihypertensive medications including lisinopril and HCTZ were placed on hold in the setting of JAYDA.  A renal ultrasound was obtained revealing possible cortical thinning bilaterally and some mild increased renal parenchymal echogenicity on the RIGHT may suggest chronic renal parenchymal disease; renal cysts were noted; there was no evidence of hydronephrosis.    She was also found to have evidence of urinary tract infection.  Urine culture was positive for E. coli.  Patient initially was placed  "on IV Rocephin which was transitioned over to p.o. Keflex, and she will complete a short course of antibiotics for management of this UTI.    She has had steady improvement in her creatinine and GFR on a daily basis.  She has been started on oral bicarbonate which we will continue at discharge.  She has also been started on a vitamin D supplement.  Her creatinine is trended from 3.8 down to 2.17.  Her GFR has improved up to 23.4.  She has had daily improvement in these numbers, and plans are in place for her to be discharged home with outpatient follow-up in 1 week in the nephrology clinic with plans for repeat basic metabolic panel.    Her symptoms of nausea, vomiting, and diarrhea have resolved.  She is tolerating her diet without problem.  We discussed the importance of good hydration.  For the time being we will continue to hold her lisinopril and HCTZ, and this can be reassessed during her follow-up in 1 week in the nephrology clinic.  All in all, patient feels much better and plans are in place for discharge home with close outpatient follow-up as described.      Physical Exam on Discharge:  /52 (BP Location: Left arm, Patient Position: Lying)   Pulse 64   Temp 98.6 °F (37 °C) (Oral)   Resp 18   Ht 160 cm (62.99\")   Wt 117 kg (258 lb)   SpO2 94%   BMI 45.71 kg/m²   Physical Exam  Vitals reviewed.   Constitutional:       General: She is not in acute distress.     Appearance: She is not toxic-appearing.   HENT:      Head: Normocephalic.      Mouth/Throat:      Mouth: Mucous membranes are moist.   Pulmonary:      Effort: Pulmonary effort is normal. No respiratory distress.   Skin:     General: Skin is warm.   Neurological:      Mental Status: She is alert. Mental status is at baseline.   Psychiatric:         Mood and Affect: Mood normal.         Condition on Discharge: medically stable    Discharge Disposition:  Home or Self Care    Discharge Medications:     Discharge Medications        PAUSE taking " these medications        Instructions Start Date   lisinopril-hydrochlorothiazide 20-25 MG per tablet  Wait to take this until your doctor or other care provider tells you to start again.  Commonly known as: Zestoretic   1 tablet, Oral, Daily             New Medications        Instructions Start Date   cephalexin 500 MG capsule  Commonly known as: KEFLEX   500 mg, Oral, Every 8 Hours Scheduled      cholecalciferol 25 MCG (1000 UT) tablet  Commonly known as: VITAMIN D3   1,000 Units, Oral, Daily      sodium bicarbonate 650 MG tablet   650 mg, Oral, 3 Times Daily             Continue These Medications        Instructions Start Date   allopurinol 100 MG tablet  Commonly known as: ZYLOPRIM   100 mg, Oral, Daily      Calcium 600+D3 600-10 MG-MCG tablet per tablet  Generic drug: calcium carb-cholecalciferol   1 tablet, Oral, Daily      cetirizine 10 MG tablet  Commonly known as: zyrTEC   10 mg, Oral, Daily, Obtain OTC      escitalopram 20 MG tablet  Commonly known as: LEXAPRO   20 mg, Oral, Daily      Magnesium 250 MG tablet   2 each, Daily      ondansetron 4 MG tablet  Commonly known as: ZOFRAN   4 mg, Oral, Every 4 Hours PRN      VITAMIN C GUMMIE PO   3 tablets, Oral, Daily               Discharge Diet: heart healthy diet    Activity at Discharge: as tolerated    Follow-up Appointments:   Future Appointments   Date Time Provider Department Center   8/1/2025  9:15 AM Michael Singleton DO [facility]   1 week follow-up with Marcus Tanner or Dr. Santiago in the nephrology clinic with a repeat basic metabolic panel please    Test Results Pending at Discharge: none    Electronically signed by Perfecto Tenorio MD, 07/08/25, 06:47 CDT.    Time: 35 minutes.

## 2025-07-08 NOTE — OUTREACH NOTE
Prep Survey      Flowsheet Row Responses   [facility] patient discharged from? [place]   Is LACE score < 7 ? No   Eligibility [facility]   Date of Admission 07/03/25   Date of Discharge 07/08/25   Discharge Disposition Home or Self Care   Discharge diagnosis Acute renal failure superimposed on stage 3b chronic kidney disease   Does the patient have one of the following disease processes/diagnoses(primary or secondary)? Other   Does the patient have Home health ordered? No   Is there a DME ordered? No   Prep survey completed? Yes            Gabi ROBLES - Registered Nurse

## 2025-07-09 ENCOUNTER — TRANSITIONAL CARE MANAGEMENT TELEPHONE ENCOUNTER (OUTPATIENT)
Dept: CALL CENTER | Facility: HOSPITAL | Age: 75
End: 2025-07-09
Payer: MEDICARE

## 2025-07-09 NOTE — OUTREACH NOTE
Call Center TCM Note      Flowsheet Row Responses   [facility] patient discharged from? [place]   Does the patient have one of the following disease processes/diagnoses(primary or secondary)? Other   TCM attempt successful? No   Unsuccessful attempts Attempt 1  [no verbal release on file for PCP.]            Trinh Pfeiffer Registered Nurse    7/9/2025, 14:48 CDT

## 2025-07-09 NOTE — OUTREACH NOTE
Call Center TCM Note      Flowsheet Row Responses   [facility] patient discharged from? [place]   Does the patient have one of the following disease processes/diagnoses(primary or secondary)? Other   TCM attempt successful? Yes   Call start time [id number]   Call end time 1535   Discharge diagnosis Acute renal failure superimposed on stage 3b chronic kidney disease   Person spoke with today (if not patient) and relationship Patient   Medication alerts for this patient Keflex, Vitamin D3, Sodium Bicarbonate   Meds reviewed with patient/caregiver? Yes   Is the patient having any side effects they believe may be caused by any medication additions or changes? No   Does the patient have all medications ordered at discharge? Yes   Prescription comments No questions or concerns with medications.   Is the patient taking all medications as directed (includes completed medication regime)? Yes   Comments PCP HOSPITAL f/u appt on 7/15/25 at 10:00 AM with ANY Baxter.   Does the patient have an appointment with their PCP within 7-14 days of discharge? Yes   Has home health visited the patient within 72 hours of discharge? N/A   Psychosocial issues? No   Comments Patient reports she is doing better. No further nausea or vomiting. Patient reports she is still experiencing diarrhea. Encouraged to stay hydrated.   Did the patient receive a copy of their discharge instructions? Yes   Nursing interventions Reviewed instructions with patient   What is the patient's perception of their health status since discharge? Improving   Is the patient/caregiver able to teach back signs and symptoms related to disease process for when to call PCP? Yes   Is the patient/caregiver able to teach back signs and symptoms related to disease process for when to call 911? Yes   Is the patient/caregiver able to teach back the hierarchy of who to call/visit for symptoms/problems? PCP, Specialist, Home health nurse, Urgent Care, ED, 911 Yes   If the  patient is a current smoker, are they able to teach back resources for cessation? Not a smoker   TCM call completed? Yes   Wrap up additional comments PCP HOSPITAL f/u appt on 7/15/25 at 10:00 AM with ANY Baxter.   Call end time [id number]   Would this patient benefit from a Referral to [facility] Social Work? No   Is the patient interested in additional calls from an ambulatory ? No            Trinh DOMÍNGUEZ - Registered Nurse    7/9/2025, 15:36 CDT

## 2025-07-15 ENCOUNTER — OFFICE VISIT (OUTPATIENT)
Dept: INTERNAL MEDICINE | Facility: CLINIC | Age: 75
End: 2025-07-15
Payer: MEDICARE

## 2025-07-15 ENCOUNTER — LAB (OUTPATIENT)
Dept: LAB | Facility: HOSPITAL | Age: 75
End: 2025-07-15
Payer: MEDICARE

## 2025-07-15 VITALS
DIASTOLIC BLOOD PRESSURE: 64 MMHG | SYSTOLIC BLOOD PRESSURE: 132 MMHG | OXYGEN SATURATION: 99 % | BODY MASS INDEX: 47.19 KG/M2 | HEIGHT: 62 IN | HEART RATE: 62 BPM

## 2025-07-15 DIAGNOSIS — N39.0 URINARY TRACT INFECTION WITHOUT HEMATURIA, SITE UNSPECIFIED: ICD-10-CM

## 2025-07-15 DIAGNOSIS — N17.9 ACUTE RENAL FAILURE, UNSPECIFIED ACUTE RENAL FAILURE TYPE: ICD-10-CM

## 2025-07-15 DIAGNOSIS — R19.7 DIARRHEA, UNSPECIFIED TYPE: Primary | ICD-10-CM

## 2025-07-15 DIAGNOSIS — N18.32 STAGE 3B CHRONIC KIDNEY DISEASE: ICD-10-CM

## 2025-07-15 DIAGNOSIS — D64.89 ANEMIA DUE TO OTHER CAUSE, NOT CLASSIFIED: ICD-10-CM

## 2025-07-15 DIAGNOSIS — R11.2 NAUSEA AND VOMITING, UNSPECIFIED VOMITING TYPE: ICD-10-CM

## 2025-07-15 LAB
ALBUMIN SERPL-MCNC: 3.3 G/DL (ref 3.5–5.2)
ALBUMIN/GLOB SERPL: 0.8 G/DL
ALP SERPL-CCNC: 81 U/L (ref 39–117)
ALT SERPL W P-5'-P-CCNC: 10 U/L (ref 1–33)
ANION GAP SERPL CALCULATED.3IONS-SCNC: 14 MMOL/L (ref 5–15)
AST SERPL-CCNC: 17 U/L (ref 1–32)
BACTERIA UR QL AUTO: ABNORMAL /HPF
BILIRUB SERPL-MCNC: 0.6 MG/DL (ref 0–1.2)
BILIRUB UR QL STRIP: NEGATIVE
BUN SERPL-MCNC: 14.3 MG/DL (ref 8–23)
BUN/CREAT SERPL: 9.1 (ref 7–25)
CALCIUM SPEC-SCNC: 8.4 MG/DL (ref 8.6–10.5)
CHLORIDE SERPL-SCNC: 104 MMOL/L (ref 98–107)
CLARITY UR: CLEAR
CO2 SERPL-SCNC: 20 MMOL/L (ref 22–29)
COLOR UR: YELLOW
CREAT SERPL-MCNC: 1.57 MG/DL (ref 0.57–1)
DEPRECATED RDW RBC AUTO: 47.3 FL (ref 37–54)
EGFRCR SERPLBLD CKD-EPI 2021: 34.5 ML/MIN/1.73
ERYTHROCYTE [DISTWIDTH] IN BLOOD BY AUTOMATED COUNT: 13.5 % (ref 12.3–15.4)
GLOBULIN UR ELPH-MCNC: 4.1 GM/DL
GLUCOSE SERPL-MCNC: 133 MG/DL (ref 65–99)
GLUCOSE UR STRIP-MCNC: NEGATIVE MG/DL
HCT VFR BLD AUTO: 33.7 % (ref 34–46.6)
HGB BLD-MCNC: 11 G/DL (ref 12–15.9)
HGB UR QL STRIP.AUTO: ABNORMAL
HYALINE CASTS UR QL AUTO: ABNORMAL /LPF
KETONES UR QL STRIP: NEGATIVE
LEUKOCYTE ESTERASE UR QL STRIP.AUTO: ABNORMAL
MCH RBC QN AUTO: 31.4 PG (ref 26.6–33)
MCHC RBC AUTO-ENTMCNC: 32.6 G/DL (ref 31.5–35.7)
MCV RBC AUTO: 96.3 FL (ref 79–97)
NITRITE UR QL STRIP: NEGATIVE
PH UR STRIP.AUTO: 6.5 [PH] (ref 5–8)
PLATELET # BLD AUTO: 335 10*3/MM3 (ref 140–450)
PMV BLD AUTO: 10.9 FL (ref 6–12)
POTASSIUM SERPL-SCNC: 3.9 MMOL/L (ref 3.5–5.2)
PROT SERPL-MCNC: 7.4 G/DL (ref 6–8.5)
PROT UR QL STRIP: ABNORMAL
RBC # BLD AUTO: 3.5 10*6/MM3 (ref 3.77–5.28)
RBC # UR STRIP: ABNORMAL /HPF
REF LAB TEST METHOD: ABNORMAL
SODIUM SERPL-SCNC: 138 MMOL/L (ref 136–145)
SP GR UR STRIP: 1.01 (ref 1–1.03)
SQUAMOUS #/AREA URNS HPF: ABNORMAL /HPF
UROBILINOGEN UR QL STRIP: ABNORMAL
WBC # UR STRIP: ABNORMAL /HPF
WBC NRBC COR # BLD AUTO: 10.54 10*3/MM3 (ref 3.4–10.8)

## 2025-07-15 PROCEDURE — 99495 TRANSJ CARE MGMT MOD F2F 14D: CPT

## 2025-07-15 PROCEDURE — 3078F DIAST BP <80 MM HG: CPT

## 2025-07-15 PROCEDURE — 3075F SYST BP GE 130 - 139MM HG: CPT

## 2025-07-15 PROCEDURE — 81001 URINALYSIS AUTO W/SCOPE: CPT

## 2025-07-15 PROCEDURE — 80053 COMPREHEN METABOLIC PANEL: CPT

## 2025-07-15 PROCEDURE — 36415 COLL VENOUS BLD VENIPUNCTURE: CPT

## 2025-07-15 PROCEDURE — 1126F AMNT PAIN NOTED NONE PRSNT: CPT

## 2025-07-15 PROCEDURE — 87086 URINE CULTURE/COLONY COUNT: CPT

## 2025-07-15 PROCEDURE — 85027 COMPLETE CBC AUTOMATED: CPT

## 2025-07-15 PROCEDURE — 1111F DSCHRG MED/CURRENT MED MERGE: CPT

## 2025-07-15 NOTE — PROGRESS NOTES
Transitional Care Follow Up Visit  Subjective       Sarah Littlejohn is a 75 y.o. female who presents for a transitional care management visit.    Within 48 business hours after discharge our office contacted her via telephone to coordinate her care and needs.      I reviewed and discussed the details of that call along with the discharge summary, hospital problems, inpatient lab results, inpatient diagnostic studies, and consultation reports with Sarah.     Current outpatient and discharge medications have been reconciled for the patient.  Reviewed by: ANY Michael          7/8/2025    12:52 PM   Date of TCM Phone Call   [facility]   Date of Admission 7/3/2025   Date of Discharge 7/8/2025   Discharge Disposition Home or Self Care     Risk for Readmission (LACE) Score: 9 (7/8/2025  5:00 AM)      History of Present Illness   Course During Hospital Stay:     History of Present Illness  The patient is a 75-year-old female who presents for a hospital follow-up.    She was admitted to [facility] on 07/03/2025 and discharged home on 07/08/2025 due to acute renal failure superimposed on stage 3b chronic kidney disease, cystitis, diarrhea, metabolic acidosis, hypomagnesemia, and hypertension. She presented on 07/03/2025 after experiencing ongoing diarrhea with occasional symptoms of nausea and vomiting. She indicated that she was having symptoms for 4 to 5 days leading up to hospitalization. She felt she was becoming dehydrated and much weaker, with generalized nonfocal weakness. Her intake had drastically decreased given her ongoing symptoms including nausea and vomiting. She also noted a decline in her urine output. On arrival to the facility, laboratory studies were performed revealing creatinine of 3.87 and GFR of approximately 11. She was admitted to the hospitalist service for further workup and management, and nephrology was consulted. She was started on IV fluids.  She was also found to have a urinary tract infection, with a urine culture positive for E. coli. She was initially placed on IV Rocephin and transitioned to oral Keflex at discharge for management of the UTI. A renal ultrasound revealed possible cortical thinning bilaterally and mild increased renal parenchyma echogenicity on the right, suggesting chronic renal parenchymal disease. Renal cysts were noted. There was no evidence of hydronephrosis. She had steady improvement of her creatinine and GFR on a daily basis. She was started on oral bicarb, which was continued at discharge, and a vitamin D supplement. Creatinine trended from 3.8 down to 2.17, and GFR improved to 23.4. She had daily improvement with the numbers, and plans were in place to discharge home and follow up in 1 week with nephrology and primary care. At the time of discharge, her symptoms of nausea, vomiting, and diarrhea had resolved. She was tolerating diet without problems. The importance of good hydration was discussed. She was discharged home with instructions to continue holding lisinopril and hydrochlorothiazide, to be reassessed during her 1-week follow-up with the nephrology clinic.    Post-discharge, she has been experiencing intermittent vomiting, which has not improved since her hospital stay. Although she has regained some strength, allowing her to walk short distances, she continues to suffer from diarrhea that has persisted for 2 weeks. She reports frequent gagging noises and feels nauseous when using the bathroom. She does not experience any pain during urination. Her appetite is poor, and she can only manage to take a few steps before feeling exhausted. She reports no abdominal pain or shortness of breath. She is currently taking vitamin D and sodium bicarbonate but has completed her course of Keflex.    She also reports wrist pain, which she believes is due to an IV insertion during her hospital stay. The pain began yesterday and  "restricts her wrist movement and ability to make a fist.    She is not currently taking lisinopril-hydrochlorothiazide.     The following portions of the patient's history were reviewed and updated as appropriate: allergies, current medications, past family history, past medical history, past social history, past surgical history, and problem list.    Review of Systems   Constitutional:  Positive for fatigue.   Respiratory:  Negative for chest tightness and shortness of breath.    Cardiovascular:  Negative for chest pain and palpitations.   Gastrointestinal:  Positive for diarrhea, nausea and vomiting. Negative for abdominal pain.   Genitourinary:  Negative for dysuria.   Neurological:  Positive for weakness (generalized).       Objective   /64   Pulse 62   Ht 157.5 cm (62\")   SpO2 99%   BMI 47.19 kg/m²   Physical Exam  Constitutional:       Appearance: Normal appearance.      Comments: Pleasant, in wheelchair.   Cardiovascular:      Rate and Rhythm: Normal rate and regular rhythm.   Pulmonary:      Effort: Pulmonary effort is normal.      Breath sounds: Normal breath sounds. No wheezing, rhonchi or rales.   Abdominal:      General: Bowel sounds are normal.      Palpations: Abdomen is soft.      Tenderness: There is no abdominal tenderness.   Musculoskeletal:        Hands:    Skin:     General: Skin is warm and dry.   Neurological:      Mental Status: She is alert.   Psychiatric:         Mood and Affect: Mood normal.         Behavior: Behavior normal.         Thought Content: Thought content normal.         Judgment: Judgment normal.       Physical Exam  Respiratory: Clear to auscultation, no wheezing, rales or rhonchi  Musculoskeletal: Tenderness in the right wrist, limited range of motion, no signs of inflammation or infection    Assessment & Plan   Diagnoses and all orders for this visit:    1. Diarrhea, unspecified type (Primary)  -     Gastrointestinal Panel, PCR - Stool, Per Rectum; Future  -     " Clostridioides difficile Toxin, PCR - Stool, Per Rectum; Future    2. Nausea and vomiting, unspecified vomiting type    3. Stage 3b chronic kidney disease    4. Acute renal failure, unspecified acute renal failure type  -     Comprehensive Metabolic Panel; Future    5. Urinary tract infection without hematuria, site unspecified  -     CBC (No Diff); Future  -     Urinalysis With Culture If Indicated -; Future    6. Anemia due to other cause, not classified  -     Gastrointestinal Panel, PCR - Stool, Per Rectum; Future      Assessment & Plan  1. Chronic kidney disease.  - Chronic kidney disease with recent acute renal failure.  - Kidney function has improved but is not back to baseline.  - Creatinine trending from 3.8 down to 2.17; GFR improved to 23.4.  - Repeat metabolic panel ordered to monitor kidney function.    2. Acute renal failure.  - Hospitalized for acute renal failure with improvement noted.  - Symptoms of nausea, vomiting, and diarrhea had resolved at discharge, but continue now.  - Creatinine and GFR showed daily improvement during hospitalization.  - Repeat metabolic panel ordered to ensure continued recovery.    3. Urinary tract infection.  - UTI caused by E. coli treated with IV Rocephin initially followed by Keflex at discharge..  - Symptoms of UTI have resolved; no current antibiotics.  - Repeat urine culture and urinalysis ordered to ensure infection resolution.  - White blood cell count was elevated but trended down before discharge.    4. Diarrhea.  - Experiencing diarrhea for the past two weeks.  - Gastrointestinal panel ordered to check for Clostridium difficile and other infections.  - Treatment will be determined based upon gastrointestinal panel and C. difficile test.    5. Nausea and vomiting.  - Nausea and vomiting, particularly when using the bathroom, possibly related to recent antibiotic use or ongoing problems.  - Symptomatic treatment for nausea will be provided based on lab  results.  - Stool sample collection ordered to check for gastrointestinal infections.    6. Hypertension.  - Blood pressure slightly elevated but improved upon recheck to 132/64 mmHg.  - No changes to current medication regimen necessary at this time.  - Lisinopril hydrochlorothiazide currently held; reassessment pending lab results.    7. Wrist pain.  - Wrist pain likely due to IV infiltration during recent hospitalization.  - Area appears to have fluid.  - No signs of infection or inflammation noted.  - Apply a cool compress to decrease tenderness.             Patient or patient representative verbalized consent for the use of Ambient Listening during the visit with  ANY Michael for chart documentation. 7/15/2025  11:17 CDT

## 2025-07-16 LAB — BACTERIA SPEC AEROBE CULT: NORMAL

## 2025-07-17 PROCEDURE — 87449 NOS EACH ORGANISM AG IA: CPT

## 2025-07-17 PROCEDURE — 87507 IADNA-DNA/RNA PROBE TQ 12-25: CPT

## 2025-07-17 PROCEDURE — 87493 C DIFF AMPLIFIED PROBE: CPT

## 2025-07-18 ENCOUNTER — LAB (OUTPATIENT)
Dept: LAB | Facility: HOSPITAL | Age: 75
End: 2025-07-18
Payer: MEDICARE

## 2025-07-18 DIAGNOSIS — D64.89 ANEMIA DUE TO OTHER CAUSE, NOT CLASSIFIED: ICD-10-CM

## 2025-07-18 DIAGNOSIS — R19.7 DIARRHEA, UNSPECIFIED TYPE: ICD-10-CM

## 2025-07-18 LAB
ADV 40+41 DNA STL QL NAA+NON-PROBE: NOT DETECTED
ASTRO TYP 1-8 RNA STL QL NAA+NON-PROBE: NOT DETECTED
C CAYETANENSIS DNA STL QL NAA+NON-PROBE: NOT DETECTED
C COLI+JEJ+UPSA DNA STL QL NAA+NON-PROBE: NOT DETECTED
C DIFF GDH + TOXINS A+B STL QL IA.RAPID: POSITIVE
C DIFF TOX GENS STL QL NAA+PROBE: POSITIVE
CRYPTOSP DNA STL QL NAA+NON-PROBE: NOT DETECTED
E HISTOLYT DNA STL QL NAA+NON-PROBE: NOT DETECTED
EAEC PAA PLAS AGGR+AATA ST NAA+NON-PRB: NOT DETECTED
EC STX1+STX2 GENES STL QL NAA+NON-PROBE: NOT DETECTED
EPEC EAE GENE STL QL NAA+NON-PROBE: NOT DETECTED
ETEC LTA+ST1A+ST1B TOX ST NAA+NON-PROBE: NOT DETECTED
G LAMBLIA DNA STL QL NAA+NON-PROBE: NOT DETECTED
NOROVIRUS GI+II RNA STL QL NAA+NON-PROBE: NOT DETECTED
P SHIGELLOIDES DNA STL QL NAA+NON-PROBE: NOT DETECTED
RVA RNA STL QL NAA+NON-PROBE: NOT DETECTED
S ENT+BONG DNA STL QL NAA+NON-PROBE: NOT DETECTED
SAPO I+II+IV+V RNA STL QL NAA+NON-PROBE: NOT DETECTED
SHIGELLA SP+EIEC IPAH ST NAA+NON-PROBE: NOT DETECTED
V CHOL+PARA+VUL DNA STL QL NAA+NON-PROBE: NOT DETECTED
V CHOLERAE DNA STL QL NAA+NON-PROBE: NOT DETECTED
Y ENTEROCOL DNA STL QL NAA+NON-PROBE: NOT DETECTED

## 2025-07-18 PROCEDURE — 87493 C DIFF AMPLIFIED PROBE: CPT

## 2025-07-18 PROCEDURE — 87449 NOS EACH ORGANISM AG IA: CPT

## 2025-07-18 PROCEDURE — 87507 IADNA-DNA/RNA PROBE TQ 12-25: CPT

## 2025-07-21 ENCOUNTER — TELEPHONE (OUTPATIENT)
Dept: INTERNAL MEDICINE | Facility: CLINIC | Age: 75
End: 2025-07-21
Payer: MEDICARE

## 2025-07-21 DIAGNOSIS — A04.72 CLOSTRIDIUM DIFFICILE DIARRHEA: Primary | ICD-10-CM

## 2025-07-21 RX ORDER — VANCOMYCIN HYDROCHLORIDE 125 MG/1
125 CAPSULE ORAL 4 TIMES DAILY
Qty: 40 CAPSULE | Refills: 0 | Status: SHIPPED | OUTPATIENT
Start: 2025-07-21

## 2025-07-21 NOTE — TELEPHONE ENCOUNTER
Pharmacy Name: [facility]    Pharmacy representative name: GENIA     Pharmacy representative phone number: [phone]     What medication are you calling in regards to: DIFICID    What question does the pharmacy have: COST IS $1600 WITH INSURANCE. WOULD YOU WANT TO RECOMMEND VANCOMYCIN    Who is the provider that prescribed the medication: JANETH    Additional notes:

## 2025-07-23 ENCOUNTER — READMISSION MANAGEMENT (OUTPATIENT)
Dept: CALL CENTER | Facility: HOSPITAL | Age: 75
End: 2025-07-23
Payer: MEDICARE

## 2025-07-23 NOTE — OUTREACH NOTE
Medical Week 2 Survey      Flowsheet Row Responses   [facility] patient discharged from? [place]   Does the patient have one of the following disease processes/diagnoses(primary or secondary)? Other   Week 2 attempt successful? No   Unsuccessful attempts Attempt 1   Revoke Ferny Pfeiffer Registered Nurse

## 2025-07-24 DIAGNOSIS — N18.32 STAGE 3B CHRONIC KIDNEY DISEASE: ICD-10-CM

## 2025-07-24 DIAGNOSIS — I10 PRIMARY HYPERTENSION: ICD-10-CM

## 2025-07-24 NOTE — TELEPHONE ENCOUNTER
Rx Refill Note  Requested Prescriptions     Pending Prescriptions Disp Refills    lisinopril-hydrochlorothiazide (PRINZIDE,ZESTORETIC) 20-25 MG per tablet [Pharmacy Med Name: LISINOPRIL-HCTZ 20-25 MG TAB] 90 tablet 1     Sig: Take 1 tablet by mouth Daily.      Last office visit with prescribing clinician: 1/28/2025   Last telemedicine visit with prescribing clinician: Visit date not found   Next office visit with prescribing clinician: 8/1/2025                         Would you like a call back once the refill request has been completed: [] Yes [] No    If the office needs to give you a call back, can they leave a voicemail: [] Yes [] No    Luis A Cleary MA  07/24/25, 08:53 CDT

## 2025-07-25 RX ORDER — LISINOPRIL AND HYDROCHLOROTHIAZIDE 20; 25 MG/1; MG/1
1 TABLET ORAL DAILY
Qty: 90 TABLET | Refills: 1 | Status: SHIPPED | OUTPATIENT
Start: 2025-07-25

## 2025-07-27 DIAGNOSIS — E79.0 ASYMPTOMATIC HYPERURICEMIA: ICD-10-CM

## 2025-07-28 RX ORDER — ALLOPURINOL 100 MG/1
100 TABLET ORAL DAILY
Qty: 90 TABLET | Refills: 1 | Status: SHIPPED | OUTPATIENT
Start: 2025-07-28

## 2025-08-01 ENCOUNTER — OFFICE VISIT (OUTPATIENT)
Dept: INTERNAL MEDICINE | Facility: CLINIC | Age: 75
End: 2025-08-01
Payer: MEDICARE

## 2025-08-01 VITALS
SYSTOLIC BLOOD PRESSURE: 143 MMHG | DIASTOLIC BLOOD PRESSURE: 78 MMHG | BODY MASS INDEX: 46.65 KG/M2 | HEART RATE: 65 BPM | OXYGEN SATURATION: 98 % | RESPIRATION RATE: 16 BRPM | WEIGHT: 253.5 LBS | HEIGHT: 62 IN

## 2025-08-01 DIAGNOSIS — F33.42 RECURRENT MAJOR DEPRESSIVE DISORDER, IN FULL REMISSION: ICD-10-CM

## 2025-08-01 DIAGNOSIS — I10 PRIMARY HYPERTENSION: ICD-10-CM

## 2025-08-01 DIAGNOSIS — N17.9 ACUTE RENAL FAILURE SUPERIMPOSED ON STAGE 3B CHRONIC KIDNEY DISEASE, UNSPECIFIED ACUTE RENAL FAILURE TYPE: ICD-10-CM

## 2025-08-01 DIAGNOSIS — N18.32 ACUTE RENAL FAILURE SUPERIMPOSED ON STAGE 3B CHRONIC KIDNEY DISEASE, UNSPECIFIED ACUTE RENAL FAILURE TYPE: ICD-10-CM

## 2025-08-01 DIAGNOSIS — N18.32 STAGE 3B CHRONIC KIDNEY DISEASE: Primary | ICD-10-CM

## 2025-08-01 PROBLEM — E83.42 HYPOMAGNESEMIA: Status: RESOLVED | Noted: 2025-07-03 | Resolved: 2025-08-01

## 2025-08-01 PROBLEM — N30.90 CYSTITIS: Status: RESOLVED | Noted: 2025-07-04 | Resolved: 2025-08-01

## 2025-08-01 PROBLEM — R19.7 DIARRHEA: Status: RESOLVED | Noted: 2025-07-03 | Resolved: 2025-08-01

## 2025-08-01 PROBLEM — E87.20 METABOLIC ACIDOSIS: Status: RESOLVED | Noted: 2025-07-03 | Resolved: 2025-08-01

## 2025-08-01 RX ORDER — ESCITALOPRAM OXALATE 20 MG/1
20 TABLET ORAL DAILY
Qty: 90 TABLET | Refills: 3 | Status: SHIPPED | OUTPATIENT
Start: 2025-08-01

## 2025-08-01 RX ORDER — LISINOPRIL 20 MG/1
20 TABLET ORAL DAILY
Qty: 90 TABLET | Refills: 1 | Status: SHIPPED | OUTPATIENT
Start: 2025-08-01

## 2025-08-01 NOTE — PROGRESS NOTES
CC: C diff and JAYDA    History:  Sarah Littlejohn is a 75 y.o. female who presented for a follow up. She recently went to the ER for JAYDA on CKD. She also has SOA during tasks that were previously not difficult and feels it has taken her extra time to recover from hospitalization. She has C dif and is taking Vancomycin after recent antibiotic treatment for UTI. No change in urinary habits and Creatinine was slightly elevated above baseline to 1.9 on labs at [facility] on 7/30/25.      ROS:  Review of Systems   Respiratory:  Positive for shortness of breath.    Cardiovascular:  Negative for chest pain.   Gastrointestinal:  Negative for abdominal pain.        reports that she has never smoked. She has never been exposed to tobacco smoke. She has never used smokeless tobacco. She reports that she does not drink alcohol and does not use drugs.      Current Outpatient Medications:     allopurinol (ZYLOPRIM) 100 MG tablet, TAKE 1 TABLET BY MOUTH EVERY DAY, Disp: 90 tablet, Rfl: 1    Ascorbic Acid (VITAMIN C GUMMIE PO), Take 3 tablets by mouth Daily., Disp: , Rfl:     calcium carb-cholecalciferol (Calcium 600+D3) 600-10 MG-MCG tablet per tablet, Take 1 tablet by mouth Daily., Disp: , Rfl:     cetirizine (zyrTEC) 10 MG tablet, Take 1 tablet by mouth Daily. Obtain OTC, Disp: , Rfl:     cholecalciferol (VITAMIN D3) 25 MCG (1000 UT) tablet, Take 1 tablet by mouth Daily., Disp: 30 tablet, Rfl: 2    escitalopram (LEXAPRO) 20 MG tablet, Take 1 tablet by mouth Daily., Disp: 90 tablet, Rfl: 3    Magnesium 250 MG tablet, Take 2 tablets by mouth Daily., Disp: , Rfl:     sodium bicarbonate 650 MG tablet, Take 1 tablet by mouth 3 (Three) Times a Day., Disp: 90 tablet, Rfl: 1    vancomycin (VANCOCIN) 125 MG capsule, Take 1 capsule by mouth 4 (Four) Times a Day., Disp: 40 capsule, Rfl: 0    lisinopril-hydrochlorothiazide (PRINZIDE,ZESTORETIC) 20-25 MG per tablet, Take 1 tablet by mouth Daily. (Patient not taking: Reported on 8/1/2025),  "Disp: 90 tablet, Rfl: 1    OBJECTIVE:  /78 (BP Location: Left arm, Patient Position: Sitting, Cuff Size: Adult)   Pulse 65   Resp 16   Ht 157.5 cm (62\")   Wt 115 kg (253 lb 8 oz)   SpO2 98%   BMI 46.37 kg/m²    Physical Exam  Constitutional:       General: She is not in acute distress.  Cardiovascular:      Heart sounds: Normal heart sounds.   Pulmonary:      Effort: Pulmonary effort is normal. No respiratory distress.      Breath sounds: Normal breath sounds.   Neurological:      Mental Status: She is alert and oriented to person, place, and time.         Assessment/Plan     Diagnoses and all orders for this visit:  Diagnoses and all orders for this visit:    1. Stage 3b chronic kidney disease (Primary)  3. Acute renal failure superimposed on stage 3b chronic kidney disease, unspecified acute renal failure type  -     lisinopril (PRINIVIL,ZESTRIL) 20 MG tablet; Take 1 tablet by mouth Daily.  Dispense: 90 tablet; Refill: 1  -     Renal Function Panel; Future  Renal function back to baseline. Restart RAAS blockade as above and consider SGLT2-I. Check RFP in 2 weeks.     2. Recurrent major depressive disorder, in full remission  -     escitalopram (LEXAPRO) 20 MG tablet; Take 1 tablet by mouth Daily.  Dispense: 90 tablet; Refill: 3  Fair control on Lexapro, which is refilled.     4. Primary hypertension  -     lisinopril (PRINIVIL,ZESTRIL) 20 MG tablet; Take 1 tablet by mouth Daily.  Dispense: 90 tablet; Refill: 1  -     Renal Function Panel; Future  Fair control, BP goal for age is <140/90 per JNC 8 guidelines, and restart med.    Some portions of this note including history and physical were obtained by a medical student. However, the full history, ROS, physical exam and plan were discussed and reviewed with the student and patient. Physical exam is my own. All elements of this note are reviewed and represent solely my assessment and plan.    An After Visit Summary was printed and given to the patient at " discharge.  Return in about 4 months (around 12/1/2025) for Recheck.      Michael Singleton D.O. 8/1/2025   Electronically signed.

## (undated) DEVICE — AIRLIFE™ NASAL OXYGEN CANNULA CURVED, NONFLARED TIP, WITH 7' FEET (2.1 M) CRUSH RESISTANT TUBING, OVER-THE-EAR STYLE: Brand: AIRLIFE™